# Patient Record
Sex: MALE | Race: WHITE | NOT HISPANIC OR LATINO | Employment: OTHER | URBAN - METROPOLITAN AREA
[De-identification: names, ages, dates, MRNs, and addresses within clinical notes are randomized per-mention and may not be internally consistent; named-entity substitution may affect disease eponyms.]

---

## 2019-09-06 ENCOUNTER — HOSPITAL ENCOUNTER (EMERGENCY)
Facility: HOSPITAL | Age: 54
Discharge: HOME | End: 2019-09-06
Attending: EMERGENCY MEDICINE | Admitting: EMERGENCY MEDICINE

## 2019-09-06 VITALS
OXYGEN SATURATION: 95 % | HEIGHT: 66 IN | DIASTOLIC BLOOD PRESSURE: 64 MMHG | HEART RATE: 78 BPM | SYSTOLIC BLOOD PRESSURE: 136 MMHG | RESPIRATION RATE: 18 BRPM | TEMPERATURE: 97.5 F | WEIGHT: 239.7 LBS | BODY MASS INDEX: 38.52 KG/M2

## 2019-09-06 DIAGNOSIS — E16.0 HYPOGLYCEMIA DUE TO INSULIN: Primary | ICD-10-CM

## 2019-09-06 DIAGNOSIS — T38.3X5A HYPOGLYCEMIA DUE TO INSULIN: Primary | ICD-10-CM

## 2019-09-06 DIAGNOSIS — R56.9 SEIZURE (HCC): ICD-10-CM

## 2019-09-06 LAB
ALBUMIN SERPL-MCNC: 3.8 G/DL (ref 3.5–5.2)
ALBUMIN/GLOB SERPL: 1 G/DL
ALP SERPL-CCNC: 64 U/L (ref 39–117)
ALT SERPL W P-5'-P-CCNC: 12 U/L (ref 1–41)
ANION GAP SERPL CALCULATED.3IONS-SCNC: 9.9 MMOL/L (ref 5–15)
AST SERPL-CCNC: 24 U/L (ref 1–40)
BASOPHILS # BLD AUTO: 0.04 10*3/MM3 (ref 0–0.2)
BASOPHILS NFR BLD AUTO: 0.6 % (ref 0–1.5)
BILIRUB SERPL-MCNC: 0.5 MG/DL (ref 0.2–1.2)
BUN BLD-MCNC: 19 MG/DL (ref 6–20)
BUN/CREAT SERPL: 18.4 (ref 7–25)
CALCIUM SPEC-SCNC: 9.1 MG/DL (ref 8.6–10.5)
CHLORIDE SERPL-SCNC: 100 MMOL/L (ref 98–107)
CO2 SERPL-SCNC: 28.1 MMOL/L (ref 22–29)
CREAT BLD-MCNC: 1.03 MG/DL (ref 0.76–1.27)
DEPRECATED RDW RBC AUTO: 46.8 FL (ref 37–54)
EOSINOPHIL # BLD AUTO: 0.25 10*3/MM3 (ref 0–0.4)
EOSINOPHIL NFR BLD AUTO: 3.5 % (ref 0.3–6.2)
ERYTHROCYTE [DISTWIDTH] IN BLOOD BY AUTOMATED COUNT: 14.5 % (ref 12.3–15.4)
GFR SERPL CREATININE-BSD FRML MDRD: 76 ML/MIN/1.73
GLOBULIN UR ELPH-MCNC: 3.7 GM/DL
GLUCOSE BLD-MCNC: 125 MG/DL (ref 65–99)
GLUCOSE BLDC GLUCOMTR-MCNC: 174 MG/DL (ref 70–130)
GLUCOSE BLDC GLUCOMTR-MCNC: 287 MG/DL (ref 70–130)
GLUCOSE BLDC GLUCOMTR-MCNC: 330 MG/DL (ref 70–130)
GLUCOSE BLDC GLUCOMTR-MCNC: 42 MG/DL (ref 70–130)
HCT VFR BLD AUTO: 41.3 % (ref 37.5–51)
HGB BLD-MCNC: 13.1 G/DL (ref 13–17.7)
IMM GRANULOCYTES # BLD AUTO: 0.02 10*3/MM3 (ref 0–0.05)
IMM GRANULOCYTES NFR BLD AUTO: 0.3 % (ref 0–0.5)
LYMPHOCYTES # BLD AUTO: 1.74 10*3/MM3 (ref 0.7–3.1)
LYMPHOCYTES NFR BLD AUTO: 24.2 % (ref 19.6–45.3)
MCH RBC QN AUTO: 28.2 PG (ref 26.6–33)
MCHC RBC AUTO-ENTMCNC: 31.7 G/DL (ref 31.5–35.7)
MCV RBC AUTO: 89 FL (ref 79–97)
MONOCYTES # BLD AUTO: 0.64 10*3/MM3 (ref 0.1–0.9)
MONOCYTES NFR BLD AUTO: 8.9 % (ref 5–12)
NEUTROPHILS # BLD AUTO: 4.5 10*3/MM3 (ref 1.7–7)
NEUTROPHILS NFR BLD AUTO: 62.5 % (ref 42.7–76)
NRBC BLD AUTO-RTO: 0 /100 WBC (ref 0–0.2)
PLATELET # BLD AUTO: 231 10*3/MM3 (ref 140–450)
PMV BLD AUTO: 10.7 FL (ref 6–12)
POTASSIUM BLD-SCNC: 3.8 MMOL/L (ref 3.5–5.2)
PROT SERPL-MCNC: 7.5 G/DL (ref 6–8.5)
RBC # BLD AUTO: 4.64 10*6/MM3 (ref 4.14–5.8)
SODIUM BLD-SCNC: 138 MMOL/L (ref 136–145)
WBC NRBC COR # BLD: 7.19 10*3/MM3 (ref 3.4–10.8)

## 2019-09-06 PROCEDURE — 99284 EMERGENCY DEPT VISIT MOD MDM: CPT

## 2019-09-06 PROCEDURE — 85025 COMPLETE CBC W/AUTO DIFF WBC: CPT | Performed by: EMERGENCY MEDICINE

## 2019-09-06 PROCEDURE — 82962 GLUCOSE BLOOD TEST: CPT

## 2019-09-06 PROCEDURE — 96374 THER/PROPH/DIAG INJ IV PUSH: CPT

## 2019-09-06 PROCEDURE — 80053 COMPREHEN METABOLIC PANEL: CPT | Performed by: EMERGENCY MEDICINE

## 2019-09-06 RX ORDER — DEXTROSE MONOHYDRATE 25 G/50ML
25 INJECTION, SOLUTION INTRAVENOUS ONCE
Status: COMPLETED | OUTPATIENT
Start: 2019-09-06 | End: 2019-09-06

## 2019-09-06 RX ORDER — SODIUM CHLORIDE 0.9 % (FLUSH) 0.9 %
10 SYRINGE (ML) INJECTION AS NEEDED
Status: DISCONTINUED | OUTPATIENT
Start: 2019-09-06 | End: 2019-09-06 | Stop reason: HOSPADM

## 2019-09-06 RX ADMIN — DEXTROSE MONOHYDRATE 25 G: 70 INJECTION, SOLUTION INTRAVENOUS at 02:41

## 2019-09-06 NOTE — ED NOTES
Pt to ER per EMS with report of seizure and low blood sugar. Pt was given 100ml of D10. Pt has hx of DM and has seizures when his blood sugar gets low. Pt is alert and oriented at this time.     Emily Torres RN  09/06/19 7383

## 2019-09-06 NOTE — ED PROVIDER NOTES
" EMERGENCY DEPARTMENT ENCOUNTER    CHIEF COMPLAINT  Chief Complaint: seizures  History given by: patient and EMS  History limited by: none  Room Number: 12/12  PMD: Provider, No Known      HPI:  Pt is a 53 y.o. male who presents complaining of hypoglycemia and seizures. Per EMS, pt was reported seizing for about 30 minutes and pt has a Hx of DM. Pt denies nausea, HA, blurred vision, vomiting, CP, and SOA. Pt states that he has Hx of similar episodes. Pt states he has been \"pretty busy today\" and \"probably didn't eat as well as usual today\". EMS gave 100mL of D10 en route to hospital and \"immediately was alert and oriented after\".     Duration:  30 minutes  Onset: gradual  Timing: constant  Location: general  Radiation: none  Quality: seizures  Intensity/Severity: mild  Progression: resolved  Associated Symptoms: none  Aggravating Factors: none  Alleviating Factors: none  Previous Episodes: Pt states he has had similar episodes in the past.  Treatment before arrival: EMS gave 100mL of D10 en route to hospital.    PAST MEDICAL HISTORY  Active Ambulatory Problems     Diagnosis Date Noted   • No Active Ambulatory Problems     Resolved Ambulatory Problems     Diagnosis Date Noted   • No Resolved Ambulatory Problems     Past Medical History:   Diagnosis Date   • Diabetes mellitus (CMS/HCC)    • Seizures (CMS/HCC)        PAST SURGICAL HISTORY  No past surgical history on file.    FAMILY HISTORY  No family history on file.    SOCIAL HISTORY  Social History     Socioeconomic History   • Marital status: Single     Spouse name: Not on file   • Number of children: Not on file   • Years of education: Not on file   • Highest education level: Not on file   Tobacco Use   • Smoking status: Never Smoker   Substance and Sexual Activity   • Alcohol use: No     Frequency: Never   • Drug use: No       ALLERGIES  Amoxicillin and Penicillins    REVIEW OF SYSTEMS  Review of Systems   Constitutional: Negative for activity change, appetite " change and fever.   HENT: Negative for congestion and sore throat.    Eyes: Negative.    Respiratory: Negative for cough and shortness of breath.    Cardiovascular: Negative for chest pain and leg swelling.   Gastrointestinal: Negative for abdominal pain, diarrhea and vomiting.   Endocrine: Negative.    Genitourinary: Negative for decreased urine volume and dysuria.   Musculoskeletal: Negative for neck pain.   Skin: Negative for rash and wound.   Allergic/Immunologic: Negative.    Neurological: Positive for seizures. Negative for weakness, numbness and headaches.   Hematological: Negative.    Psychiatric/Behavioral: Negative.    All other systems reviewed and are negative.      PHYSICAL EXAM  ED Triage Vitals [09/06/19 0232]   Temp Heart Rate Resp BP SpO2   -- 65 18 144/66 96 %      Temp src Heart Rate Source Patient Position BP Location FiO2 (%)   -- -- -- -- --       Physical Exam   Constitutional: He is oriented to person, place, and time. No distress.   HENT:   Head: Normocephalic and atraumatic.   Eyes: EOM are normal. Pupils are equal, round, and reactive to light.   Neck: Normal range of motion. Neck supple.   Cardiovascular: Normal rate, regular rhythm and normal heart sounds.   Pulmonary/Chest: Effort normal and breath sounds normal. No respiratory distress.   Abdominal: Soft. There is no tenderness. There is no rebound and no guarding.   Musculoskeletal: Normal range of motion. He exhibits no edema.   Neurological: He is alert and oriented to person, place, and time. He has normal sensation and normal strength.   Skin: Skin is warm and dry.   Psychiatric: Mood and affect normal.   Nursing note and vitals reviewed.      LAB RESULTS  Lab Results (last 24 hours)     Procedure Component Value Units Date/Time    POC Glucose Once [[id number]]  (Abnormal) Collected:  09/06/19 0239    Specimen:  Blood Updated:  09/06/19 0240     Glucose 42 mg/dL     CBC & Differential [[id number]] Collected:  09/06/19 0256     Specimen:  Blood Updated:  09/06/19 0305    Narrative:       The following orders were created for panel order CBC & Differential.  Procedure                               Abnormality         Status                     ---------                               -----------         ------                     CBC Auto Differential[[id number]]        Normal              Final result                 Please view results for these tests on the individual orders.    Comprehensive Metabolic Panel [[id number]]  (Abnormal) Collected:  09/06/19 0256    Specimen:  Blood Updated:  09/06/19 0323     Glucose 125 mg/dL      BUN 19 mg/dL      Creatinine 1.03 mg/dL      Sodium 138 mmol/L      Potassium 3.8 mmol/L      Chloride 100 mmol/L      CO2 28.1 mmol/L      Calcium 9.1 mg/dL      Total Protein 7.5 g/dL      Albumin 3.80 g/dL      ALT (SGPT) 12 U/L      AST (SGOT) 24 U/L      Alkaline Phosphatase 64 U/L      Total Bilirubin 0.5 mg/dL      eGFR Non African Amer 76 mL/min/1.73      Globulin 3.7 gm/dL      A/G Ratio 1.0 g/dL      BUN/Creatinine Ratio 18.4     Anion Gap 9.9 mmol/L     Narrative:       GFR Normal >60  Chronic Kidney Disease <60  Kidney Failure <15    CBC Auto Differential [[id number]]  (Normal) Collected:  09/06/19 0256    Specimen:  Blood Updated:  09/06/19 0305     WBC 7.19 10*3/mm3      RBC 4.64 10*6/mm3      Hemoglobin 13.1 g/dL      Hematocrit 41.3 %      MCV 89.0 fL      MCH 28.2 pg      MCHC 31.7 g/dL      RDW 14.5 %      RDW-SD 46.8 fl      MPV 10.7 fL      Platelets 231 10*3/mm3      Neutrophil % 62.5 %      Lymphocyte % 24.2 %      Monocyte % 8.9 %      Eosinophil % 3.5 %      Basophil % 0.6 %      Immature Grans % 0.3 %      Neutrophils, Absolute 4.50 10*3/mm3      Lymphocytes, Absolute 1.74 10*3/mm3      Monocytes, Absolute 0.64 10*3/mm3      Eosinophils, Absolute 0.25 10*3/mm3      Basophils, Absolute 0.04 10*3/mm3      Immature Grans, Absolute 0.02 10*3/mm3      nRBC 0.0 /100 WBC     POC Glucose Once  [[id number]]  (Abnormal) Collected:  09/06/19 0402    Specimen:  Blood Updated:  09/06/19 0403     Glucose 174 mg/dL     POC Glucose Once [[id number]]  (Abnormal) Collected:  09/06/19 0503    Specimen:  Blood Updated:  09/06/19 0504     Glucose 287 mg/dL     POC Glucose Once [[id number]]  (Abnormal) Collected:  09/06/19 0553    Specimen:  Blood Updated:  09/06/19 0555     Glucose 330 mg/dL           I ordered the above labs and reviewed the results    RADIOLOGY  No orders to display        I ordered the above noted radiological studies. Interpreted by radiologist. Discussed with radiologist. Reviewed by me in PACS.       PROCEDURES  Procedures      PROGRESS AND CONSULTS   0501: re-checked patient, he has eaten a meal tray and has no complaints.  BS elevating without any further drops.  Will continue to monitor blood sugar    0600:  pts BS remains stable without any further hypoglycemia/seizure activity.  Pt stable for discharge and will resume normal insulin regimen this am.  All questions answered.  Stressed the importance of small, frequent meals with insulin.  Pt stable for discharge        MEDICAL DECISION MAKING  Results were reviewed/discussed with the patient and they were also made aware of online access. Pt also made aware that some labs, such as cultures, will not be resulted during ER visit and follow up with PMD is necessary.     MDM  Number of Diagnoses or Management Options     Amount and/or Complexity of Data Reviewed  Obtain history from someone other than the patient: yes (EMS)           DIAGNOSIS  Final diagnoses:   Hypoglycemia due to insulin   Seizure (CMS/[facility])       DISPOSITION  DISCHARGE    Patient discharged in stable condition.    Reviewed implications of results, diagnosis, meds, responsibility to follow up, warning signs and symptoms of possible worsening, potential complications and reasons to return to ER.    Patient/Family voiced understanding of above instructions.    Discussed plan for  discharge, as there is no emergent indication for admission. Patient referred to primary care provider for BP management due to today's BP. Pt/family is agreeable and understands need for follow up and repeat testing.  Pt is aware that discharge does not mean that nothing is wrong but it indicates no emergency is present that requires admission and they must continue care with follow-up as given below or physician of their choice.     FOLLOW-UP  [facility] REFERRAL SERVICE  [facility] [address] [phone]  Schedule an appointment as soon as possible for a visit            Medication List      No changes were made to your prescriptions during this visit.           Latest Documented Vital Signs:  As of 6:07 AM  BP- 136/64 HR- 70 Temp- 97.4 °F (36.3 °C) (Tympanic) O2 sat- 94%    --  Documentation assistance provided by aida Mendes for Dr. Croft.  Information recorded by the scribe was done at my direction and has been verified and validated by me.     Isabel Mendes  09/06/19 0257       Yunier Croft MD  09/06/19 0608

## 2019-09-06 NOTE — ED NOTES
"EMS reports they received a call for seizure. Upon arrival, blood glucose monitor said \"low\" which is < 20 per EMS. Pt was having generalized tonic clonic seizure for at least 20 minutes witnessed by EMS. d10 started in IO access and pt became alert and oriented soon after. Total of 150 mL of d10 given. ]    Upon arrival to ED, pt blood glucose 42. b omar GLASS notified. This RN administered amp of d50 through IO r/t not having peripheral access.    Pt alert and oriented x4. Denies any pain except for IO. Denies n/v.     Pt moved to [place] yesterday from [place].     Saskia Salgado, RN  09/06/19 0251    "

## 2019-10-07 ENCOUNTER — OFFICE VISIT (OUTPATIENT)
Dept: FAMILY MEDICINE CLINIC | Facility: CLINIC | Age: 54
End: 2019-10-07

## 2019-10-07 VITALS
TEMPERATURE: 97.8 F | OXYGEN SATURATION: 94 % | RESPIRATION RATE: 18 BRPM | HEIGHT: 67 IN | BODY MASS INDEX: 38.11 KG/M2 | SYSTOLIC BLOOD PRESSURE: 120 MMHG | DIASTOLIC BLOOD PRESSURE: 70 MMHG | HEART RATE: 66 BPM | WEIGHT: 242.8 LBS

## 2019-10-07 DIAGNOSIS — E03.9 HYPOTHYROIDISM, UNSPECIFIED TYPE: ICD-10-CM

## 2019-10-07 DIAGNOSIS — G62.9 NEUROPATHY: ICD-10-CM

## 2019-10-07 DIAGNOSIS — E11.9 TYPE 2 DIABETES MELLITUS WITHOUT COMPLICATION, UNSPECIFIED WHETHER LONG TERM INSULIN USE (HCC): Primary | ICD-10-CM

## 2019-10-07 DIAGNOSIS — H40.9 GLAUCOMA, UNSPECIFIED GLAUCOMA TYPE, UNSPECIFIED LATERALITY: ICD-10-CM

## 2019-10-07 DIAGNOSIS — Z00.00 WELLNESS EXAMINATION: ICD-10-CM

## 2019-10-07 PROBLEM — Z79.4 TYPE 2 DIABETES MELLITUS WITHOUT COMPLICATION, WITH LONG-TERM CURRENT USE OF INSULIN (HCC): Status: ACTIVE | Noted: 2019-10-07

## 2019-10-07 PROCEDURE — 99214 OFFICE O/P EST MOD 30 MIN: CPT | Performed by: FAMILY MEDICINE

## 2019-10-07 PROCEDURE — 90471 IMMUNIZATION ADMIN: CPT | Performed by: FAMILY MEDICINE

## 2019-10-07 PROCEDURE — 90686 IIV4 VACC NO PRSV 0.5 ML IM: CPT | Performed by: FAMILY MEDICINE

## 2019-10-07 RX ORDER — LEVOTHYROXINE SODIUM 0.15 MG/1
150 TABLET ORAL EVERY OTHER DAY
Qty: 90 TABLET | Refills: 3 | Status: SHIPPED | OUTPATIENT
Start: 2019-10-07 | End: 2019-10-09 | Stop reason: ALTCHOICE

## 2019-10-07 RX ORDER — LATANOPROST 50 UG/ML
1 SOLUTION/ DROPS OPHTHALMIC NIGHTLY
COMMUNITY

## 2019-10-07 RX ORDER — SIMVASTATIN 10 MG
10 TABLET ORAL NIGHTLY
COMMUNITY
End: 2019-10-09 | Stop reason: SDUPTHER

## 2019-10-07 RX ORDER — ENALAPRIL MALEATE 2.5 MG/1
2.5 TABLET ORAL DAILY
Qty: 90 TABLET | Refills: 3 | Status: SHIPPED | OUTPATIENT
Start: 2019-10-07 | End: 2020-07-03 | Stop reason: HOSPADM

## 2019-10-07 RX ORDER — LEVOTHYROXINE SODIUM 175 UG/1
175 TABLET ORAL EVERY OTHER DAY
Qty: 90 TABLET | Refills: 3 | Status: SHIPPED | OUTPATIENT
Start: 2019-10-07 | End: 2019-10-09 | Stop reason: SDUPTHER

## 2019-10-07 RX ORDER — LANCETS 28 GAUGE
1 EACH MISCELLANEOUS AS NEEDED
COMMUNITY
End: 2019-12-27 | Stop reason: SDUPTHER

## 2019-10-07 RX ORDER — ENALAPRIL MALEATE 2.5 MG/1
2.5 TABLET ORAL DAILY
COMMUNITY
End: 2019-10-07 | Stop reason: SDUPTHER

## 2019-10-07 RX ORDER — GABAPENTIN 600 MG/1
600 TABLET ORAL 3 TIMES DAILY
Qty: 90 TABLET | Refills: 0 | Status: SHIPPED | OUTPATIENT
Start: 2019-10-07 | End: 2019-11-25 | Stop reason: SDUPTHER

## 2019-10-07 RX ORDER — LEVOTHYROXINE SODIUM 0.15 MG/1
150 TABLET ORAL EVERY OTHER DAY
COMMUNITY
End: 2019-10-07 | Stop reason: SDUPTHER

## 2019-10-07 RX ORDER — LEVOTHYROXINE SODIUM 175 UG/1
175 TABLET ORAL EVERY OTHER DAY
COMMUNITY
End: 2019-10-07 | Stop reason: SDUPTHER

## 2019-10-07 RX ORDER — GABAPENTIN 600 MG/1
600 TABLET ORAL 3 TIMES DAILY
COMMUNITY
End: 2019-10-07 | Stop reason: SDUPTHER

## 2019-10-07 RX ORDER — BRIMONIDINE TARTRATE 0.15 %
1 DROPS OPHTHALMIC (EYE) 2 TIMES DAILY
COMMUNITY

## 2019-10-07 NOTE — PROGRESS NOTES
Subjective   Anthony Yang is a 54 y.o. male.     Chief Complaint   Patient presents with   • Diabetes     diabetes follow up, pt is not fasting   • Med Refill     needs diabetes medication refills       BS at home, + feet care, + exercise and diet      Diabetes   He presents for his initial diabetic visit. He has type 2 diabetes mellitus. His disease course has been stable. There are no hypoglycemic associated symptoms. There are no hypoglycemic complications. Symptoms are stable. There are no diabetic complications. Risk factors for coronary artery disease include male sex and obesity. Current diabetic treatment includes diet. He is compliant with treatment all of the time.          The following portions of the patient's history were reviewed and updated as appropriate: allergies, current medications, past family history, past medical history, past social history, past surgical history and problem list.    Past Medical History:   Diagnosis Date   • Cataract    • Diabetes mellitus (CMS/HCC)    • Glaucoma    • Neuropathy    • Seizures (CMS/HCC)        Past Surgical History:   Procedure Laterality Date   • EYE SURGERY     • JOINT REPLACEMENT         History reviewed. No pertinent family history.    Social History     Socioeconomic History   • Marital status: Single     Spouse name: Not on file   • Number of children: Not on file   • Years of education: Not on file   • Highest education level: Not on file   Tobacco Use   • Smoking status: Never Smoker   • Smokeless tobacco: Never Used   Substance and Sexual Activity   • Alcohol use: No     Frequency: Never   • Drug use: No       Review of Systems   Constitutional: Negative.    HENT: Negative.    Eyes: Negative.    Respiratory: Negative.    Cardiovascular: Negative.    Gastrointestinal: Negative.    Endocrine: Negative.    Genitourinary: Negative.    Musculoskeletal: Negative.    Skin: Negative.    Allergic/Immunologic: Negative.    Neurological:        Positive  neuropathy   Hematological: Negative.    Psychiatric/Behavioral: Negative.    All other systems reviewed and are negative.      Objective   Vitals:    10/07/19 1302   BP: 120/70   Pulse: 66   Resp: 18   Temp: 97.8 °F (36.6 °C)   SpO2: 94%     Body mass index is 38.56 kg/m².  Physical Exam   Constitutional: He appears well-developed and well-nourished.   HENT:   Head: Normocephalic and atraumatic.   Right Ear: External ear normal.   Left Ear: External ear normal.   Mouth/Throat: Oropharynx is clear and moist.   Eyes: Pupils are equal, round, and reactive to light.   Neck: Normal range of motion. No thyromegaly present.   Cardiovascular: Normal rate, regular rhythm and normal heart sounds. Exam reveals no gallop and no friction rub.   No murmur heard.  Pulmonary/Chest: Effort normal and breath sounds normal. No stridor. No respiratory distress. He has no wheezes. He has no rales.   Abdominal: Soft. Bowel sounds are normal. He exhibits no distension and no mass. There is no tenderness. There is no rebound and no guarding.   Musculoskeletal: Normal range of motion. He exhibits no tenderness.   Neurological: He is alert. He displays normal reflexes. No cranial nerve deficit.   Skin: Skin is warm.   Psychiatric: He has a normal mood and affect. His behavior is normal. Thought content normal.   Nursing note and vitals reviewed.        Assessment/Plan   Anthony was seen today for diabetes and med refill.    Diagnoses and all orders for this visit:    Type 2 diabetes mellitus without complication, unspecified whether long term insulin use (CMS/[facility])  -     CBC & Differential; Future  -     Comprehensive Metabolic Panel; Future  -     Hemoglobin A1c; Future  -     Lipid Panel; Future  -     MicroAlbumin, Urine, Random - Urine, Clean Catch; Future  -     POC Urinalysis Dipstick, Automated; Future  -     TSH; Future  -     Ambulatory Referral to Ophthalmology; Future  -     Fluarix/Fluzone/Afluria Quad/FluLaval Quad    Wellness  examination  -     PSA Screen; Future    Hypothyroidism, unspecified type  -     TSH; Future  -     levothyroxine (SYNTHROID, LEVOTHROID) 150 MCG tablet; Take 1 tablet by mouth Every Other Day.  -     levothyroxine (SYNTHROID, LEVOTHROID) 175 MCG tablet; Take 1 tablet by mouth Every Other Day. Alternate with 150 mcg, every other day  -     T3, Free; Future  -     T4; Future  -     T4, free; Future    Glaucoma, unspecified glaucoma type, unspecified laterality  -     Ambulatory Referral to Ophthalmology; Future    Neuropathy  -     Compliance Drug Analysis, Ur - Urine, Clean Catch; Future  -     Vitamin B12; Future    Low maternal serum vitamin B12  -     Vitamin B12; Future    Other orders  -     enalapril (VASOTEC) 2.5 MG tablet; Take 1 tablet by mouth Daily.  -     metFORMIN (GLUCOPHAGE) 500 MG tablet; Take 1 tablet by mouth 2 (Two) Times a Day With Meals.

## 2019-10-08 ENCOUNTER — RESULTS ENCOUNTER (OUTPATIENT)
Dept: FAMILY MEDICINE CLINIC | Facility: CLINIC | Age: 54
End: 2019-10-08

## 2019-10-08 DIAGNOSIS — O28.0 LOW MATERNAL SERUM VITAMIN B12: ICD-10-CM

## 2019-10-08 DIAGNOSIS — E11.9 TYPE 2 DIABETES MELLITUS WITHOUT COMPLICATION, UNSPECIFIED WHETHER LONG TERM INSULIN USE (HCC): ICD-10-CM

## 2019-10-08 DIAGNOSIS — Z00.00 WELLNESS EXAMINATION: ICD-10-CM

## 2019-10-08 DIAGNOSIS — E03.9 HYPOTHYROIDISM, UNSPECIFIED TYPE: ICD-10-CM

## 2019-10-08 DIAGNOSIS — G62.9 NEUROPATHY: ICD-10-CM

## 2019-10-09 DIAGNOSIS — E03.9 HYPOTHYROIDISM, UNSPECIFIED TYPE: ICD-10-CM

## 2019-10-09 LAB
ALBUMIN SERPL-MCNC: 3.8 G/DL (ref 3.5–5.2)
ALBUMIN/GLOB SERPL: 1.1 G/DL
ALP SERPL-CCNC: 62 U/L (ref 39–117)
ALT SERPL-CCNC: 14 U/L (ref 1–41)
AST SERPL-CCNC: 22 U/L (ref 1–40)
BASOPHILS # BLD AUTO: 0.06 10*3/MM3 (ref 0–0.2)
BASOPHILS NFR BLD AUTO: 0.6 % (ref 0–1.5)
BILIRUB SERPL-MCNC: 0.6 MG/DL (ref 0.2–1.2)
BUN SERPL-MCNC: 12 MG/DL (ref 6–20)
BUN/CREAT SERPL: 10.9 (ref 7–25)
CALCIUM SERPL-MCNC: 8.9 MG/DL (ref 8.6–10.5)
CHLORIDE SERPL-SCNC: 100 MMOL/L (ref 98–107)
CHOLEST SERPL-MCNC: 193 MG/DL (ref 0–200)
CO2 SERPL-SCNC: 28.6 MMOL/L (ref 22–29)
CREAT SERPL-MCNC: 1.1 MG/DL (ref 0.76–1.27)
EOSINOPHIL # BLD AUTO: 0.22 10*3/MM3 (ref 0–0.4)
EOSINOPHIL NFR BLD AUTO: 2.3 % (ref 0.3–6.2)
ERYTHROCYTE [DISTWIDTH] IN BLOOD BY AUTOMATED COUNT: 14.8 % (ref 12.3–15.4)
GLOBULIN SER CALC-MCNC: 3.4 GM/DL
GLUCOSE SERPL-MCNC: 111 MG/DL (ref 65–99)
HBA1C MFR BLD: 8.5 % (ref 4.8–5.6)
HCT VFR BLD AUTO: 44.1 % (ref 37.5–51)
HDLC SERPL-MCNC: 58 MG/DL (ref 40–60)
HGB BLD-MCNC: 13.8 G/DL (ref 13–17.7)
IMM GRANULOCYTES # BLD AUTO: 0.02 10*3/MM3 (ref 0–0.05)
IMM GRANULOCYTES NFR BLD AUTO: 0.2 % (ref 0–0.5)
LDLC SERPL CALC-MCNC: 122 MG/DL (ref 0–100)
LYMPHOCYTES # BLD AUTO: 1.33 10*3/MM3 (ref 0.7–3.1)
LYMPHOCYTES NFR BLD AUTO: 14.1 % (ref 19.6–45.3)
MCH RBC QN AUTO: 28.5 PG (ref 26.6–33)
MCHC RBC AUTO-ENTMCNC: 31.3 G/DL (ref 31.5–35.7)
MCV RBC AUTO: 91.1 FL (ref 79–97)
MICROALBUMIN UR-MCNC: 3.5 UG/ML
MONOCYTES # BLD AUTO: 0.57 10*3/MM3 (ref 0.1–0.9)
MONOCYTES NFR BLD AUTO: 6.1 % (ref 5–12)
NEUTROPHILS # BLD AUTO: 7.21 10*3/MM3 (ref 1.7–7)
NEUTROPHILS NFR BLD AUTO: 76.7 % (ref 42.7–76)
NRBC BLD AUTO-RTO: 0.1 /100 WBC (ref 0–0.2)
PLATELET # BLD AUTO: 251 10*3/MM3 (ref 140–450)
POTASSIUM SERPL-SCNC: 4 MMOL/L (ref 3.5–5.2)
PROT SERPL-MCNC: 7.2 G/DL (ref 6–8.5)
PSA SERPL-MCNC: 0.24 NG/ML (ref 0–4)
RBC # BLD AUTO: 4.84 10*6/MM3 (ref 4.14–5.8)
SODIUM SERPL-SCNC: 140 MMOL/L (ref 136–145)
T3FREE SERPL-MCNC: 0.8 PG/ML (ref 2–4.4)
T4 FREE SERPL-MCNC: 0.22 NG/DL (ref 0.93–1.7)
T4 SERPL-MCNC: 1.59 MCG/DL (ref 4.5–11.7)
TRIGL SERPL-MCNC: 67 MG/DL (ref 0–150)
TSH SERPL DL<=0.005 MIU/L-ACNC: ABNORMAL UIU/ML (ref 0.27–4.2)
VIT B12 SERPL-MCNC: 315 PG/ML (ref 211–946)
VLDLC SERPL CALC-MCNC: 13.4 MG/DL
WBC # BLD AUTO: 9.41 10*3/MM3 (ref 3.4–10.8)

## 2019-10-09 RX ORDER — LEVOTHYROXINE SODIUM 175 UG/1
TABLET ORAL
Qty: 90 TABLET | Refills: 3 | Status: SHIPPED | OUTPATIENT
Start: 2019-10-09 | End: 2020-01-09 | Stop reason: SDUPTHER

## 2019-10-09 RX ORDER — SIMVASTATIN 20 MG
20 TABLET ORAL NIGHTLY
Qty: 90 TABLET | Refills: 3 | Status: SHIPPED | OUTPATIENT
Start: 2019-10-09 | End: 2020-09-14 | Stop reason: SDUPTHER

## 2019-10-09 NOTE — PROGRESS NOTES
His sugar is elevated please let him know, I will increase his 70/30 insulin to 35 units twice daily, TSH is also elevated with low t 3 low T4 continue taking levothyroxine 175 mcg daily not alternating and stop the 150 mcg, cholesterol also elevated I increase the simvastatin to 20 mg recheck thyroid in 6 weeks

## 2019-11-12 ENCOUNTER — LAB (OUTPATIENT)
Dept: FAMILY MEDICINE CLINIC | Facility: CLINIC | Age: 54
End: 2019-11-12

## 2019-11-12 DIAGNOSIS — E11.9 TYPE 2 DIABETES MELLITUS WITHOUT COMPLICATION, UNSPECIFIED WHETHER LONG TERM INSULIN USE (HCC): ICD-10-CM

## 2019-11-13 LAB
BILIRUB BLD-MCNC: NEGATIVE MG/DL
CLARITY, POC: CLEAR
COLOR UR: YELLOW
GLUCOSE UR STRIP-MCNC: NEGATIVE MG/DL
KETONES UR QL: ABNORMAL
LEUKOCYTE EST, POC: NEGATIVE
NITRITE UR-MCNC: NEGATIVE MG/ML
PH UR: 6 [PH] (ref 5–8)
PROT UR STRIP-MCNC: NEGATIVE MG/DL
RBC # UR STRIP: NEGATIVE /UL
SP GR UR: 1.02 (ref 1–1.03)
UROBILINOGEN UR QL: NORMAL

## 2019-11-16 LAB
DRUGS UR: NORMAL
DRUGS UR: NORMAL

## 2019-11-25 DIAGNOSIS — G62.9 NEUROPATHY: ICD-10-CM

## 2019-11-25 RX ORDER — GABAPENTIN 600 MG/1
600 TABLET ORAL 3 TIMES DAILY
Qty: 90 TABLET | Refills: 0 | Status: SHIPPED | OUTPATIENT
Start: 2019-11-25 | End: 2019-12-27 | Stop reason: SDUPTHER

## 2019-12-26 ENCOUNTER — TELEPHONE (OUTPATIENT)
Dept: FAMILY MEDICINE CLINIC | Facility: CLINIC | Age: 54
End: 2019-12-26

## 2019-12-26 DIAGNOSIS — Z79.4 TYPE 2 DIABETES MELLITUS WITHOUT COMPLICATION, WITH LONG-TERM CURRENT USE OF INSULIN (HCC): Primary | ICD-10-CM

## 2019-12-26 DIAGNOSIS — E11.9 TYPE 2 DIABETES MELLITUS WITHOUT COMPLICATION, WITH LONG-TERM CURRENT USE OF INSULIN (HCC): Primary | ICD-10-CM

## 2019-12-26 DIAGNOSIS — G62.9 NEUROPATHY: ICD-10-CM

## 2019-12-26 NOTE — TELEPHONE ENCOUNTER
PT NEEDS REFILL ON HIS Lancets (FREESTYLE) lancets, glucose blood (FREESTYLE LITE) test strip, AND insulin lispro (HUMALOG) 100 UNIT/ML injection SENT TO PHARMACY

## 2019-12-27 DIAGNOSIS — G62.9 NEUROPATHY: ICD-10-CM

## 2019-12-27 RX ORDER — LANCETS 28 GAUGE
EACH MISCELLANEOUS
Qty: 100 EACH | Refills: 2 | Status: SHIPPED | OUTPATIENT
Start: 2019-12-27 | End: 2020-01-08 | Stop reason: SDUPTHER

## 2019-12-27 RX ORDER — GABAPENTIN 600 MG/1
TABLET ORAL
Qty: 90 TABLET | Refills: 0 | Status: SHIPPED | OUTPATIENT
Start: 2019-12-27 | End: 2020-02-07

## 2019-12-27 RX ORDER — GABAPENTIN 600 MG/1
600 TABLET ORAL 3 TIMES DAILY
Qty: 90 TABLET | Refills: 0 | Status: SHIPPED | OUTPATIENT
Start: 2019-12-27 | End: 2020-01-08

## 2020-01-08 ENCOUNTER — OFFICE VISIT (OUTPATIENT)
Dept: FAMILY MEDICINE CLINIC | Facility: CLINIC | Age: 55
End: 2020-01-08

## 2020-01-08 VITALS
SYSTOLIC BLOOD PRESSURE: 116 MMHG | TEMPERATURE: 98.2 F | BODY MASS INDEX: 37.35 KG/M2 | OXYGEN SATURATION: 98 % | DIASTOLIC BLOOD PRESSURE: 72 MMHG | HEIGHT: 67 IN | HEART RATE: 83 BPM | WEIGHT: 238 LBS

## 2020-01-08 DIAGNOSIS — Z79.4 TYPE 2 DIABETES MELLITUS WITHOUT COMPLICATION, WITH LONG-TERM CURRENT USE OF INSULIN (HCC): ICD-10-CM

## 2020-01-08 DIAGNOSIS — I10 HYPERTENSION, UNSPECIFIED TYPE: ICD-10-CM

## 2020-01-08 DIAGNOSIS — E11.9 TYPE 2 DIABETES MELLITUS WITHOUT COMPLICATION, WITH LONG-TERM CURRENT USE OF INSULIN (HCC): ICD-10-CM

## 2020-01-08 DIAGNOSIS — E11.9 TYPE 2 DIABETES MELLITUS WITHOUT COMPLICATION, WITH LONG-TERM CURRENT USE OF INSULIN (HCC): Primary | ICD-10-CM

## 2020-01-08 DIAGNOSIS — E03.9 HYPOTHYROIDISM, UNSPECIFIED TYPE: ICD-10-CM

## 2020-01-08 DIAGNOSIS — Z79.4 TYPE 2 DIABETES MELLITUS WITHOUT COMPLICATION, WITH LONG-TERM CURRENT USE OF INSULIN (HCC): Primary | ICD-10-CM

## 2020-01-08 DIAGNOSIS — E78.5 HYPERLIPIDEMIA, UNSPECIFIED HYPERLIPIDEMIA TYPE: ICD-10-CM

## 2020-01-08 PROCEDURE — 99214 OFFICE O/P EST MOD 30 MIN: CPT | Performed by: FAMILY MEDICINE

## 2020-01-08 RX ORDER — LANCETS 28 GAUGE
EACH MISCELLANEOUS
Qty: 100 EACH | Refills: 2 | Status: SHIPPED | OUTPATIENT
Start: 2020-01-08

## 2020-01-08 RX ORDER — CALCIUM CARB/VITAMIN D3/VIT K1 500-100-40
TABLET,CHEWABLE ORAL
Qty: 100 EACH | Refills: 2 | Status: SHIPPED | OUTPATIENT
Start: 2020-01-08 | End: 2020-01-10 | Stop reason: SDUPTHER

## 2020-01-08 NOTE — PROGRESS NOTES
Subjective   Anthony Yang is a 54 y.o. male.     Chief Complaint   Patient presents with   • Diabetes     3 month f/up        average got flu shot, no chest pain or shortness of breath, he does need some refill his test strips and needles, he is not fasting today, no complaints, positive eye care and foot care         The following portions of the patient's history were reviewed and updated as appropriate: allergies, current medications, past family history, past medical history, past social history, past surgical history and problem list.    Past Medical History:   Diagnosis Date   • Cataract    • Diabetes mellitus (CMS/HCC)    • Glaucoma    • Neuropathy    • Seizures (CMS/HCC)        Past Surgical History:   Procedure Laterality Date   • EYE SURGERY     • JOINT REPLACEMENT         History reviewed. No pertinent family history.    Social History     Socioeconomic History   • Marital status: Single     Spouse name: Not on file   • Number of children: Not on file   • Years of education: Not on file   • Highest education level: Not on file   Tobacco Use   • Smoking status: Never Smoker   • Smokeless tobacco: Never Used   Substance and Sexual Activity   • Alcohol use: No     Frequency: Never   • Drug use: No   • Sexual activity: Defer       Review of Systems   Constitutional: Negative.  Negative for unexpected weight gain.   HENT: Negative.    Eyes: Negative.  Negative for blurred vision.   Respiratory: Negative.    Cardiovascular: Negative.    Gastrointestinal: Negative.  Negative for nausea and vomiting.   Endocrine: Negative.  Negative for polydipsia and polyuria.   Genitourinary: Negative.  Negative for frequency.   Musculoskeletal: Negative.    Skin: Negative.    Neurological: Negative.  Negative for numbness.   Hematological: Negative.    Psychiatric/Behavioral: Negative.    All other systems reviewed and are negative.      Objective   Vitals:    01/08/20 1243   BP: 116/72   Pulse: 83   Temp: 98.2 °F  (36.8 °C)   SpO2: 98%     Body mass index is 37.8 kg/m².  Physical Exam   Constitutional: He is oriented to person, place, and time. He appears well-developed and well-nourished.   HENT:   Head: Normocephalic and atraumatic.   Right Ear: External ear normal.   Left Ear: External ear normal.   Nose: Nose normal.   Mouth/Throat: Oropharynx is clear and moist.   Eyes: Pupils are equal, round, and reactive to light. Conjunctivae and EOM are normal.   Neck: Normal range of motion. Neck supple. No tracheal deviation present. No thyromegaly present.   Cardiovascular: Normal rate, regular rhythm and normal heart sounds. Exam reveals no gallop and no friction rub.   No murmur heard.  Pulmonary/Chest: Effort normal and breath sounds normal. No respiratory distress. He exhibits no tenderness.   Abdominal: Soft. Bowel sounds are normal. He exhibits no distension. There is no tenderness.   Musculoskeletal: Normal range of motion. He exhibits no edema or tenderness.   Lymphadenopathy:     He has no cervical adenopathy.   Neurological: He is alert and oriented to person, place, and time. He displays normal reflexes. No cranial nerve deficit or sensory deficit. Coordination normal.   Skin: Skin is warm and dry.   Psychiatric: He has a normal mood and affect. His behavior is normal. Judgment and thought content normal.   Nursing note and vitals reviewed.        Assessment/Plan   Anthony was seen today for diabetes.    Diagnoses and all orders for this visit:    Type 2 diabetes mellitus without complication, with long-term current use of insulin (CMS/[facility])  -     Comprehensive Metabolic Panel  -     Hemoglobin A1c  -     Cancel: Lipid Panel  -     T3, Free  -     T4  -     T4, free  -     TSH  -     Lipid Panel; Future    Hypothyroidism, unspecified type  -     T3, Free  -     T4  -     T4, free  -     TSH    Hyperlipidemia, unspecified hyperlipidemia type  -     Comprehensive Metabolic Panel  -     Hemoglobin A1c  -     T3, Free  -      T4  -     T4, free  -     TSH  -     Lipid Panel; Future    Hypertension, unspecified type  -     Comprehensive Metabolic Panel  -     Hemoglobin A1c  -     T3, Free  -     T4  -     T4, free  -     TSH  -     Lipid Panel; Future

## 2020-01-09 DIAGNOSIS — E03.9 HYPOTHYROIDISM, UNSPECIFIED TYPE: ICD-10-CM

## 2020-01-09 LAB
ALBUMIN SERPL-MCNC: 3.9 G/DL (ref 3.5–5.2)
ALBUMIN/GLOB SERPL: 1.1 G/DL
ALP SERPL-CCNC: 68 U/L (ref 39–117)
ALT SERPL-CCNC: 9 U/L (ref 1–41)
AST SERPL-CCNC: 16 U/L (ref 1–40)
BILIRUB SERPL-MCNC: 0.9 MG/DL (ref 0.2–1.2)
BUN SERPL-MCNC: 8 MG/DL (ref 6–20)
BUN/CREAT SERPL: 9 (ref 7–25)
CALCIUM SERPL-MCNC: 9.3 MG/DL (ref 8.6–10.5)
CHLORIDE SERPL-SCNC: 97 MMOL/L (ref 98–107)
CO2 SERPL-SCNC: 24.6 MMOL/L (ref 22–29)
CREAT SERPL-MCNC: 0.89 MG/DL (ref 0.76–1.27)
GLOBULIN SER CALC-MCNC: 3.7 GM/DL
GLUCOSE SERPL-MCNC: 216 MG/DL (ref 65–99)
HBA1C MFR BLD: 7.9 % (ref 4.8–5.6)
POTASSIUM SERPL-SCNC: 5.1 MMOL/L (ref 3.5–5.2)
PROT SERPL-MCNC: 7.6 G/DL (ref 6–8.5)
SODIUM SERPL-SCNC: 137 MMOL/L (ref 136–145)
T3FREE SERPL-MCNC: 2.9 PG/ML (ref 2–4.4)
T4 FREE SERPL-MCNC: 2.18 NG/DL (ref 0.93–1.7)
T4 SERPL-MCNC: 11.2 MCG/DL (ref 4.5–11.7)
TSH SERPL DL<=0.005 MIU/L-ACNC: 0.14 UIU/ML (ref 0.27–4.2)

## 2020-01-09 RX ORDER — LEVOTHYROXINE SODIUM 0.15 MG/1
TABLET ORAL
Qty: 90 TABLET | Refills: 3 | Status: SHIPPED | OUTPATIENT
Start: 2020-01-09 | End: 2020-07-03 | Stop reason: HOSPADM

## 2020-01-09 NOTE — PROGRESS NOTES
I called the patient, we will going to switch the levothyroxine to 150 mcg, recheck in 6 weeks continue therapy for sugar is improving

## 2020-01-10 DIAGNOSIS — Z79.4 TYPE 2 DIABETES MELLITUS WITHOUT COMPLICATION, WITH LONG-TERM CURRENT USE OF INSULIN (HCC): ICD-10-CM

## 2020-01-10 DIAGNOSIS — E11.9 TYPE 2 DIABETES MELLITUS WITHOUT COMPLICATION, WITH LONG-TERM CURRENT USE OF INSULIN (HCC): ICD-10-CM

## 2020-01-10 RX ORDER — CALCIUM CARB/VITAMIN D3/VIT K1 500-100-40
TABLET,CHEWABLE ORAL
Qty: 100 EACH | Refills: 2 | Status: SHIPPED | OUTPATIENT
Start: 2020-01-10 | End: 2020-09-14 | Stop reason: SDUPTHER

## 2020-02-06 DIAGNOSIS — G62.9 NEUROPATHY: ICD-10-CM

## 2020-02-07 RX ORDER — GABAPENTIN 600 MG/1
TABLET ORAL
Qty: 90 TABLET | Refills: 0 | Status: SHIPPED | OUTPATIENT
Start: 2020-02-07 | End: 2020-04-16

## 2020-02-08 ENCOUNTER — RESULTS ENCOUNTER (OUTPATIENT)
Dept: FAMILY MEDICINE CLINIC | Facility: CLINIC | Age: 55
End: 2020-02-08

## 2020-02-08 DIAGNOSIS — E11.9 TYPE 2 DIABETES MELLITUS WITHOUT COMPLICATION, WITH LONG-TERM CURRENT USE OF INSULIN (HCC): ICD-10-CM

## 2020-02-08 DIAGNOSIS — Z79.4 TYPE 2 DIABETES MELLITUS WITHOUT COMPLICATION, WITH LONG-TERM CURRENT USE OF INSULIN (HCC): ICD-10-CM

## 2020-02-08 DIAGNOSIS — I10 HYPERTENSION, UNSPECIFIED TYPE: ICD-10-CM

## 2020-02-08 DIAGNOSIS — E78.5 HYPERLIPIDEMIA, UNSPECIFIED HYPERLIPIDEMIA TYPE: ICD-10-CM

## 2020-04-13 DIAGNOSIS — G62.9 NEUROPATHY: ICD-10-CM

## 2020-04-16 RX ORDER — GABAPENTIN 600 MG/1
TABLET ORAL
Qty: 90 TABLET | Refills: 0 | Status: SHIPPED | OUTPATIENT
Start: 2020-04-16 | End: 2020-05-12

## 2020-05-11 DIAGNOSIS — G62.9 NEUROPATHY: ICD-10-CM

## 2020-05-12 RX ORDER — GABAPENTIN 600 MG/1
TABLET ORAL
Qty: 90 TABLET | Refills: 0 | Status: SHIPPED | OUTPATIENT
Start: 2020-05-12 | End: 2020-07-03 | Stop reason: HOSPADM

## 2020-06-12 ENCOUNTER — APPOINTMENT (OUTPATIENT)
Dept: NEUROLOGY | Facility: HOSPITAL | Age: 55
End: 2020-06-12

## 2020-06-12 ENCOUNTER — HOSPITAL ENCOUNTER (INPATIENT)
Facility: HOSPITAL | Age: 55
LOS: 21 days | Discharge: NURSING, REHAB OR LONG-TERM CARE | End: 2020-07-03
Attending: EMERGENCY MEDICINE | Admitting: INTERNAL MEDICINE

## 2020-06-12 ENCOUNTER — APPOINTMENT (OUTPATIENT)
Dept: GENERAL RADIOLOGY | Facility: HOSPITAL | Age: 55
End: 2020-06-12

## 2020-06-12 ENCOUNTER — APPOINTMENT (OUTPATIENT)
Dept: CT IMAGING | Facility: HOSPITAL | Age: 55
End: 2020-06-12

## 2020-06-12 DIAGNOSIS — R79.89 ELEVATED PROCALCITONIN: ICD-10-CM

## 2020-06-12 DIAGNOSIS — G93.40 ENCEPHALOPATHY ACUTE: ICD-10-CM

## 2020-06-12 DIAGNOSIS — R56.9 SEIZURE (HCC): Primary | ICD-10-CM

## 2020-06-12 DIAGNOSIS — D72.829 LEUKOCYTOSIS, UNSPECIFIED TYPE: ICD-10-CM

## 2020-06-12 LAB
ALBUMIN SERPL-MCNC: 3.7 G/DL (ref 3.5–5.2)
ALBUMIN/GLOB SERPL: 1 G/DL
ALP SERPL-CCNC: 57 U/L (ref 39–117)
ALT SERPL W P-5'-P-CCNC: 17 U/L (ref 1–41)
AMPHET+METHAMPHET UR QL: NEGATIVE
ANION GAP SERPL CALCULATED.3IONS-SCNC: 14.4 MMOL/L (ref 5–15)
APPEARANCE CSF: CLEAR
APPEARANCE CSF: CLEAR
ARTERIAL PATENCY WRIST A: POSITIVE
ARTERIAL PATENCY WRIST A: POSITIVE
AST SERPL-CCNC: 26 U/L (ref 1–40)
ATMOSPHERIC PRESS: 754.3 MMHG
ATMOSPHERIC PRESS: 756.9 MMHG
BACTERIA UR QL AUTO: ABNORMAL /HPF
BARBITURATES UR QL SCN: NEGATIVE
BASE EXCESS BLDA CALC-SCNC: -4.7 MMOL/L (ref 0–2)
BASE EXCESS BLDA CALC-SCNC: -6.2 MMOL/L (ref 0–2)
BASOPHILS # BLD AUTO: 0.05 10*3/MM3 (ref 0–0.2)
BASOPHILS NFR BLD AUTO: 0.3 % (ref 0–1.5)
BDY SITE: ABNORMAL
BDY SITE: ABNORMAL
BENZODIAZ UR QL SCN: NEGATIVE
BILIRUB SERPL-MCNC: 1.4 MG/DL (ref 0.2–1.2)
BILIRUB UR QL STRIP: NEGATIVE
BUN BLD-MCNC: 18 MG/DL (ref 6–20)
BUN/CREAT SERPL: 21.4 (ref 7–25)
C GATTII+NEOFOR DNA CSF QL NAA+NON-PROBE: NOT DETECTED
CALCIUM SPEC-SCNC: 8.9 MG/DL (ref 8.6–10.5)
CANNABINOIDS SERPL QL: NEGATIVE
CHLORIDE SERPL-SCNC: 99 MMOL/L (ref 98–107)
CK SERPL-CCNC: 149 U/L (ref 20–200)
CLARITY UR: CLEAR
CMV DNA CSF QL NAA+PROBE: NOT DETECTED
CO2 SERPL-SCNC: 21.6 MMOL/L (ref 22–29)
COCAINE UR QL: NEGATIVE
COLOR CSF: COLORLESS
COLOR CSF: COLORLESS
COLOR UR: YELLOW
CREAT BLD-MCNC: 0.84 MG/DL (ref 0.76–1.27)
D-LACTATE SERPL-SCNC: 1.6 MMOL/L (ref 0.5–2)
DEPRECATED RDW RBC AUTO: 46.5 FL (ref 37–54)
E COLI K1 DNA CSF QL NAA+NON-PROBE: NOT DETECTED
EOSINOPHIL # BLD AUTO: 0.01 10*3/MM3 (ref 0–0.4)
EOSINOPHIL NFR BLD AUTO: 0.1 % (ref 0.3–6.2)
ERYTHROCYTE [DISTWIDTH] IN BLOOD BY AUTOMATED COUNT: 14.4 % (ref 12.3–15.4)
ETHANOL BLD-MCNC: <10 MG/DL (ref 0–10)
ETHANOL UR QL: <0.01 %
EV RNA CSF QL NAA+PROBE: NOT DETECTED
GFR SERPL CREATININE-BSD FRML MDRD: 96 ML/MIN/1.73
GIE STN SPEC: NORMAL
GLOBULIN UR ELPH-MCNC: 3.7 GM/DL
GLUCOSE BLD-MCNC: 335 MG/DL (ref 65–99)
GLUCOSE BLDC GLUCOMTR-MCNC: 356 MG/DL (ref 70–130)
GLUCOSE BLDC GLUCOMTR-MCNC: 372 MG/DL (ref 70–130)
GLUCOSE CSF-MCNC: 199 MG/DL (ref 40–70)
GLUCOSE UR STRIP-MCNC: ABNORMAL MG/DL
GP B STREP DNA SPEC QL NAA+PROBE: NOT DETECTED
HAEM INFLU SEROTYP DNA SPEC NAA+PROBE: NOT DETECTED
HCO3 BLDA-SCNC: 19 MMOL/L (ref 22–28)
HCO3 BLDA-SCNC: 20 MMOL/L (ref 22–28)
HCT VFR BLD AUTO: 40.1 % (ref 37.5–51)
HGB BLD-MCNC: 13.1 G/DL (ref 13–17.7)
HGB UR QL STRIP.AUTO: ABNORMAL
HHV6 DNA CSF QL NAA+PROBE: NOT DETECTED
HOROWITZ INDEX BLD+IHG-RTO: 100 %
HOROWITZ INDEX BLD+IHG-RTO: 21 %
HSV1 DNA CSF QL NAA+PROBE: NOT DETECTED
HSV2 DNA CSF QL NAA+PROBE: NOT DETECTED
HYALINE CASTS UR QL AUTO: ABNORMAL /LPF
IMM GRANULOCYTES # BLD AUTO: 0.1 10*3/MM3 (ref 0–0.05)
IMM GRANULOCYTES NFR BLD AUTO: 0.6 % (ref 0–0.5)
INR PPP: 1.2 (ref 0.9–1.1)
KETONES UR QL STRIP: ABNORMAL
L MONOCYTOG RRNA SPEC QL PROBE: NOT DETECTED
LEUKOCYTE ESTERASE UR QL STRIP.AUTO: NEGATIVE
LYMPHOCYTES # BLD AUTO: 1.52 10*3/MM3 (ref 0.7–3.1)
LYMPHOCYTES NFR BLD AUTO: 8.4 % (ref 19.6–45.3)
LYMPHOCYTES NFR CSF MANUAL: 13 %
LYMPHOCYTES NFR CSF MANUAL: 7 %
MAGNESIUM SERPL-MCNC: 1.9 MG/DL (ref 1.6–2.6)
MCH RBC QN AUTO: 29.2 PG (ref 26.6–33)
MCHC RBC AUTO-ENTMCNC: 32.7 G/DL (ref 31.5–35.7)
MCV RBC AUTO: 89.5 FL (ref 79–97)
METHADONE UR QL SCN: NEGATIVE
METHOD: ABNORMAL
METHOD: ABNORMAL
MODALITY: ABNORMAL
MODALITY: ABNORMAL
MONOCYTES # BLD AUTO: 1.38 10*3/MM3 (ref 0.1–0.9)
MONOCYTES NFR BLD AUTO: 7.7 % (ref 5–12)
MONOCYTES NFR CSF MANUAL: 4 %
MONOCYTES NFR CSF MANUAL: 5 %
N MEN DNA SPEC QL NAA+PROBE: NOT DETECTED
NEUTROPHILS # BLD AUTO: 14.95 10*3/MM3 (ref 1.7–7)
NEUTROPHILS NFR BLD AUTO: 82.9 % (ref 42.7–76)
NEUTROPHILS NFR CSF MICRO: 82 %
NEUTROPHILS NFR CSF MICRO: 89 %
NITRITE UR QL STRIP: NEGATIVE
NRBC BLD AUTO-RTO: 0 /100 WBC (ref 0–0.2)
NUC CELL # CSF MANUAL: 113 /MM3 (ref 0–5)
NUC CELL # CSF MANUAL: 79 /MM3 (ref 0–5)
O2 A-A PPRESDIFF RESPIRATORY: 0.4 MMHG
O2 A-A PPRESDIFF RESPIRATORY: 0.8 MMHG
OPIATES UR QL: NEGATIVE
OXYCODONE UR QL SCN: NEGATIVE
PARECHOVIRUS A RNA CSF QL NAA+NON-PROBE: NOT DETECTED
PCO2 BLDA: 34.8 MM HG (ref 35–45)
PCO2 BLDA: 35.8 MM HG (ref 35–45)
PEEP RESPIRATORY: 5 CM[H2O]
PH BLDA: 7.33 PH UNITS (ref 7.35–7.45)
PH BLDA: 7.37 PH UNITS (ref 7.35–7.45)
PH UR STRIP.AUTO: 6 [PH] (ref 5–8)
PLATELET # BLD AUTO: 269 10*3/MM3 (ref 140–450)
PMV BLD AUTO: 11.9 FL (ref 6–12)
PO2 BLDA: 297.4 MM HG (ref 80–100)
PO2 BLDA: 89.2 MM HG (ref 80–100)
POTASSIUM BLD-SCNC: 4.6 MMOL/L (ref 3.5–5.2)
PROCALCITONIN SERPL-MCNC: 0.51 NG/ML (ref 0.1–0.25)
PROT CSF-MCNC: 25 MG/DL (ref 15–45)
PROT SERPL-MCNC: 7.4 G/DL (ref 6–8.5)
PROT UR QL STRIP: NEGATIVE
PROTHROMBIN TIME: 14.9 SECONDS (ref 11.7–14.2)
RBC # BLD AUTO: 4.48 10*6/MM3 (ref 4.14–5.8)
RBC # CSF MANUAL: 2 /MM3 (ref 0–0)
RBC # CSF MANUAL: 6 /MM3 (ref 0–0)
RBC # UR: ABNORMAL /HPF
REF LAB TEST METHOD: ABNORMAL
S PNEUM DNA CSF QL NAA+NON-PROBE: NOT DETECTED
SAO2 % BLDCOA: 96.7 % (ref 92–99)
SAO2 % BLDCOA: 99.9 % (ref 92–99)
SET MECH RESP RATE: 18
SODIUM BLD-SCNC: 135 MMOL/L (ref 136–145)
SP GR UR STRIP: 1.02 (ref 1–1.03)
SQUAMOUS #/AREA URNS HPF: ABNORMAL /HPF
T4 FREE SERPL-MCNC: 1.59 NG/DL (ref 0.93–1.7)
TOTAL RATE: 18 BREATHS/MINUTE
TOTAL RATE: 24 BREATHS/MINUTE
TUBE # CSF: 1
TUBE # CSF: 4
UROBILINOGEN UR QL STRIP: ABNORMAL
VENTILATOR MODE: ABNORMAL
VZV DNA CSF QL NAA+PROBE: NOT DETECTED
WBC NRBC COR # BLD: 18.01 10*3/MM3 (ref 3.4–10.8)
WBC UR QL AUTO: ABNORMAL /HPF

## 2020-06-12 PROCEDURE — 95816 EEG AWAKE AND DROWSY: CPT

## 2020-06-12 PROCEDURE — 86777 TOXOPLASMA ANTIBODY: CPT | Performed by: INTERNAL MEDICINE

## 2020-06-12 PROCEDURE — 25010000002 LEVETIRACETAM IN NACL 0.82% 500 MG/100ML SOLUTION: Performed by: PSYCHIATRY & NEUROLOGY

## 2020-06-12 PROCEDURE — 87076 CULTURE ANAEROBE IDENT EACH: CPT | Performed by: EMERGENCY MEDICINE

## 2020-06-12 PROCEDURE — 86592 SYPHILIS TEST NON-TREP QUAL: CPT | Performed by: INTERNAL MEDICINE

## 2020-06-12 PROCEDURE — 80307 DRUG TEST PRSMV CHEM ANLYZR: CPT | Performed by: EMERGENCY MEDICINE

## 2020-06-12 PROCEDURE — 82803 BLOOD GASES ANY COMBINATION: CPT

## 2020-06-12 PROCEDURE — 36600 WITHDRAWAL OF ARTERIAL BLOOD: CPT

## 2020-06-12 PROCEDURE — 83735 ASSAY OF MAGNESIUM: CPT | Performed by: EMERGENCY MEDICINE

## 2020-06-12 PROCEDURE — 82945 GLUCOSE OTHER FLUID: CPT | Performed by: INTERNAL MEDICINE

## 2020-06-12 PROCEDURE — 86778 TOXOPLASMA ANTIBODY IGM: CPT | Performed by: INTERNAL MEDICINE

## 2020-06-12 PROCEDURE — 84145 PROCALCITONIN (PCT): CPT | Performed by: EMERGENCY MEDICINE

## 2020-06-12 PROCEDURE — 87206 SMEAR FLUORESCENT/ACID STAI: CPT | Performed by: INTERNAL MEDICINE

## 2020-06-12 PROCEDURE — 80053 COMPREHEN METABOLIC PANEL: CPT | Performed by: EMERGENCY MEDICINE

## 2020-06-12 PROCEDURE — 88184 FLOWCYTOMETRY/ TC 1 MARKER: CPT | Performed by: INTERNAL MEDICINE

## 2020-06-12 PROCEDURE — 87205 SMEAR GRAM STAIN: CPT | Performed by: INTERNAL MEDICINE

## 2020-06-12 PROCEDURE — 84439 ASSAY OF FREE THYROXINE: CPT | Performed by: INTERNAL MEDICINE

## 2020-06-12 PROCEDURE — 94799 UNLISTED PULMONARY SVC/PX: CPT

## 2020-06-12 PROCEDURE — 87116 MYCOBACTERIA CULTURE: CPT | Performed by: INTERNAL MEDICINE

## 2020-06-12 PROCEDURE — 25010000003 LEVETIRACETAM IN NACL 0.75% 1000 MG/100ML SOLUTION: Performed by: EMERGENCY MEDICINE

## 2020-06-12 PROCEDURE — 85610 PROTHROMBIN TIME: CPT | Performed by: EMERGENCY MEDICINE

## 2020-06-12 PROCEDURE — 86789 WEST NILE VIRUS ANTIBODY: CPT | Performed by: INTERNAL MEDICINE

## 2020-06-12 PROCEDURE — 0BH17EZ INSERTION OF ENDOTRACHEAL AIRWAY INTO TRACHEA, VIA NATURAL OR ARTIFICIAL OPENING: ICD-10-PCS | Performed by: INTERNAL MEDICINE

## 2020-06-12 PROCEDURE — 25010000002 ACYCLOVIR PER 5 MG: Performed by: EMERGENCY MEDICINE

## 2020-06-12 PROCEDURE — 83605 ASSAY OF LACTIC ACID: CPT | Performed by: EMERGENCY MEDICINE

## 2020-06-12 PROCEDURE — 25010000003 LIDOCAINE 1 % SOLUTION: Performed by: INTERNAL MEDICINE

## 2020-06-12 PROCEDURE — 94003 VENT MGMT INPAT SUBQ DAY: CPT

## 2020-06-12 PROCEDURE — 71045 X-RAY EXAM CHEST 1 VIEW: CPT

## 2020-06-12 PROCEDURE — 85025 COMPLETE CBC W/AUTO DIFF WBC: CPT | Performed by: EMERGENCY MEDICINE

## 2020-06-12 PROCEDURE — 87483 CNS DNA AMP PROBE TYPE 12-25: CPT | Performed by: INTERNAL MEDICINE

## 2020-06-12 PROCEDURE — 87075 CULTR BACTERIA EXCEPT BLOOD: CPT | Performed by: INTERNAL MEDICINE

## 2020-06-12 PROCEDURE — 25010000002 VANCOMYCIN 10 G RECONSTITUTED SOLUTION: Performed by: EMERGENCY MEDICINE

## 2020-06-12 PROCEDURE — 87040 BLOOD CULTURE FOR BACTERIA: CPT | Performed by: EMERGENCY MEDICINE

## 2020-06-12 PROCEDURE — 84157 ASSAY OF PROTEIN OTHER: CPT | Performed by: INTERNAL MEDICINE

## 2020-06-12 PROCEDURE — 95816 EEG AWAKE AND DROWSY: CPT | Performed by: PSYCHIATRY & NEUROLOGY

## 2020-06-12 PROCEDURE — 99253 IP/OBS CNSLTJ NEW/EST LOW 45: CPT | Performed by: PSYCHIATRY & NEUROLOGY

## 2020-06-12 PROCEDURE — 87102 FUNGUS ISOLATION CULTURE: CPT | Performed by: INTERNAL MEDICINE

## 2020-06-12 PROCEDURE — 88185 FLOWCYTOMETRY/TC ADD-ON: CPT

## 2020-06-12 PROCEDURE — 86617 LYME DISEASE ANTIBODY: CPT | Performed by: INTERNAL MEDICINE

## 2020-06-12 PROCEDURE — 93005 ELECTROCARDIOGRAM TRACING: CPT | Performed by: EMERGENCY MEDICINE

## 2020-06-12 PROCEDURE — 87070 CULTURE OTHR SPECIMN AEROBIC: CPT | Performed by: INTERNAL MEDICINE

## 2020-06-12 PROCEDURE — 93010 ELECTROCARDIOGRAM REPORT: CPT | Performed by: INTERNAL MEDICINE

## 2020-06-12 PROCEDURE — 70450 CT HEAD/BRAIN W/O DYE: CPT

## 2020-06-12 PROCEDURE — 63710000001 INSULIN LISPRO (HUMAN) PER 5 UNITS: Performed by: INTERNAL MEDICINE

## 2020-06-12 PROCEDURE — 99285 EMERGENCY DEPT VISIT HI MDM: CPT

## 2020-06-12 PROCEDURE — 25010000002 ACYCLOVIR PER 5 MG: Performed by: INTERNAL MEDICINE

## 2020-06-12 PROCEDURE — P9612 CATHETERIZE FOR URINE SPEC: HCPCS

## 2020-06-12 PROCEDURE — 25010000003 CEFTRIAXONE PER 250 MG: Performed by: EMERGENCY MEDICINE

## 2020-06-12 PROCEDURE — 5A1955Z RESPIRATORY VENTILATION, GREATER THAN 96 CONSECUTIVE HOURS: ICD-10-PCS | Performed by: INTERNAL MEDICINE

## 2020-06-12 PROCEDURE — 82550 ASSAY OF CK (CPK): CPT | Performed by: INTERNAL MEDICINE

## 2020-06-12 PROCEDURE — 25010000002 PROPOFOL 10 MG/ML EMULSION: Performed by: INTERNAL MEDICINE

## 2020-06-12 PROCEDURE — 94002 VENT MGMT INPAT INIT DAY: CPT

## 2020-06-12 PROCEDURE — 82962 GLUCOSE BLOOD TEST: CPT

## 2020-06-12 PROCEDURE — 87015 SPECIMEN INFECT AGNT CONCNTJ: CPT | Performed by: INTERNAL MEDICINE

## 2020-06-12 PROCEDURE — 81001 URINALYSIS AUTO W/SCOPE: CPT | Performed by: EMERGENCY MEDICINE

## 2020-06-12 PROCEDURE — 25010000002 LORAZEPAM PER 2 MG: Performed by: EMERGENCY MEDICINE

## 2020-06-12 PROCEDURE — 89051 BODY FLUID CELL COUNT: CPT | Performed by: INTERNAL MEDICINE

## 2020-06-12 PROCEDURE — 86788 WEST NILE VIRUS AB IGM: CPT | Performed by: INTERNAL MEDICINE

## 2020-06-12 RX ORDER — HALOPERIDOL 5 MG/ML
2 INJECTION INTRAMUSCULAR EVERY 4 HOURS PRN
Status: DISCONTINUED | OUTPATIENT
Start: 2020-06-12 | End: 2020-07-01

## 2020-06-12 RX ORDER — DEXTROSE MONOHYDRATE 25 G/50ML
25 INJECTION, SOLUTION INTRAVENOUS
Status: DISCONTINUED | OUTPATIENT
Start: 2020-06-12 | End: 2020-07-03 | Stop reason: HOSPADM

## 2020-06-12 RX ORDER — ONDANSETRON 2 MG/ML
4 INJECTION INTRAMUSCULAR; INTRAVENOUS EVERY 6 HOURS PRN
Status: DISCONTINUED | OUTPATIENT
Start: 2020-06-12 | End: 2020-07-03 | Stop reason: HOSPADM

## 2020-06-12 RX ORDER — LEVETIRACETAM 5 MG/ML
500 INJECTION INTRAVASCULAR EVERY 12 HOURS SCHEDULED
Status: DISCONTINUED | OUTPATIENT
Start: 2020-06-12 | End: 2020-06-13

## 2020-06-12 RX ORDER — CHLORHEXIDINE GLUCONATE 0.12 MG/ML
15 RINSE ORAL EVERY 12 HOURS SCHEDULED
Status: DISCONTINUED | OUTPATIENT
Start: 2020-06-12 | End: 2020-06-24

## 2020-06-12 RX ORDER — LEVETIRACETAM 10 MG/ML
1000 INJECTION INTRAVASCULAR ONCE
Status: COMPLETED | OUTPATIENT
Start: 2020-06-12 | End: 2020-06-12

## 2020-06-12 RX ORDER — NOREPINEPHRINE BIT/0.9 % NACL 8 MG/250ML
.02-.3 INFUSION BOTTLE (ML) INTRAVENOUS
Status: DISCONTINUED | OUTPATIENT
Start: 2020-06-13 | End: 2020-06-15

## 2020-06-12 RX ORDER — LORAZEPAM 2 MG/ML
2 INJECTION INTRAMUSCULAR ONCE
Status: COMPLETED | OUTPATIENT
Start: 2020-06-12 | End: 2020-06-12

## 2020-06-12 RX ORDER — LIDOCAINE HYDROCHLORIDE 10 MG/ML
10 INJECTION, SOLUTION INFILTRATION; PERINEURAL ONCE
Status: COMPLETED | OUTPATIENT
Start: 2020-06-12 | End: 2020-06-12

## 2020-06-12 RX ORDER — FENTANYL CITRATE 50 UG/ML
50 INJECTION, SOLUTION INTRAMUSCULAR; INTRAVENOUS
Status: DISCONTINUED | OUTPATIENT
Start: 2020-06-12 | End: 2020-06-17

## 2020-06-12 RX ORDER — LORAZEPAM 2 MG/ML
1 INJECTION INTRAMUSCULAR ONCE
Status: COMPLETED | OUTPATIENT
Start: 2020-06-12 | End: 2020-06-12

## 2020-06-12 RX ORDER — SODIUM CHLORIDE 9 MG/ML
125 INJECTION, SOLUTION INTRAVENOUS CONTINUOUS
Status: DISCONTINUED | OUTPATIENT
Start: 2020-06-12 | End: 2020-06-12

## 2020-06-12 RX ORDER — CEFTRIAXONE SODIUM 2 G/50ML
2 INJECTION, SOLUTION INTRAVENOUS EVERY 12 HOURS
Status: DISCONTINUED | OUTPATIENT
Start: 2020-06-13 | End: 2020-06-16

## 2020-06-12 RX ORDER — ROCURONIUM BROMIDE 10 MG/ML
50 INJECTION, SOLUTION INTRAVENOUS ONCE
Status: COMPLETED | OUTPATIENT
Start: 2020-06-12 | End: 2020-06-12

## 2020-06-12 RX ORDER — FAMOTIDINE 10 MG/ML
20 INJECTION, SOLUTION INTRAVENOUS EVERY 12 HOURS SCHEDULED
Status: DISCONTINUED | OUTPATIENT
Start: 2020-06-12 | End: 2020-06-15

## 2020-06-12 RX ORDER — CEFTRIAXONE SODIUM 2 G/50ML
2 INJECTION, SOLUTION INTRAVENOUS ONCE
Status: COMPLETED | OUTPATIENT
Start: 2020-06-12 | End: 2020-06-12

## 2020-06-12 RX ORDER — SODIUM CHLORIDE, SODIUM LACTATE, POTASSIUM CHLORIDE, CALCIUM CHLORIDE 600; 310; 30; 20 MG/100ML; MG/100ML; MG/100ML; MG/100ML
125 INJECTION, SOLUTION INTRAVENOUS CONTINUOUS
Status: DISCONTINUED | OUTPATIENT
Start: 2020-06-12 | End: 2020-06-15

## 2020-06-12 RX ORDER — SODIUM CHLORIDE 0.9 % (FLUSH) 0.9 %
10 SYRINGE (ML) INJECTION AS NEEDED
Status: DISCONTINUED | OUTPATIENT
Start: 2020-06-12 | End: 2020-07-03 | Stop reason: HOSPADM

## 2020-06-12 RX ORDER — ACETAMINOPHEN 650 MG/1
650 SUPPOSITORY RECTAL EVERY 4 HOURS PRN
Status: DISCONTINUED | OUTPATIENT
Start: 2020-06-12 | End: 2020-06-15

## 2020-06-12 RX ORDER — FENTANYL CITRATE 50 UG/ML
100 INJECTION, SOLUTION INTRAMUSCULAR; INTRAVENOUS
Status: DISCONTINUED | OUTPATIENT
Start: 2020-06-12 | End: 2020-06-19

## 2020-06-12 RX ORDER — SODIUM CHLORIDE 0.9 % (FLUSH) 0.9 %
10 SYRINGE (ML) INJECTION EVERY 12 HOURS SCHEDULED
Status: DISCONTINUED | OUTPATIENT
Start: 2020-06-12 | End: 2020-07-03 | Stop reason: HOSPADM

## 2020-06-12 RX ORDER — NICOTINE POLACRILEX 4 MG
15 LOZENGE BUCCAL
Status: DISCONTINUED | OUTPATIENT
Start: 2020-06-12 | End: 2020-07-03 | Stop reason: HOSPADM

## 2020-06-12 RX ORDER — DIPHENHYDRAMINE HYDROCHLORIDE 50 MG/ML
50 INJECTION INTRAMUSCULAR; INTRAVENOUS ONCE
Status: DISCONTINUED | OUTPATIENT
Start: 2020-06-12 | End: 2020-06-15

## 2020-06-12 RX ORDER — ACETAMINOPHEN 325 MG/1
650 TABLET ORAL EVERY 4 HOURS PRN
Status: DISCONTINUED | OUTPATIENT
Start: 2020-06-12 | End: 2020-06-15

## 2020-06-12 RX ORDER — HALOPERIDOL 5 MG/ML
5 INJECTION INTRAMUSCULAR ONCE
Status: DISCONTINUED | OUTPATIENT
Start: 2020-06-12 | End: 2020-06-15

## 2020-06-12 RX ADMIN — LEVETIRACETAM 1000 MG: 10 INJECTION INTRAVENOUS at 12:36

## 2020-06-12 RX ADMIN — VANCOMYCIN HYDROCHLORIDE 2000 MG: 10 INJECTION, POWDER, LYOPHILIZED, FOR SOLUTION INTRAVENOUS at 23:12

## 2020-06-12 RX ADMIN — SODIUM CHLORIDE 125 ML/HR: 9 INJECTION, SOLUTION INTRAVENOUS at 11:04

## 2020-06-12 RX ADMIN — SODIUM CHLORIDE, PRESERVATIVE FREE 10 ML: 5 INJECTION INTRAVENOUS at 21:04

## 2020-06-12 RX ADMIN — FAMOTIDINE 20 MG: 10 INJECTION, SOLUTION INTRAVENOUS at 20:53

## 2020-06-12 RX ADMIN — Medication 0.02 MCG/KG/MIN: at 23:12

## 2020-06-12 RX ADMIN — SODIUM CHLORIDE, POTASSIUM CHLORIDE, SODIUM LACTATE AND CALCIUM CHLORIDE 125 ML/HR: 600; 310; 30; 20 INJECTION, SOLUTION INTRAVENOUS at 23:12

## 2020-06-12 RX ADMIN — ACYCLOVIR SODIUM 810 MG: 1000 INJECTION, SOLUTION INTRAVENOUS at 21:13

## 2020-06-12 RX ADMIN — VANCOMYCIN HYDROCHLORIDE 2000 MG: 10 INJECTION, POWDER, LYOPHILIZED, FOR SOLUTION INTRAVENOUS at 14:49

## 2020-06-12 RX ADMIN — INSULIN LISPRO 12 UNITS: 100 INJECTION, SOLUTION INTRAVENOUS; SUBCUTANEOUS at 21:13

## 2020-06-12 RX ADMIN — SODIUM CHLORIDE, POTASSIUM CHLORIDE, SODIUM LACTATE AND CALCIUM CHLORIDE 100 ML/HR: 600; 310; 30; 20 INJECTION, SOLUTION INTRAVENOUS at 18:48

## 2020-06-12 RX ADMIN — LEVETIRACETAM 500 MG: 5 INJECTION INTRAVENOUS at 20:53

## 2020-06-12 RX ADMIN — LIDOCAINE HYDROCHLORIDE 5 ML: 10 INJECTION, SOLUTION INFILTRATION; PERINEURAL at 17:59

## 2020-06-12 RX ADMIN — CEFTRIAXONE SODIUM 2 G: 2 INJECTION, SOLUTION INTRAVENOUS at 12:37

## 2020-06-12 RX ADMIN — CHLORHEXIDINE GLUCONATE 15 ML: 1.2 RINSE ORAL at 21:04

## 2020-06-12 RX ADMIN — ACYCLOVIR SODIUM 810 MG: 1000 INJECTION, SOLUTION INTRAVENOUS at 13:19

## 2020-06-12 RX ADMIN — DEXMEDETOMIDINE HYDROCHLORIDE 0.2 MCG/KG/HR: 100 INJECTION, SOLUTION, CONCENTRATE INTRAVENOUS at 18:48

## 2020-06-12 RX ADMIN — LORAZEPAM 1 MG: 2 INJECTION INTRAMUSCULAR; INTRAVENOUS at 12:14

## 2020-06-12 RX ADMIN — ROCURONIUM BROMIDE 50 MG: 10 INJECTION, SOLUTION INTRAVENOUS at 17:15

## 2020-06-12 RX ADMIN — LORAZEPAM 2 MG: 2 INJECTION INTRAMUSCULAR; INTRAVENOUS at 10:59

## 2020-06-12 RX ADMIN — SODIUM CHLORIDE 500 ML: 9 INJECTION, SOLUTION INTRAVENOUS at 10:53

## 2020-06-12 RX ADMIN — PROPOFOL 30 MCG/KG/MIN: 10 INJECTION, EMULSION INTRAVENOUS at 15:37

## 2020-06-12 RX ADMIN — SODIUM CHLORIDE 1000 ML: 9 INJECTION, SOLUTION INTRAVENOUS at 22:02

## 2020-06-13 ENCOUNTER — APPOINTMENT (OUTPATIENT)
Dept: MRI IMAGING | Facility: HOSPITAL | Age: 55
End: 2020-06-13

## 2020-06-13 ENCOUNTER — APPOINTMENT (OUTPATIENT)
Dept: GENERAL RADIOLOGY | Facility: HOSPITAL | Age: 55
End: 2020-06-13

## 2020-06-13 LAB
AMMONIA BLD-SCNC: 41 UMOL/L (ref 16–60)
ANION GAP SERPL CALCULATED.3IONS-SCNC: 12.2 MMOL/L (ref 5–15)
BUN BLD-MCNC: 18 MG/DL (ref 6–20)
BUN/CREAT SERPL: 20.5 (ref 7–25)
CALCIUM SPEC-SCNC: 8.1 MG/DL (ref 8.6–10.5)
CHLORIDE SERPL-SCNC: 105 MMOL/L (ref 98–107)
CO2 SERPL-SCNC: 19.8 MMOL/L (ref 22–29)
CREAT BLD-MCNC: 0.88 MG/DL (ref 0.76–1.27)
DEPRECATED RDW RBC AUTO: 46.1 FL (ref 37–54)
ERYTHROCYTE [DISTWIDTH] IN BLOOD BY AUTOMATED COUNT: 14.3 % (ref 12.3–15.4)
GFR SERPL CREATININE-BSD FRML MDRD: 91 ML/MIN/1.73
GLUCOSE BLD-MCNC: 157 MG/DL (ref 65–99)
GLUCOSE BLDC GLUCOMTR-MCNC: 133 MG/DL (ref 70–130)
GLUCOSE BLDC GLUCOMTR-MCNC: 174 MG/DL (ref 70–130)
GLUCOSE BLDC GLUCOMTR-MCNC: 185 MG/DL (ref 70–130)
GLUCOSE BLDC GLUCOMTR-MCNC: 190 MG/DL (ref 70–130)
GLUCOSE BLDC GLUCOMTR-MCNC: 204 MG/DL (ref 70–130)
GLUCOSE BLDC GLUCOMTR-MCNC: 225 MG/DL (ref 70–130)
GLUCOSE BLDC GLUCOMTR-MCNC: 226 MG/DL (ref 70–130)
GLUCOSE BLDC GLUCOMTR-MCNC: 257 MG/DL (ref 70–130)
HCT VFR BLD AUTO: 34.3 % (ref 37.5–51)
HGB BLD-MCNC: 11.5 G/DL (ref 13–17.7)
HIV1+2 AB SER QL: NORMAL
MCH RBC QN AUTO: 29.3 PG (ref 26.6–33)
MCHC RBC AUTO-ENTMCNC: 33.5 G/DL (ref 31.5–35.7)
MCV RBC AUTO: 87.3 FL (ref 79–97)
PLATELET # BLD AUTO: 263 10*3/MM3 (ref 140–450)
PMV BLD AUTO: 12.1 FL (ref 6–12)
POTASSIUM BLD-SCNC: 4.2 MMOL/L (ref 3.5–5.2)
PROCALCITONIN SERPL-MCNC: 0.47 NG/ML (ref 0.1–0.25)
RBC # BLD AUTO: 3.93 10*6/MM3 (ref 4.14–5.8)
SARS-COV-2 N GENE NPH QL NAA+PROBE: NOT DETECTED
SODIUM BLD-SCNC: 137 MMOL/L (ref 136–145)
TSH SERPL DL<=0.05 MIU/L-ACNC: 4.28 UIU/ML (ref 0.27–4.2)
WBC NRBC COR # BLD: 16.7 10*3/MM3 (ref 3.4–10.8)

## 2020-06-13 PROCEDURE — 25010000002 LEVETRIRACETAM PER 10 MG: Performed by: PSYCHIATRY & NEUROLOGY

## 2020-06-13 PROCEDURE — 84145 PROCALCITONIN (PCT): CPT | Performed by: INTERNAL MEDICINE

## 2020-06-13 PROCEDURE — 70553 MRI BRAIN STEM W/O & W/DYE: CPT

## 2020-06-13 PROCEDURE — 82140 ASSAY OF AMMONIA: CPT | Performed by: INTERNAL MEDICINE

## 2020-06-13 PROCEDURE — 84443 ASSAY THYROID STIM HORMONE: CPT | Performed by: INTERNAL MEDICINE

## 2020-06-13 PROCEDURE — 25010000003 CEFTRIAXONE PER 250 MG: Performed by: INTERNAL MEDICINE

## 2020-06-13 PROCEDURE — 94799 UNLISTED PULMONARY SVC/PX: CPT

## 2020-06-13 PROCEDURE — 94003 VENT MGMT INPAT SUBQ DAY: CPT

## 2020-06-13 PROCEDURE — 63710000001 INSULIN LISPRO (HUMAN) PER 5 UNITS: Performed by: INTERNAL MEDICINE

## 2020-06-13 PROCEDURE — 25010000002 HALOPERIDOL LACTATE PER 5 MG: Performed by: INTERNAL MEDICINE

## 2020-06-13 PROCEDURE — G0432 EIA HIV-1/HIV-2 SCREEN: HCPCS | Performed by: INTERNAL MEDICINE

## 2020-06-13 PROCEDURE — 80048 BASIC METABOLIC PNL TOTAL CA: CPT | Performed by: INTERNAL MEDICINE

## 2020-06-13 PROCEDURE — 25010000003 LEVETIRACETAM IN NACL 0.75% 1000 MG/100ML SOLUTION: Performed by: PSYCHIATRY & NEUROLOGY

## 2020-06-13 PROCEDURE — 82962 GLUCOSE BLOOD TEST: CPT

## 2020-06-13 PROCEDURE — 25010000002 FENTANYL CITRATE (PF) 100 MCG/2ML SOLUTION: Performed by: INTERNAL MEDICINE

## 2020-06-13 PROCEDURE — 25010000002 ACYCLOVIR PER 5 MG: Performed by: INTERNAL MEDICINE

## 2020-06-13 PROCEDURE — 25010000002 LEVETIRACETAM IN NACL 0.82% 500 MG/100ML SOLUTION: Performed by: PSYCHIATRY & NEUROLOGY

## 2020-06-13 PROCEDURE — 74018 RADEX ABDOMEN 1 VIEW: CPT

## 2020-06-13 PROCEDURE — 25010000002 VANCOMYCIN 10 G RECONSTITUTED SOLUTION: Performed by: INTERNAL MEDICINE

## 2020-06-13 PROCEDURE — 85027 COMPLETE CBC AUTOMATED: CPT | Performed by: INTERNAL MEDICINE

## 2020-06-13 PROCEDURE — 0 GADOBENATE DIMEGLUMINE 529 MG/ML SOLUTION: Performed by: INTERNAL MEDICINE

## 2020-06-13 PROCEDURE — 99223 1ST HOSP IP/OBS HIGH 75: CPT | Performed by: INTERNAL MEDICINE

## 2020-06-13 PROCEDURE — A9577 INJ MULTIHANCE: HCPCS | Performed by: INTERNAL MEDICINE

## 2020-06-13 PROCEDURE — 87635 SARS-COV-2 COVID-19 AMP PRB: CPT | Performed by: INTERNAL MEDICINE

## 2020-06-13 PROCEDURE — 99232 SBSQ HOSP IP/OBS MODERATE 35: CPT | Performed by: PSYCHIATRY & NEUROLOGY

## 2020-06-13 RX ORDER — NYSTATIN 100000 [USP'U]/G
POWDER TOPICAL EVERY 12 HOURS SCHEDULED
Status: DISCONTINUED | OUTPATIENT
Start: 2020-06-13 | End: 2020-07-03 | Stop reason: HOSPADM

## 2020-06-13 RX ORDER — LEVETIRACETAM 10 MG/ML
1000 INJECTION INTRAVASCULAR EVERY 12 HOURS SCHEDULED
Status: DISCONTINUED | OUTPATIENT
Start: 2020-06-13 | End: 2020-06-15

## 2020-06-13 RX ADMIN — FENTANYL CITRATE 100 MCG: 50 INJECTION INTRAMUSCULAR; INTRAVENOUS at 11:59

## 2020-06-13 RX ADMIN — ACYCLOVIR SODIUM 810 MG: 1000 INJECTION, SOLUTION INTRAVENOUS at 19:00

## 2020-06-13 RX ADMIN — FENTANYL CITRATE 100 MCG: 50 INJECTION INTRAMUSCULAR; INTRAVENOUS at 17:04

## 2020-06-13 RX ADMIN — ACYCLOVIR SODIUM 810 MG: 1000 INJECTION, SOLUTION INTRAVENOUS at 04:01

## 2020-06-13 RX ADMIN — CEFTRIAXONE SODIUM 2 G: 2 INJECTION, SOLUTION INTRAVENOUS at 19:00

## 2020-06-13 RX ADMIN — DEXMEDETOMIDINE HYDROCHLORIDE 0.2 MCG/KG/HR: 100 INJECTION, SOLUTION, CONCENTRATE INTRAVENOUS at 05:12

## 2020-06-13 RX ADMIN — DEXMEDETOMIDINE HYDROCHLORIDE 0.2 MCG/KG/HR: 100 INJECTION, SOLUTION, CONCENTRATE INTRAVENOUS at 23:27

## 2020-06-13 RX ADMIN — GADOBENATE DIMEGLUMINE 20 ML: 529 INJECTION, SOLUTION INTRAVENOUS at 19:15

## 2020-06-13 RX ADMIN — FENTANYL CITRATE 100 MCG: 50 INJECTION INTRAMUSCULAR; INTRAVENOUS at 23:07

## 2020-06-13 RX ADMIN — FENTANYL CITRATE 100 MCG: 50 INJECTION INTRAMUSCULAR; INTRAVENOUS at 20:42

## 2020-06-13 RX ADMIN — FENTANYL CITRATE 50 MCG: 50 INJECTION, SOLUTION INTRAMUSCULAR; INTRAVENOUS at 18:35

## 2020-06-13 RX ADMIN — INSULIN LISPRO 5 UNITS: 100 INJECTION, SOLUTION INTRAVENOUS; SUBCUTANEOUS at 20:29

## 2020-06-13 RX ADMIN — CHLORHEXIDINE GLUCONATE 15 ML: 1.2 RINSE ORAL at 08:02

## 2020-06-13 RX ADMIN — LEVETIRACETAM 3000 MG: 500 INJECTION, SOLUTION INTRAVENOUS at 09:58

## 2020-06-13 RX ADMIN — FAMOTIDINE 20 MG: 10 INJECTION, SOLUTION INTRAVENOUS at 20:29

## 2020-06-13 RX ADMIN — FENTANYL CITRATE 50 MCG: 50 INJECTION, SOLUTION INTRAMUSCULAR; INTRAVENOUS at 11:30

## 2020-06-13 RX ADMIN — SODIUM CHLORIDE, PRESERVATIVE FREE 10 ML: 5 INJECTION INTRAVENOUS at 21:02

## 2020-06-13 RX ADMIN — DEXMEDETOMIDINE HYDROCHLORIDE 0.3 MCG/KG/HR: 100 INJECTION, SOLUTION, CONCENTRATE INTRAVENOUS at 05:25

## 2020-06-13 RX ADMIN — NYSTATIN: 100000 POWDER TOPICAL at 20:29

## 2020-06-13 RX ADMIN — INSULIN LISPRO 5 UNITS: 100 INJECTION, SOLUTION INTRAVENOUS; SUBCUTANEOUS at 23:56

## 2020-06-13 RX ADMIN — INSULIN LISPRO 3 UNITS: 100 INJECTION, SOLUTION INTRAVENOUS; SUBCUTANEOUS at 11:36

## 2020-06-13 RX ADMIN — INSULIN LISPRO 3 UNITS: 100 INJECTION, SOLUTION INTRAVENOUS; SUBCUTANEOUS at 00:08

## 2020-06-13 RX ADMIN — FAMOTIDINE 20 MG: 10 INJECTION, SOLUTION INTRAVENOUS at 08:01

## 2020-06-13 RX ADMIN — FENTANYL CITRATE 50 MCG: 50 INJECTION, SOLUTION INTRAMUSCULAR; INTRAVENOUS at 14:50

## 2020-06-13 RX ADMIN — VANCOMYCIN HYDROCHLORIDE 2000 MG: 10 INJECTION, POWDER, LYOPHILIZED, FOR SOLUTION INTRAVENOUS at 11:36

## 2020-06-13 RX ADMIN — CHLORHEXIDINE GLUCONATE 15 ML: 1.2 RINSE ORAL at 21:02

## 2020-06-13 RX ADMIN — LEVETIRACETAM 1000 MG: 10 INJECTION INTRAVENOUS at 20:29

## 2020-06-13 RX ADMIN — INSULIN LISPRO 5 UNITS: 100 INJECTION, SOLUTION INTRAVENOUS; SUBCUTANEOUS at 08:00

## 2020-06-13 RX ADMIN — VANCOMYCIN HYDROCHLORIDE 2000 MG: 10 INJECTION, POWDER, LYOPHILIZED, FOR SOLUTION INTRAVENOUS at 23:56

## 2020-06-13 RX ADMIN — LEVETIRACETAM 500 MG: 5 INJECTION INTRAVENOUS at 08:01

## 2020-06-13 RX ADMIN — SODIUM CHLORIDE, PRESERVATIVE FREE 10 ML: 5 INJECTION INTRAVENOUS at 08:11

## 2020-06-13 RX ADMIN — HALOPERIDOL LACTATE 2 MG: 5 INJECTION, SOLUTION INTRAMUSCULAR at 21:00

## 2020-06-13 RX ADMIN — SODIUM CHLORIDE, POTASSIUM CHLORIDE, SODIUM LACTATE AND CALCIUM CHLORIDE 125 ML/HR: 600; 310; 30; 20 INJECTION, SOLUTION INTRAVENOUS at 20:29

## 2020-06-13 RX ADMIN — SODIUM CHLORIDE, POTASSIUM CHLORIDE, SODIUM LACTATE AND CALCIUM CHLORIDE 125 ML/HR: 600; 310; 30; 20 INJECTION, SOLUTION INTRAVENOUS at 12:49

## 2020-06-14 ENCOUNTER — APPOINTMENT (OUTPATIENT)
Dept: NEUROLOGY | Facility: HOSPITAL | Age: 55
End: 2020-06-14

## 2020-06-14 ENCOUNTER — APPOINTMENT (OUTPATIENT)
Dept: GENERAL RADIOLOGY | Facility: HOSPITAL | Age: 55
End: 2020-06-14

## 2020-06-14 LAB
CREAT BLD-MCNC: 0.79 MG/DL (ref 0.76–1.27)
GFR SERPL CREATININE-BSD FRML MDRD: 103 ML/MIN/1.73
GLUCOSE BLDC GLUCOMTR-MCNC: 122 MG/DL (ref 70–130)
GLUCOSE BLDC GLUCOMTR-MCNC: 147 MG/DL (ref 70–130)
GLUCOSE BLDC GLUCOMTR-MCNC: 167 MG/DL (ref 70–130)
GLUCOSE BLDC GLUCOMTR-MCNC: 169 MG/DL (ref 70–130)
GLUCOSE BLDC GLUCOMTR-MCNC: 174 MG/DL (ref 70–130)
GLUCOSE BLDC GLUCOMTR-MCNC: 186 MG/DL (ref 70–130)

## 2020-06-14 PROCEDURE — 25010000002 FENTANYL CITRATE (PF) 100 MCG/2ML SOLUTION: Performed by: INTERNAL MEDICINE

## 2020-06-14 PROCEDURE — 87205 SMEAR GRAM STAIN: CPT | Performed by: INTERNAL MEDICINE

## 2020-06-14 PROCEDURE — 25010000003 CEFTRIAXONE PER 250 MG: Performed by: INTERNAL MEDICINE

## 2020-06-14 PROCEDURE — 99232 SBSQ HOSP IP/OBS MODERATE 35: CPT | Performed by: PSYCHIATRY & NEUROLOGY

## 2020-06-14 PROCEDURE — 25010000002 ACYCLOVIR PER 5 MG: Performed by: INTERNAL MEDICINE

## 2020-06-14 PROCEDURE — 87070 CULTURE OTHR SPECIMN AEROBIC: CPT | Performed by: INTERNAL MEDICINE

## 2020-06-14 PROCEDURE — 3E0G76Z INTRODUCTION OF NUTRITIONAL SUBSTANCE INTO UPPER GI, VIA NATURAL OR ARTIFICIAL OPENING: ICD-10-PCS | Performed by: INTERNAL MEDICINE

## 2020-06-14 PROCEDURE — 95719 EEG PHYS/QHP EA INCR W/O VID: CPT | Performed by: PSYCHIATRY & NEUROLOGY

## 2020-06-14 PROCEDURE — 25010000002 VANCOMYCIN 10 G RECONSTITUTED SOLUTION: Performed by: INTERNAL MEDICINE

## 2020-06-14 PROCEDURE — 82962 GLUCOSE BLOOD TEST: CPT

## 2020-06-14 PROCEDURE — 63710000001 INSULIN LISPRO (HUMAN) PER 5 UNITS: Performed by: INTERNAL MEDICINE

## 2020-06-14 PROCEDURE — 0DH67UZ INSERTION OF FEEDING DEVICE INTO STOMACH, VIA NATURAL OR ARTIFICIAL OPENING: ICD-10-PCS | Performed by: INTERNAL MEDICINE

## 2020-06-14 PROCEDURE — 82565 ASSAY OF CREATININE: CPT | Performed by: INTERNAL MEDICINE

## 2020-06-14 PROCEDURE — 71045 X-RAY EXAM CHEST 1 VIEW: CPT

## 2020-06-14 PROCEDURE — 94799 UNLISTED PULMONARY SVC/PX: CPT

## 2020-06-14 PROCEDURE — 99233 SBSQ HOSP IP/OBS HIGH 50: CPT | Performed by: INTERNAL MEDICINE

## 2020-06-14 PROCEDURE — 94003 VENT MGMT INPAT SUBQ DAY: CPT

## 2020-06-14 PROCEDURE — 25010000003 LEVETIRACETAM IN NACL 0.75% 1000 MG/100ML SOLUTION: Performed by: PSYCHIATRY & NEUROLOGY

## 2020-06-14 RX ADMIN — SODIUM CHLORIDE, POTASSIUM CHLORIDE, SODIUM LACTATE AND CALCIUM CHLORIDE 125 ML/HR: 600; 310; 30; 20 INJECTION, SOLUTION INTRAVENOUS at 19:43

## 2020-06-14 RX ADMIN — NYSTATIN: 100000 POWDER TOPICAL at 20:03

## 2020-06-14 RX ADMIN — ACYCLOVIR SODIUM 810 MG: 1000 INJECTION, SOLUTION INTRAVENOUS at 11:37

## 2020-06-14 RX ADMIN — FENTANYL CITRATE 100 MCG: 50 INJECTION INTRAMUSCULAR; INTRAVENOUS at 19:27

## 2020-06-14 RX ADMIN — INSULIN LISPRO 3 UNITS: 100 INJECTION, SOLUTION INTRAVENOUS; SUBCUTANEOUS at 03:28

## 2020-06-14 RX ADMIN — NYSTATIN: 100000 POWDER TOPICAL at 08:56

## 2020-06-14 RX ADMIN — SODIUM CHLORIDE 200 MG: 9 INJECTION, SOLUTION INTRAVENOUS at 11:25

## 2020-06-14 RX ADMIN — CEFTRIAXONE SODIUM 2 G: 2 INJECTION, SOLUTION INTRAVENOUS at 06:32

## 2020-06-14 RX ADMIN — FENTANYL CITRATE 100 MCG: 50 INJECTION INTRAMUSCULAR; INTRAVENOUS at 08:55

## 2020-06-14 RX ADMIN — LEVETIRACETAM 1000 MG: 10 INJECTION INTRAVENOUS at 20:03

## 2020-06-14 RX ADMIN — CHLORHEXIDINE GLUCONATE 15 ML: 1.2 RINSE ORAL at 20:03

## 2020-06-14 RX ADMIN — VANCOMYCIN HYDROCHLORIDE 2000 MG: 10 INJECTION, POWDER, LYOPHILIZED, FOR SOLUTION INTRAVENOUS at 13:30

## 2020-06-14 RX ADMIN — SODIUM CHLORIDE, PRESERVATIVE FREE 10 ML: 5 INJECTION INTRAVENOUS at 08:56

## 2020-06-14 RX ADMIN — CHLORHEXIDINE GLUCONATE 15 ML: 1.2 RINSE ORAL at 08:56

## 2020-06-14 RX ADMIN — FENTANYL CITRATE 50 MCG: 50 INJECTION, SOLUTION INTRAMUSCULAR; INTRAVENOUS at 07:53

## 2020-06-14 RX ADMIN — SODIUM CHLORIDE, PRESERVATIVE FREE 10 ML: 5 INJECTION INTRAVENOUS at 20:06

## 2020-06-14 RX ADMIN — FENTANYL CITRATE 50 MCG: 50 INJECTION, SOLUTION INTRAMUSCULAR; INTRAVENOUS at 17:13

## 2020-06-14 RX ADMIN — SODIUM CHLORIDE 100 MG: 9 INJECTION, SOLUTION INTRAVENOUS at 20:59

## 2020-06-14 RX ADMIN — LEVETIRACETAM 1000 MG: 10 INJECTION INTRAVENOUS at 08:59

## 2020-06-14 RX ADMIN — INSULIN LISPRO 3 UNITS: 100 INJECTION, SOLUTION INTRAVENOUS; SUBCUTANEOUS at 11:42

## 2020-06-14 RX ADMIN — SODIUM CHLORIDE, POTASSIUM CHLORIDE, SODIUM LACTATE AND CALCIUM CHLORIDE 125 ML/HR: 600; 310; 30; 20 INJECTION, SOLUTION INTRAVENOUS at 11:25

## 2020-06-14 RX ADMIN — INSULIN LISPRO 3 UNITS: 100 INJECTION, SOLUTION INTRAVENOUS; SUBCUTANEOUS at 20:03

## 2020-06-14 RX ADMIN — INSULIN LISPRO 3 UNITS: 100 INJECTION, SOLUTION INTRAVENOUS; SUBCUTANEOUS at 17:02

## 2020-06-14 RX ADMIN — FAMOTIDINE 20 MG: 10 INJECTION, SOLUTION INTRAVENOUS at 20:04

## 2020-06-14 RX ADMIN — ACYCLOVIR SODIUM 810 MG: 1000 INJECTION, SOLUTION INTRAVENOUS at 03:09

## 2020-06-14 RX ADMIN — CEFTRIAXONE SODIUM 2 G: 2 INJECTION, SOLUTION INTRAVENOUS at 19:58

## 2020-06-14 RX ADMIN — DEXMEDETOMIDINE HYDROCHLORIDE 0.3 MCG/KG/HR: 100 INJECTION, SOLUTION, CONCENTRATE INTRAVENOUS at 11:26

## 2020-06-14 RX ADMIN — FAMOTIDINE 20 MG: 10 INJECTION, SOLUTION INTRAVENOUS at 08:59

## 2020-06-15 LAB
BACTERIA SPEC AEROBE CULT: NORMAL
CREAT BLD-MCNC: 0.74 MG/DL (ref 0.76–1.27)
GFR SERPL CREATININE-BSD FRML MDRD: 111 ML/MIN/1.73
GLUCOSE BLDC GLUCOMTR-MCNC: 186 MG/DL (ref 70–130)
GLUCOSE BLDC GLUCOMTR-MCNC: 209 MG/DL (ref 70–130)
GLUCOSE BLDC GLUCOMTR-MCNC: 212 MG/DL (ref 70–130)
GLUCOSE BLDC GLUCOMTR-MCNC: 213 MG/DL (ref 70–130)
GLUCOSE BLDC GLUCOMTR-MCNC: 237 MG/DL (ref 70–130)
GRAM STN SPEC: NORMAL
GRAM STN SPEC: NORMAL
PROCALCITONIN SERPL-MCNC: 0.18 NG/ML (ref 0.1–0.25)
REF LAB TEST METHOD: NORMAL
VANCOMYCIN TROUGH SERPL-MCNC: 17.3 MCG/ML (ref 5–20)
WNV IGG SPEC QL IA: NEGATIVE
WNV IGM CSF QL IA: NEGATIVE

## 2020-06-15 PROCEDURE — 25010000003 CEFTRIAXONE PER 250 MG: Performed by: INTERNAL MEDICINE

## 2020-06-15 PROCEDURE — 99232 SBSQ HOSP IP/OBS MODERATE 35: CPT | Performed by: INTERNAL MEDICINE

## 2020-06-15 PROCEDURE — 25010000002 ENOXAPARIN PER 10 MG: Performed by: INTERNAL MEDICINE

## 2020-06-15 PROCEDURE — 94799 UNLISTED PULMONARY SVC/PX: CPT

## 2020-06-15 PROCEDURE — 25010000002 FOSPHENYTOIN 500 MG PE/10ML SOLUTION 10 ML VIAL: Performed by: PSYCHIATRY & NEUROLOGY

## 2020-06-15 PROCEDURE — 25010000002 VANCOMYCIN 10 G RECONSTITUTED SOLUTION: Performed by: INTERNAL MEDICINE

## 2020-06-15 PROCEDURE — 63710000001 INSULIN GLARGINE PER 5 UNITS: Performed by: INTERNAL MEDICINE

## 2020-06-15 PROCEDURE — 94003 VENT MGMT INPAT SUBQ DAY: CPT

## 2020-06-15 PROCEDURE — 82565 ASSAY OF CREATININE: CPT | Performed by: INTERNAL MEDICINE

## 2020-06-15 PROCEDURE — 63710000001 INSULIN LISPRO (HUMAN) PER 5 UNITS: Performed by: INTERNAL MEDICINE

## 2020-06-15 PROCEDURE — 25010000002 FENTANYL CITRATE (PF) 100 MCG/2ML SOLUTION: Performed by: INTERNAL MEDICINE

## 2020-06-15 PROCEDURE — 25010000002 FOSPHENYTOIN 100 MG PE/2ML SOLUTION: Performed by: PSYCHIATRY & NEUROLOGY

## 2020-06-15 PROCEDURE — 25010000003 LEVETIRACETAM IN NACL 0.75% 1000 MG/100ML SOLUTION: Performed by: PSYCHIATRY & NEUROLOGY

## 2020-06-15 PROCEDURE — 80202 ASSAY OF VANCOMYCIN: CPT | Performed by: INTERNAL MEDICINE

## 2020-06-15 PROCEDURE — 82962 GLUCOSE BLOOD TEST: CPT

## 2020-06-15 PROCEDURE — 99232 SBSQ HOSP IP/OBS MODERATE 35: CPT | Performed by: PSYCHIATRY & NEUROLOGY

## 2020-06-15 PROCEDURE — 84145 PROCALCITONIN (PCT): CPT | Performed by: INTERNAL MEDICINE

## 2020-06-15 RX ORDER — ACETAMINOPHEN 650 MG/1
650 SUPPOSITORY RECTAL EVERY 4 HOURS PRN
Status: DISCONTINUED | OUTPATIENT
Start: 2020-06-15 | End: 2020-07-03 | Stop reason: HOSPADM

## 2020-06-15 RX ORDER — LEVETIRACETAM 10 MG/ML
1000 INJECTION INTRAVASCULAR EVERY 6 HOURS
Status: DISCONTINUED | OUTPATIENT
Start: 2020-06-15 | End: 2020-06-17

## 2020-06-15 RX ORDER — ACETAMINOPHEN 325 MG/1
650 TABLET ORAL EVERY 4 HOURS PRN
Status: DISCONTINUED | OUTPATIENT
Start: 2020-06-15 | End: 2020-07-03 | Stop reason: HOSPADM

## 2020-06-15 RX ORDER — INSULIN GLARGINE 100 [IU]/ML
10 INJECTION, SOLUTION SUBCUTANEOUS EVERY 12 HOURS SCHEDULED
Status: DISCONTINUED | OUTPATIENT
Start: 2020-06-15 | End: 2020-06-16

## 2020-06-15 RX ORDER — FAMOTIDINE 20 MG/1
20 TABLET, FILM COATED ORAL 2 TIMES DAILY
Status: DISCONTINUED | OUTPATIENT
Start: 2020-06-15 | End: 2020-06-19

## 2020-06-15 RX ORDER — LEVOTHYROXINE SODIUM 0.15 MG/1
150 TABLET ORAL
Status: DISCONTINUED | OUTPATIENT
Start: 2020-06-16 | End: 2020-06-27

## 2020-06-15 RX ORDER — FOSPHENYTOIN SODIUM 50 MG/ML
150 INJECTION, SOLUTION INTRAMUSCULAR; INTRAVENOUS EVERY 8 HOURS
Status: DISCONTINUED | OUTPATIENT
Start: 2020-06-15 | End: 2020-06-19

## 2020-06-15 RX ORDER — HYDROCODONE BITARTRATE AND ACETAMINOPHEN 5; 325 MG/1; MG/1
1 TABLET ORAL EVERY 6 HOURS PRN
Status: DISCONTINUED | OUTPATIENT
Start: 2020-06-15 | End: 2020-06-25

## 2020-06-15 RX ORDER — GLIMEPIRIDE 2 MG/1
1 TABLET ORAL 2 TIMES DAILY
COMMUNITY

## 2020-06-15 RX ADMIN — LEVETIRACETAM 1000 MG: 10 INJECTION INTRAVENOUS at 16:23

## 2020-06-15 RX ADMIN — FENTANYL CITRATE 100 MCG: 50 INJECTION INTRAMUSCULAR; INTRAVENOUS at 10:54

## 2020-06-15 RX ADMIN — INSULIN LISPRO 5 UNITS: 100 INJECTION, SOLUTION INTRAVENOUS; SUBCUTANEOUS at 04:21

## 2020-06-15 RX ADMIN — SODIUM CHLORIDE 100 MG: 9 INJECTION, SOLUTION INTRAVENOUS at 09:04

## 2020-06-15 RX ADMIN — SODIUM CHLORIDE, PRESERVATIVE FREE 10 ML: 5 INJECTION INTRAVENOUS at 09:05

## 2020-06-15 RX ADMIN — CEFTRIAXONE SODIUM 2 G: 2 INJECTION, SOLUTION INTRAVENOUS at 18:33

## 2020-06-15 RX ADMIN — INSULIN LISPRO 5 UNITS: 100 INJECTION, SOLUTION INTRAVENOUS; SUBCUTANEOUS at 18:45

## 2020-06-15 RX ADMIN — FAMOTIDINE 20 MG: 20 TABLET, FILM COATED ORAL at 20:35

## 2020-06-15 RX ADMIN — DEXMEDETOMIDINE HYDROCHLORIDE 0.2 MCG/KG/HR: 100 INJECTION, SOLUTION, CONCENTRATE INTRAVENOUS at 04:20

## 2020-06-15 RX ADMIN — FAMOTIDINE 20 MG: 10 INJECTION, SOLUTION INTRAVENOUS at 09:05

## 2020-06-15 RX ADMIN — CHLORHEXIDINE GLUCONATE 15 ML: 1.2 RINSE ORAL at 09:04

## 2020-06-15 RX ADMIN — VANCOMYCIN HYDROCHLORIDE 2000 MG: 10 INJECTION, POWDER, LYOPHILIZED, FOR SOLUTION INTRAVENOUS at 01:03

## 2020-06-15 RX ADMIN — FOSPHENYTOIN SODIUM 150 MG PE: 50 INJECTION, SOLUTION INTRAMUSCULAR; INTRAVENOUS at 21:31

## 2020-06-15 RX ADMIN — CEFTRIAXONE SODIUM 2 G: 2 INJECTION, SOLUTION INTRAVENOUS at 06:07

## 2020-06-15 RX ADMIN — SODIUM CHLORIDE 100 MG: 9 INJECTION, SOLUTION INTRAVENOUS at 20:35

## 2020-06-15 RX ADMIN — SODIUM CHLORIDE 1050 MG PE: 9 INJECTION, SOLUTION INTRAVENOUS at 16:23

## 2020-06-15 RX ADMIN — HYDROCODONE BITARTRATE AND ACETAMINOPHEN 1 TABLET: 5; 325 TABLET ORAL at 13:40

## 2020-06-15 RX ADMIN — VANCOMYCIN HYDROCHLORIDE 2000 MG: 10 INJECTION, POWDER, LYOPHILIZED, FOR SOLUTION INTRAVENOUS at 13:41

## 2020-06-15 RX ADMIN — FENTANYL CITRATE 100 MCG: 50 INJECTION INTRAMUSCULAR; INTRAVENOUS at 17:39

## 2020-06-15 RX ADMIN — FENTANYL CITRATE 100 MCG: 50 INJECTION INTRAMUSCULAR; INTRAVENOUS at 21:35

## 2020-06-15 RX ADMIN — FENTANYL CITRATE 100 MCG: 50 INJECTION INTRAMUSCULAR; INTRAVENOUS at 08:55

## 2020-06-15 RX ADMIN — INSULIN LISPRO 5 UNITS: 100 INJECTION, SOLUTION INTRAVENOUS; SUBCUTANEOUS at 13:40

## 2020-06-15 RX ADMIN — FENTANYL CITRATE 100 MCG: 50 INJECTION INTRAMUSCULAR; INTRAVENOUS at 19:13

## 2020-06-15 RX ADMIN — DEXMEDETOMIDINE HYDROCHLORIDE 0.6 MCG/KG/HR: 100 INJECTION, SOLUTION, CONCENTRATE INTRAVENOUS at 21:08

## 2020-06-15 RX ADMIN — INSULIN LISPRO 5 UNITS: 100 INJECTION, SOLUTION INTRAVENOUS; SUBCUTANEOUS at 09:04

## 2020-06-15 RX ADMIN — NYSTATIN: 100000 POWDER TOPICAL at 09:05

## 2020-06-15 RX ADMIN — CHLORHEXIDINE GLUCONATE 15 ML: 1.2 RINSE ORAL at 20:00

## 2020-06-15 RX ADMIN — LEVETIRACETAM 1000 MG: 10 INJECTION INTRAVENOUS at 09:04

## 2020-06-15 RX ADMIN — INSULIN GLARGINE 10 UNITS: 100 INJECTION, SOLUTION SUBCUTANEOUS at 20:00

## 2020-06-15 RX ADMIN — FENTANYL CITRATE 100 MCG: 50 INJECTION INTRAMUSCULAR; INTRAVENOUS at 02:29

## 2020-06-15 RX ADMIN — NYSTATIN 1 APPLICATION: 100000 POWDER TOPICAL at 20:03

## 2020-06-15 RX ADMIN — ENOXAPARIN SODIUM 40 MG: 40 INJECTION SUBCUTANEOUS at 16:23

## 2020-06-16 ENCOUNTER — APPOINTMENT (OUTPATIENT)
Dept: GENERAL RADIOLOGY | Facility: HOSPITAL | Age: 55
End: 2020-06-16

## 2020-06-16 LAB
ANION GAP SERPL CALCULATED.3IONS-SCNC: 8 MMOL/L (ref 5–15)
BACTERIA SPEC AEROBE CULT: ABNORMAL
BACTERIA SPEC RESP CULT: NO GROWTH
BASOPHILS # BLD AUTO: 0.03 10*3/MM3 (ref 0–0.2)
BASOPHILS NFR BLD AUTO: 0.4 % (ref 0–1.5)
BUN BLD-MCNC: 8 MG/DL (ref 6–20)
BUN/CREAT SERPL: 12.1 (ref 7–25)
CALCIUM SPEC-SCNC: 8 MG/DL (ref 8.6–10.5)
CHLORIDE SERPL-SCNC: 105 MMOL/L (ref 98–107)
CO2 SERPL-SCNC: 26 MMOL/L (ref 22–29)
CREAT BLD-MCNC: 0.66 MG/DL (ref 0.76–1.27)
DEPRECATED RDW RBC AUTO: 43.9 FL (ref 37–54)
EOSINOPHIL # BLD AUTO: 0.23 10*3/MM3 (ref 0–0.4)
EOSINOPHIL NFR BLD AUTO: 2.8 % (ref 0.3–6.2)
ERYTHROCYTE [DISTWIDTH] IN BLOOD BY AUTOMATED COUNT: 13.6 % (ref 12.3–15.4)
GFR SERPL CREATININE-BSD FRML MDRD: 126 ML/MIN/1.73
GLUCOSE BLD-MCNC: 159 MG/DL (ref 65–99)
GLUCOSE BLDC GLUCOMTR-MCNC: 148 MG/DL (ref 70–130)
GLUCOSE BLDC GLUCOMTR-MCNC: 166 MG/DL (ref 70–130)
GLUCOSE BLDC GLUCOMTR-MCNC: 198 MG/DL (ref 70–130)
GLUCOSE BLDC GLUCOMTR-MCNC: 198 MG/DL (ref 70–130)
GLUCOSE BLDC GLUCOMTR-MCNC: 250 MG/DL (ref 70–130)
GRAM STN SPEC: NORMAL
HCT VFR BLD AUTO: 31.6 % (ref 37.5–51)
HGB BLD-MCNC: 10.4 G/DL (ref 13–17.7)
IMM GRANULOCYTES # BLD AUTO: 0.03 10*3/MM3 (ref 0–0.05)
IMM GRANULOCYTES NFR BLD AUTO: 0.4 % (ref 0–0.5)
ISOLATED FROM: ABNORMAL
LYMPHOCYTES # BLD AUTO: 1.3 10*3/MM3 (ref 0.7–3.1)
LYMPHOCYTES NFR BLD AUTO: 15.6 % (ref 19.6–45.3)
MCH RBC QN AUTO: 29.1 PG (ref 26.6–33)
MCHC RBC AUTO-ENTMCNC: 32.9 G/DL (ref 31.5–35.7)
MCV RBC AUTO: 88.3 FL (ref 79–97)
MONOCYTES # BLD AUTO: 1 10*3/MM3 (ref 0.1–0.9)
MONOCYTES NFR BLD AUTO: 12 % (ref 5–12)
NEUTROPHILS # BLD AUTO: 5.73 10*3/MM3 (ref 1.7–7)
NEUTROPHILS NFR BLD AUTO: 68.8 % (ref 42.7–76)
NRBC BLD AUTO-RTO: 0 /100 WBC (ref 0–0.2)
PLATELET # BLD AUTO: 164 10*3/MM3 (ref 140–450)
PMV BLD AUTO: 12.8 FL (ref 6–12)
POTASSIUM BLD-SCNC: 3.5 MMOL/L (ref 3.5–5.2)
RBC # BLD AUTO: 3.58 10*6/MM3 (ref 4.14–5.8)
REAGIN AB CSF QL: NON REACTIVE
SODIUM BLD-SCNC: 139 MMOL/L (ref 136–145)
WBC NRBC COR # BLD: 8.32 10*3/MM3 (ref 3.4–10.8)

## 2020-06-16 PROCEDURE — 25010000003 CEFTRIAXONE PER 250 MG: Performed by: INTERNAL MEDICINE

## 2020-06-16 PROCEDURE — 94799 UNLISTED PULMONARY SVC/PX: CPT

## 2020-06-16 PROCEDURE — 85025 COMPLETE CBC W/AUTO DIFF WBC: CPT | Performed by: INTERNAL MEDICINE

## 2020-06-16 PROCEDURE — 25010000003 LEVETIRACETAM IN NACL 0.75% 1000 MG/100ML SOLUTION: Performed by: PSYCHIATRY & NEUROLOGY

## 2020-06-16 PROCEDURE — 63710000001 INSULIN GLARGINE PER 5 UNITS: Performed by: INTERNAL MEDICINE

## 2020-06-16 PROCEDURE — 80048 BASIC METABOLIC PNL TOTAL CA: CPT | Performed by: INTERNAL MEDICINE

## 2020-06-16 PROCEDURE — 25010000002 FOSPHENYTOIN 100 MG PE/2ML SOLUTION: Performed by: PSYCHIATRY & NEUROLOGY

## 2020-06-16 PROCEDURE — 25010000002 FENTANYL CITRATE (PF) 100 MCG/2ML SOLUTION: Performed by: INTERNAL MEDICINE

## 2020-06-16 PROCEDURE — 99232 SBSQ HOSP IP/OBS MODERATE 35: CPT | Performed by: PSYCHIATRY & NEUROLOGY

## 2020-06-16 PROCEDURE — 25010000002 ENOXAPARIN PER 10 MG: Performed by: INTERNAL MEDICINE

## 2020-06-16 PROCEDURE — 99232 SBSQ HOSP IP/OBS MODERATE 35: CPT | Performed by: INTERNAL MEDICINE

## 2020-06-16 PROCEDURE — 82962 GLUCOSE BLOOD TEST: CPT

## 2020-06-16 PROCEDURE — 94003 VENT MGMT INPAT SUBQ DAY: CPT

## 2020-06-16 PROCEDURE — 63710000001 INSULIN LISPRO (HUMAN) PER 5 UNITS: Performed by: INTERNAL MEDICINE

## 2020-06-16 PROCEDURE — 74018 RADEX ABDOMEN 1 VIEW: CPT

## 2020-06-16 PROCEDURE — 25010000002 VANCOMYCIN 10 G RECONSTITUTED SOLUTION: Performed by: INTERNAL MEDICINE

## 2020-06-16 RX ORDER — HYDROCODONE BITARTRATE AND ACETAMINOPHEN 10; 325 MG/1; MG/1
1 TABLET ORAL EVERY 6 HOURS PRN
Status: DISCONTINUED | OUTPATIENT
Start: 2020-06-16 | End: 2020-06-25

## 2020-06-16 RX ORDER — AMOXICILLIN 250 MG
1 CAPSULE ORAL 2 TIMES DAILY PRN
Status: DISCONTINUED | OUTPATIENT
Start: 2020-06-16 | End: 2020-07-03 | Stop reason: HOSPADM

## 2020-06-16 RX ORDER — POTASSIUM CHLORIDE 7.45 MG/ML
10 INJECTION INTRAVENOUS
Status: DISCONTINUED | OUTPATIENT
Start: 2020-06-16 | End: 2020-07-03 | Stop reason: HOSPADM

## 2020-06-16 RX ORDER — POTASSIUM CHLORIDE 750 MG/1
40 CAPSULE, EXTENDED RELEASE ORAL AS NEEDED
Status: DISCONTINUED | OUTPATIENT
Start: 2020-06-16 | End: 2020-07-03 | Stop reason: HOSPADM

## 2020-06-16 RX ORDER — DEXMEDETOMIDINE HYDROCHLORIDE 4 UG/ML
.2-1.5 INJECTION INTRAVENOUS
Status: DISCONTINUED | OUTPATIENT
Start: 2020-06-16 | End: 2020-06-19

## 2020-06-16 RX ORDER — POTASSIUM CHLORIDE 1.5 G/1.77G
40 POWDER, FOR SOLUTION ORAL AS NEEDED
Status: DISCONTINUED | OUTPATIENT
Start: 2020-06-16 | End: 2020-07-03 | Stop reason: HOSPADM

## 2020-06-16 RX ORDER — INSULIN GLARGINE 100 [IU]/ML
15 INJECTION, SOLUTION SUBCUTANEOUS EVERY 12 HOURS SCHEDULED
Status: DISCONTINUED | OUTPATIENT
Start: 2020-06-16 | End: 2020-06-17

## 2020-06-16 RX ADMIN — FENTANYL CITRATE 100 MCG: 50 INJECTION INTRAMUSCULAR; INTRAVENOUS at 03:13

## 2020-06-16 RX ADMIN — FAMOTIDINE 20 MG: 20 TABLET, FILM COATED ORAL at 21:36

## 2020-06-16 RX ADMIN — LEVETIRACETAM 1000 MG: 10 INJECTION INTRAVENOUS at 00:08

## 2020-06-16 RX ADMIN — INSULIN LISPRO 3 UNITS: 100 INJECTION, SOLUTION INTRAVENOUS; SUBCUTANEOUS at 18:37

## 2020-06-16 RX ADMIN — CEFTRIAXONE SODIUM 2 G: 2 INJECTION, SOLUTION INTRAVENOUS at 06:27

## 2020-06-16 RX ADMIN — HYDROCODONE BITARTRATE AND ACETAMINOPHEN 1 TABLET: 10; 325 TABLET ORAL at 19:38

## 2020-06-16 RX ADMIN — FOSPHENYTOIN SODIUM 150 MG PE: 50 INJECTION, SOLUTION INTRAMUSCULAR; INTRAVENOUS at 06:27

## 2020-06-16 RX ADMIN — SODIUM CHLORIDE, PRESERVATIVE FREE 10 ML: 5 INJECTION INTRAVENOUS at 20:20

## 2020-06-16 RX ADMIN — FENTANYL CITRATE 100 MCG: 50 INJECTION INTRAMUSCULAR; INTRAVENOUS at 06:27

## 2020-06-16 RX ADMIN — LEVOTHYROXINE SODIUM 150 MCG: 150 TABLET ORAL at 06:27

## 2020-06-16 RX ADMIN — FENTANYL CITRATE 100 MCG: 50 INJECTION INTRAMUSCULAR; INTRAVENOUS at 16:14

## 2020-06-16 RX ADMIN — ENOXAPARIN SODIUM 40 MG: 40 INJECTION SUBCUTANEOUS at 16:13

## 2020-06-16 RX ADMIN — POTASSIUM CHLORIDE 40 MEQ: 1.5 POWDER, FOR SOLUTION ORAL at 18:38

## 2020-06-16 RX ADMIN — INSULIN GLARGINE 10 UNITS: 100 INJECTION, SOLUTION SUBCUTANEOUS at 09:07

## 2020-06-16 RX ADMIN — VANCOMYCIN HYDROCHLORIDE 2000 MG: 10 INJECTION, POWDER, LYOPHILIZED, FOR SOLUTION INTRAVENOUS at 03:06

## 2020-06-16 RX ADMIN — CHLORHEXIDINE GLUCONATE 15 ML: 1.2 RINSE ORAL at 09:07

## 2020-06-16 RX ADMIN — FAMOTIDINE 20 MG: 20 TABLET, FILM COATED ORAL at 09:07

## 2020-06-16 RX ADMIN — LEVETIRACETAM 1000 MG: 10 INJECTION INTRAVENOUS at 06:27

## 2020-06-16 RX ADMIN — LEVETIRACETAM 1000 MG: 10 INJECTION INTRAVENOUS at 12:29

## 2020-06-16 RX ADMIN — INSULIN GLARGINE 15 UNITS: 100 INJECTION, SOLUTION SUBCUTANEOUS at 21:37

## 2020-06-16 RX ADMIN — INSULIN LISPRO 5 UNITS: 100 INJECTION, SOLUTION INTRAVENOUS; SUBCUTANEOUS at 00:08

## 2020-06-16 RX ADMIN — CHLORHEXIDINE GLUCONATE 15 ML: 1.2 RINSE ORAL at 21:37

## 2020-06-16 RX ADMIN — DEXMEDETOMIDINE HYDROCHLORIDE 0.1 MCG/KG/HR: 100 INJECTION, SOLUTION, CONCENTRATE INTRAVENOUS at 12:29

## 2020-06-16 RX ADMIN — FOSPHENYTOIN SODIUM 150 MG PE: 50 INJECTION, SOLUTION INTRAMUSCULAR; INTRAVENOUS at 21:36

## 2020-06-16 RX ADMIN — SODIUM CHLORIDE 100 MG: 9 INJECTION, SOLUTION INTRAVENOUS at 10:34

## 2020-06-16 RX ADMIN — INSULIN LISPRO 8 UNITS: 100 INJECTION, SOLUTION INTRAVENOUS; SUBCUTANEOUS at 12:28

## 2020-06-16 RX ADMIN — FENTANYL CITRATE 100 MCG: 50 INJECTION INTRAMUSCULAR; INTRAVENOUS at 20:17

## 2020-06-16 RX ADMIN — LEVETIRACETAM 1000 MG: 10 INJECTION INTRAVENOUS at 18:38

## 2020-06-16 RX ADMIN — INSULIN LISPRO 3 UNITS: 100 INJECTION, SOLUTION INTRAVENOUS; SUBCUTANEOUS at 06:27

## 2020-06-16 RX ADMIN — NYSTATIN: 100000 POWDER TOPICAL at 09:07

## 2020-06-16 RX ADMIN — SODIUM CHLORIDE, PRESERVATIVE FREE 10 ML: 5 INJECTION INTRAVENOUS at 09:07

## 2020-06-16 RX ADMIN — FENTANYL CITRATE 100 MCG: 50 INJECTION INTRAMUSCULAR; INTRAVENOUS at 00:08

## 2020-06-17 LAB
ALBUMIN SERPL-MCNC: 2.8 G/DL (ref 3.5–5.2)
ALBUMIN/GLOB SERPL: 0.8 G/DL
ALP SERPL-CCNC: 63 U/L (ref 39–117)
ALT SERPL W P-5'-P-CCNC: 39 U/L (ref 1–41)
ANION GAP SERPL CALCULATED.3IONS-SCNC: 4 MMOL/L (ref 5–15)
AST SERPL-CCNC: 63 U/L (ref 1–40)
BACTERIA SPEC AEROBE CULT: NORMAL
BACTERIA SPEC ANAEROBE CULT: NORMAL
BILIRUB SERPL-MCNC: 0.4 MG/DL (ref 0.2–1.2)
BUN BLD-MCNC: 8 MG/DL (ref 6–20)
BUN/CREAT SERPL: 15.1 (ref 7–25)
CALCIUM SPEC-SCNC: 8.4 MG/DL (ref 8.6–10.5)
CHLORIDE SERPL-SCNC: 105 MMOL/L (ref 98–107)
CO2 SERPL-SCNC: 30 MMOL/L (ref 22–29)
CREAT BLD-MCNC: 0.53 MG/DL (ref 0.76–1.27)
GFR SERPL CREATININE-BSD FRML MDRD: >150 ML/MIN/1.73
GLOBULIN UR ELPH-MCNC: 3.3 GM/DL
GLUCOSE BLD-MCNC: 231 MG/DL (ref 65–99)
GLUCOSE BLDC GLUCOMTR-MCNC: 181 MG/DL (ref 70–130)
GLUCOSE BLDC GLUCOMTR-MCNC: 240 MG/DL (ref 70–130)
GLUCOSE BLDC GLUCOMTR-MCNC: 247 MG/DL (ref 70–130)
GLUCOSE BLDC GLUCOMTR-MCNC: 266 MG/DL (ref 70–130)
GLUCOSE BLDC GLUCOMTR-MCNC: 289 MG/DL (ref 70–130)
POTASSIUM BLD-SCNC: 3.9 MMOL/L (ref 3.5–5.2)
PROT SERPL-MCNC: 6.1 G/DL (ref 6–8.5)
SODIUM BLD-SCNC: 139 MMOL/L (ref 136–145)

## 2020-06-17 PROCEDURE — 25010000002 FOSPHENYTOIN 100 MG PE/2ML SOLUTION: Performed by: PSYCHIATRY & NEUROLOGY

## 2020-06-17 PROCEDURE — 94799 UNLISTED PULMONARY SVC/PX: CPT

## 2020-06-17 PROCEDURE — 25010000002 FENTANYL CITRATE (PF) 100 MCG/2ML SOLUTION: Performed by: INTERNAL MEDICINE

## 2020-06-17 PROCEDURE — 63710000001 INSULIN REGULAR HUMAN PER 5 UNITS: Performed by: INTERNAL MEDICINE

## 2020-06-17 PROCEDURE — 63710000001 INSULIN GLARGINE PER 5 UNITS: Performed by: INTERNAL MEDICINE

## 2020-06-17 PROCEDURE — 25010000002 ENOXAPARIN PER 10 MG: Performed by: INTERNAL MEDICINE

## 2020-06-17 PROCEDURE — 63710000001 INSULIN LISPRO (HUMAN) PER 5 UNITS: Performed by: INTERNAL MEDICINE

## 2020-06-17 PROCEDURE — 80053 COMPREHEN METABOLIC PANEL: CPT | Performed by: INTERNAL MEDICINE

## 2020-06-17 PROCEDURE — 99231 SBSQ HOSP IP/OBS SF/LOW 25: CPT | Performed by: PSYCHIATRY & NEUROLOGY

## 2020-06-17 PROCEDURE — 25010000002 METHYLPREDNISOLONE PER 125 MG: Performed by: PSYCHIATRY & NEUROLOGY

## 2020-06-17 PROCEDURE — 94003 VENT MGMT INPAT SUBQ DAY: CPT

## 2020-06-17 PROCEDURE — 82962 GLUCOSE BLOOD TEST: CPT

## 2020-06-17 PROCEDURE — 25010000003 LEVETIRACETAM IN NACL 0.75% 1000 MG/100ML SOLUTION: Performed by: PSYCHIATRY & NEUROLOGY

## 2020-06-17 RX ORDER — LEVETIRACETAM 10 MG/ML
1000 INJECTION INTRAVASCULAR EVERY 12 HOURS SCHEDULED
Status: COMPLETED | OUTPATIENT
Start: 2020-06-18 | End: 2020-06-22

## 2020-06-17 RX ORDER — FENTANYL CITRATE 50 UG/ML
50 INJECTION, SOLUTION INTRAMUSCULAR; INTRAVENOUS
Status: DISCONTINUED | OUTPATIENT
Start: 2020-06-17 | End: 2020-06-22

## 2020-06-17 RX ORDER — INSULIN GLARGINE 100 [IU]/ML
20 INJECTION, SOLUTION SUBCUTANEOUS EVERY 12 HOURS SCHEDULED
Status: DISCONTINUED | OUTPATIENT
Start: 2020-06-17 | End: 2020-06-25

## 2020-06-17 RX ORDER — LEVETIRACETAM 10 MG/ML
1000 INJECTION INTRAVASCULAR EVERY 12 HOURS SCHEDULED
Status: DISCONTINUED | OUTPATIENT
Start: 2020-06-17 | End: 2020-06-17

## 2020-06-17 RX ADMIN — LEVETIRACETAM 1000 MG: 10 INJECTION INTRAVENOUS at 00:12

## 2020-06-17 RX ADMIN — LEVETIRACETAM 1000 MG: 10 INJECTION INTRAVENOUS at 11:49

## 2020-06-17 RX ADMIN — INSULIN GLARGINE 20 UNITS: 100 INJECTION, SOLUTION SUBCUTANEOUS at 20:50

## 2020-06-17 RX ADMIN — FOSPHENYTOIN SODIUM 150 MG PE: 50 INJECTION, SOLUTION INTRAMUSCULAR; INTRAVENOUS at 06:04

## 2020-06-17 RX ADMIN — ENOXAPARIN SODIUM 40 MG: 40 INJECTION SUBCUTANEOUS at 15:35

## 2020-06-17 RX ADMIN — SODIUM CHLORIDE, PRESERVATIVE FREE 10 ML: 5 INJECTION INTRAVENOUS at 20:55

## 2020-06-17 RX ADMIN — FENTANYL CITRATE 100 MCG: 50 INJECTION INTRAMUSCULAR; INTRAVENOUS at 20:36

## 2020-06-17 RX ADMIN — DEXMEDETOMIDINE HYDROCHLORIDE 0.5 MCG/KG/HR: 4 INJECTION INTRAVENOUS at 06:00

## 2020-06-17 RX ADMIN — FENTANYL CITRATE 50 MCG: 50 INJECTION, SOLUTION INTRAMUSCULAR; INTRAVENOUS at 04:44

## 2020-06-17 RX ADMIN — CHLORHEXIDINE GLUCONATE 15 ML: 1.2 RINSE ORAL at 08:14

## 2020-06-17 RX ADMIN — FOSPHENYTOIN SODIUM 150 MG PE: 50 INJECTION, SOLUTION INTRAMUSCULAR; INTRAVENOUS at 22:25

## 2020-06-17 RX ADMIN — FAMOTIDINE 20 MG: 20 TABLET, FILM COATED ORAL at 08:14

## 2020-06-17 RX ADMIN — NYSTATIN: 100000 POWDER TOPICAL at 11:48

## 2020-06-17 RX ADMIN — SODIUM CHLORIDE 500 MG: 900 INJECTION INTRAVENOUS at 08:14

## 2020-06-17 RX ADMIN — DEXMEDETOMIDINE HYDROCHLORIDE 0.2 MCG/KG/HR: 4 INJECTION INTRAVENOUS at 13:20

## 2020-06-17 RX ADMIN — LEVETIRACETAM 1000 MG: 10 INJECTION INTRAVENOUS at 23:43

## 2020-06-17 RX ADMIN — DEXMEDETOMIDINE HYDROCHLORIDE 0.5 MCG/KG/HR: 4 INJECTION INTRAVENOUS at 00:11

## 2020-06-17 RX ADMIN — DEXMEDETOMIDINE HYDROCHLORIDE 0.3 MCG/KG/HR: 4 INJECTION INTRAVENOUS at 03:14

## 2020-06-17 RX ADMIN — INSULIN LISPRO 5 UNITS: 100 INJECTION, SOLUTION INTRAVENOUS; SUBCUTANEOUS at 06:57

## 2020-06-17 RX ADMIN — CHLORHEXIDINE GLUCONATE 15 ML: 1.2 RINSE ORAL at 20:50

## 2020-06-17 RX ADMIN — LEVOTHYROXINE SODIUM 150 MCG: 150 TABLET ORAL at 06:02

## 2020-06-17 RX ADMIN — FAMOTIDINE 20 MG: 20 TABLET, FILM COATED ORAL at 20:51

## 2020-06-17 RX ADMIN — POTASSIUM CHLORIDE 40 MEQ: 1.5 POWDER, FOR SOLUTION ORAL at 00:21

## 2020-06-17 RX ADMIN — DEXMEDETOMIDINE HYDROCHLORIDE 0.2 MCG/KG/HR: 4 INJECTION INTRAVENOUS at 23:43

## 2020-06-17 RX ADMIN — INSULIN HUMAN 12 UNITS: 100 INJECTION, SOLUTION PARENTERAL at 17:26

## 2020-06-17 RX ADMIN — NYSTATIN: 100000 POWDER TOPICAL at 20:51

## 2020-06-17 RX ADMIN — INSULIN GLARGINE 15 UNITS: 100 INJECTION, SOLUTION SUBCUTANEOUS at 08:15

## 2020-06-17 RX ADMIN — SODIUM CHLORIDE, PRESERVATIVE FREE 10 ML: 5 INJECTION INTRAVENOUS at 08:15

## 2020-06-17 RX ADMIN — LEVETIRACETAM 1000 MG: 10 INJECTION INTRAVENOUS at 06:03

## 2020-06-17 RX ADMIN — FOSPHENYTOIN SODIUM 150 MG PE: 50 INJECTION, SOLUTION INTRAMUSCULAR; INTRAVENOUS at 13:30

## 2020-06-17 RX ADMIN — INSULIN LISPRO 8 UNITS: 100 INJECTION, SOLUTION INTRAVENOUS; SUBCUTANEOUS at 11:48

## 2020-06-18 LAB
ALBUMIN SERPL-MCNC: 2.5 G/DL (ref 3.5–5.2)
ALBUMIN/GLOB SERPL: 0.7 G/DL
ALP SERPL-CCNC: 67 U/L (ref 39–117)
ALT SERPL W P-5'-P-CCNC: 55 U/L (ref 1–41)
ANION GAP SERPL CALCULATED.3IONS-SCNC: 6.2 MMOL/L (ref 5–15)
AST SERPL-CCNC: 51 U/L (ref 1–40)
BILIRUB SERPL-MCNC: 0.2 MG/DL (ref 0.2–1.2)
BUN BLD-MCNC: 10 MG/DL (ref 6–20)
BUN/CREAT SERPL: 17.9 (ref 7–25)
CALCIUM SPEC-SCNC: 8.1 MG/DL (ref 8.6–10.5)
CHLORIDE SERPL-SCNC: 102 MMOL/L (ref 98–107)
CO2 SERPL-SCNC: 29.8 MMOL/L (ref 22–29)
CREAT BLD-MCNC: 0.56 MG/DL (ref 0.76–1.27)
DEPRECATED RDW RBC AUTO: 47.1 FL (ref 37–54)
ERYTHROCYTE [DISTWIDTH] IN BLOOD BY AUTOMATED COUNT: 13.7 % (ref 12.3–15.4)
GFR SERPL CREATININE-BSD FRML MDRD: >150 ML/MIN/1.73
GLOBULIN UR ELPH-MCNC: 3.4 GM/DL
GLUCOSE BLD-MCNC: 244 MG/DL (ref 65–99)
GLUCOSE BLDC GLUCOMTR-MCNC: 146 MG/DL (ref 70–130)
GLUCOSE BLDC GLUCOMTR-MCNC: 177 MG/DL (ref 70–130)
GLUCOSE BLDC GLUCOMTR-MCNC: 222 MG/DL (ref 70–130)
GLUCOSE BLDC GLUCOMTR-MCNC: 234 MG/DL (ref 70–130)
HCT VFR BLD AUTO: 35 % (ref 37.5–51)
HGB BLD-MCNC: 11.1 G/DL (ref 13–17.7)
MAGNESIUM SERPL-MCNC: 2.1 MG/DL (ref 1.6–2.6)
MCH RBC QN AUTO: 29.3 PG (ref 26.6–33)
MCHC RBC AUTO-ENTMCNC: 31.7 G/DL (ref 31.5–35.7)
MCV RBC AUTO: 92.3 FL (ref 79–97)
PHENYTOIN SERPL-MCNC: 6.4 MCG/ML (ref 10–20)
PHOSPHATE SERPL-MCNC: 1.6 MG/DL (ref 2.5–4.5)
PLATELET # BLD AUTO: 149 10*3/MM3 (ref 140–450)
PMV BLD AUTO: 12.1 FL (ref 6–12)
POTASSIUM BLD-SCNC: 3.8 MMOL/L (ref 3.5–5.2)
PROT SERPL-MCNC: 5.9 G/DL (ref 6–8.5)
RBC # BLD AUTO: 3.79 10*6/MM3 (ref 4.14–5.8)
SODIUM BLD-SCNC: 138 MMOL/L (ref 136–145)
T GONDII IGG SERPL IA-ACNC: <3 IU/ML
T GONDII IGM SER IA-ACNC: <3 AU/ML
WBC NRBC COR # BLD: 7.32 10*3/MM3 (ref 3.4–10.8)

## 2020-06-18 PROCEDURE — 25010000002 FENTANYL CITRATE (PF) 100 MCG/2ML SOLUTION: Performed by: INTERNAL MEDICINE

## 2020-06-18 PROCEDURE — 63710000001 INSULIN GLARGINE PER 5 UNITS: Performed by: INTERNAL MEDICINE

## 2020-06-18 PROCEDURE — 63710000001 INSULIN REGULAR HUMAN PER 5 UNITS: Performed by: INTERNAL MEDICINE

## 2020-06-18 PROCEDURE — 84100 ASSAY OF PHOSPHORUS: CPT | Performed by: INTERNAL MEDICINE

## 2020-06-18 PROCEDURE — 25010000002 ENOXAPARIN PER 10 MG: Performed by: INTERNAL MEDICINE

## 2020-06-18 PROCEDURE — 85027 COMPLETE CBC AUTOMATED: CPT | Performed by: INTERNAL MEDICINE

## 2020-06-18 PROCEDURE — 83735 ASSAY OF MAGNESIUM: CPT | Performed by: INTERNAL MEDICINE

## 2020-06-18 PROCEDURE — 99232 SBSQ HOSP IP/OBS MODERATE 35: CPT | Performed by: PSYCHIATRY & NEUROLOGY

## 2020-06-18 PROCEDURE — 94799 UNLISTED PULMONARY SVC/PX: CPT

## 2020-06-18 PROCEDURE — 80185 ASSAY OF PHENYTOIN TOTAL: CPT | Performed by: PSYCHIATRY & NEUROLOGY

## 2020-06-18 PROCEDURE — 94003 VENT MGMT INPAT SUBQ DAY: CPT

## 2020-06-18 PROCEDURE — 25010000002 FOSPHENYTOIN 100 MG PE/2ML SOLUTION: Performed by: PSYCHIATRY & NEUROLOGY

## 2020-06-18 PROCEDURE — 80053 COMPREHEN METABOLIC PANEL: CPT | Performed by: INTERNAL MEDICINE

## 2020-06-18 PROCEDURE — 25010000003 LEVETIRACETAM IN NACL 0.75% 1000 MG/100ML SOLUTION: Performed by: PSYCHIATRY & NEUROLOGY

## 2020-06-18 PROCEDURE — 82962 GLUCOSE BLOOD TEST: CPT

## 2020-06-18 RX ORDER — SODIUM CHLORIDE, SODIUM LACTATE, POTASSIUM CHLORIDE, CALCIUM CHLORIDE 600; 310; 30; 20 MG/100ML; MG/100ML; MG/100ML; MG/100ML
100 INJECTION, SOLUTION INTRAVENOUS CONTINUOUS
Status: DISCONTINUED | OUTPATIENT
Start: 2020-06-18 | End: 2020-06-20

## 2020-06-18 RX ADMIN — SODIUM CHLORIDE, PRESERVATIVE FREE 10 ML: 5 INJECTION INTRAVENOUS at 08:10

## 2020-06-18 RX ADMIN — FOSPHENYTOIN SODIUM 150 MG PE: 50 INJECTION, SOLUTION INTRAMUSCULAR; INTRAVENOUS at 14:04

## 2020-06-18 RX ADMIN — INSULIN HUMAN 8 UNITS: 100 INJECTION, SOLUTION PARENTERAL at 12:04

## 2020-06-18 RX ADMIN — FAMOTIDINE 20 MG: 20 TABLET, FILM COATED ORAL at 20:42

## 2020-06-18 RX ADMIN — LEVETIRACETAM 1000 MG: 10 INJECTION INTRAVENOUS at 23:48

## 2020-06-18 RX ADMIN — DEXMEDETOMIDINE HYDROCHLORIDE 0.2 MCG/KG/HR: 4 INJECTION INTRAVENOUS at 18:17

## 2020-06-18 RX ADMIN — POTASSIUM PHOSPHATE, MONOBASIC AND POTASSIUM PHOSPHATE, DIBASIC 30 MMOL: 224; 236 INJECTION, SOLUTION, CONCENTRATE INTRAVENOUS at 06:35

## 2020-06-18 RX ADMIN — FENTANYL CITRATE 100 MCG: 50 INJECTION INTRAMUSCULAR; INTRAVENOUS at 12:08

## 2020-06-18 RX ADMIN — FOSPHENYTOIN SODIUM 150 MG PE: 50 INJECTION, SOLUTION INTRAMUSCULAR; INTRAVENOUS at 05:43

## 2020-06-18 RX ADMIN — INSULIN HUMAN 4 UNITS: 100 INJECTION, SOLUTION PARENTERAL at 17:47

## 2020-06-18 RX ADMIN — INSULIN GLARGINE 20 UNITS: 100 INJECTION, SOLUTION SUBCUTANEOUS at 08:07

## 2020-06-18 RX ADMIN — INSULIN HUMAN 8 UNITS: 100 INJECTION, SOLUTION PARENTERAL at 06:13

## 2020-06-18 RX ADMIN — FENTANYL CITRATE 100 MCG: 50 INJECTION INTRAMUSCULAR; INTRAVENOUS at 10:02

## 2020-06-18 RX ADMIN — LEVOTHYROXINE SODIUM 150 MCG: 150 TABLET ORAL at 05:43

## 2020-06-18 RX ADMIN — INSULIN GLARGINE 20 UNITS: 100 INJECTION, SOLUTION SUBCUTANEOUS at 20:42

## 2020-06-18 RX ADMIN — FOSPHENYTOIN SODIUM 150 MG PE: 50 INJECTION, SOLUTION INTRAMUSCULAR; INTRAVENOUS at 22:40

## 2020-06-18 RX ADMIN — ENOXAPARIN SODIUM 40 MG: 40 INJECTION SUBCUTANEOUS at 15:14

## 2020-06-18 RX ADMIN — NYSTATIN: 100000 POWDER TOPICAL at 08:08

## 2020-06-18 RX ADMIN — FAMOTIDINE 20 MG: 20 TABLET, FILM COATED ORAL at 08:08

## 2020-06-18 RX ADMIN — CHLORHEXIDINE GLUCONATE 15 ML: 1.2 RINSE ORAL at 08:08

## 2020-06-18 RX ADMIN — SODIUM CHLORIDE, POTASSIUM CHLORIDE, SODIUM LACTATE AND CALCIUM CHLORIDE 100 ML/HR: 600; 310; 30; 20 INJECTION, SOLUTION INTRAVENOUS at 18:20

## 2020-06-18 RX ADMIN — NYSTATIN: 100000 POWDER TOPICAL at 20:42

## 2020-06-18 RX ADMIN — LEVETIRACETAM 1000 MG: 10 INJECTION INTRAVENOUS at 13:08

## 2020-06-18 RX ADMIN — INSULIN HUMAN 4 UNITS: 100 INJECTION, SOLUTION PARENTERAL at 00:04

## 2020-06-18 RX ADMIN — CHLORHEXIDINE GLUCONATE 15 ML: 1.2 RINSE ORAL at 20:42

## 2020-06-18 RX ADMIN — SODIUM CHLORIDE, PRESERVATIVE FREE 10 ML: 5 INJECTION INTRAVENOUS at 20:43

## 2020-06-18 RX ADMIN — FENTANYL CITRATE 50 MCG: 50 INJECTION, SOLUTION INTRAMUSCULAR; INTRAVENOUS at 02:14

## 2020-06-19 LAB
ALBUMIN SERPL-MCNC: 2.5 G/DL (ref 3.5–5.2)
ALBUMIN/GLOB SERPL: 0.8 G/DL
ALP SERPL-CCNC: 81 U/L (ref 39–117)
ALT SERPL W P-5'-P-CCNC: 98 U/L (ref 1–41)
ANION GAP SERPL CALCULATED.3IONS-SCNC: 8.3 MMOL/L (ref 5–15)
AST SERPL-CCNC: 137 U/L (ref 1–40)
BILIRUB SERPL-MCNC: 0.2 MG/DL (ref 0.2–1.2)
BUN BLD-MCNC: 12 MG/DL (ref 6–20)
BUN/CREAT SERPL: 21.8 (ref 7–25)
CALCIUM SPEC-SCNC: 7.8 MG/DL (ref 8.6–10.5)
CHLORIDE SERPL-SCNC: 101 MMOL/L (ref 98–107)
CO2 SERPL-SCNC: 29.7 MMOL/L (ref 22–29)
CREAT BLD-MCNC: 0.55 MG/DL (ref 0.76–1.27)
DEPRECATED RDW RBC AUTO: 48.2 FL (ref 37–54)
ERYTHROCYTE [DISTWIDTH] IN BLOOD BY AUTOMATED COUNT: 14.5 % (ref 12.3–15.4)
GFR SERPL CREATININE-BSD FRML MDRD: >150 ML/MIN/1.73
GLOBULIN UR ELPH-MCNC: 3.2 GM/DL
GLUCOSE BLD-MCNC: 204 MG/DL (ref 65–99)
GLUCOSE BLDC GLUCOMTR-MCNC: 149 MG/DL (ref 70–130)
GLUCOSE BLDC GLUCOMTR-MCNC: 158 MG/DL (ref 70–130)
GLUCOSE BLDC GLUCOMTR-MCNC: 190 MG/DL (ref 70–130)
GLUCOSE BLDC GLUCOMTR-MCNC: 200 MG/DL (ref 70–130)
GLUCOSE BLDC GLUCOMTR-MCNC: 204 MG/DL (ref 70–130)
HCT VFR BLD AUTO: 34.1 % (ref 37.5–51)
HGB BLD-MCNC: 11.1 G/DL (ref 13–17.7)
MCH RBC QN AUTO: 29.4 PG (ref 26.6–33)
MCHC RBC AUTO-ENTMCNC: 32.6 G/DL (ref 31.5–35.7)
MCV RBC AUTO: 90.2 FL (ref 79–97)
PHOSPHATE SERPL-MCNC: 2.3 MG/DL (ref 2.5–4.5)
PLATELET # BLD AUTO: 213 10*3/MM3 (ref 140–450)
PMV BLD AUTO: 14.4 FL (ref 6–12)
POTASSIUM BLD-SCNC: 3.8 MMOL/L (ref 3.5–5.2)
PROT SERPL-MCNC: 5.7 G/DL (ref 6–8.5)
RBC # BLD AUTO: 3.78 10*6/MM3 (ref 4.14–5.8)
SODIUM BLD-SCNC: 139 MMOL/L (ref 136–145)
WBC NRBC COR # BLD: 8.26 10*3/MM3 (ref 3.4–10.8)

## 2020-06-19 PROCEDURE — 25010000002 FOSPHENYTOIN 100 MG PE/2ML SOLUTION: Performed by: PSYCHIATRY & NEUROLOGY

## 2020-06-19 PROCEDURE — 94799 UNLISTED PULMONARY SVC/PX: CPT

## 2020-06-19 PROCEDURE — 85027 COMPLETE CBC AUTOMATED: CPT | Performed by: INTERNAL MEDICINE

## 2020-06-19 PROCEDURE — 99232 SBSQ HOSP IP/OBS MODERATE 35: CPT | Performed by: PSYCHIATRY & NEUROLOGY

## 2020-06-19 PROCEDURE — 63710000001 INSULIN REGULAR HUMAN PER 5 UNITS: Performed by: INTERNAL MEDICINE

## 2020-06-19 PROCEDURE — 25010000003 LEVETIRACETAM IN NACL 0.75% 1000 MG/100ML SOLUTION: Performed by: PSYCHIATRY & NEUROLOGY

## 2020-06-19 PROCEDURE — 84100 ASSAY OF PHOSPHORUS: CPT | Performed by: INTERNAL MEDICINE

## 2020-06-19 PROCEDURE — 25010000002 ENOXAPARIN PER 10 MG: Performed by: INTERNAL MEDICINE

## 2020-06-19 PROCEDURE — 80053 COMPREHEN METABOLIC PANEL: CPT | Performed by: INTERNAL MEDICINE

## 2020-06-19 PROCEDURE — 82962 GLUCOSE BLOOD TEST: CPT

## 2020-06-19 PROCEDURE — 25010000002 FENTANYL CITRATE (PF) 100 MCG/2ML SOLUTION: Performed by: INTERNAL MEDICINE

## 2020-06-19 PROCEDURE — 63710000001 INSULIN GLARGINE PER 5 UNITS: Performed by: INTERNAL MEDICINE

## 2020-06-19 RX ORDER — FENTANYL CITRATE 50 UG/ML
100 INJECTION, SOLUTION INTRAMUSCULAR; INTRAVENOUS
Status: DISCONTINUED | OUTPATIENT
Start: 2020-06-19 | End: 2020-07-01

## 2020-06-19 RX ADMIN — FENTANYL CITRATE 100 MCG: 50 INJECTION, SOLUTION INTRAMUSCULAR; INTRAVENOUS at 12:26

## 2020-06-19 RX ADMIN — POTASSIUM & SODIUM PHOSPHATES POWDER PACK 280-160-250 MG 2 PACKET: 280-160-250 PACK at 15:43

## 2020-06-19 RX ADMIN — FAMOTIDINE 20 MG: 20 TABLET, FILM COATED ORAL at 09:58

## 2020-06-19 RX ADMIN — SODIUM CHLORIDE, PRESERVATIVE FREE 10 ML: 5 INJECTION INTRAVENOUS at 08:08

## 2020-06-19 RX ADMIN — CHLORHEXIDINE GLUCONATE 15 ML: 1.2 RINSE ORAL at 08:08

## 2020-06-19 RX ADMIN — LEVOTHYROXINE SODIUM 150 MCG: 150 TABLET ORAL at 05:10

## 2020-06-19 RX ADMIN — POTASSIUM & SODIUM PHOSPHATES POWDER PACK 280-160-250 MG 2 PACKET: 280-160-250 PACK at 19:18

## 2020-06-19 RX ADMIN — FENTANYL CITRATE 100 MCG: 50 INJECTION, SOLUTION INTRAMUSCULAR; INTRAVENOUS at 21:12

## 2020-06-19 RX ADMIN — FENTANYL CITRATE 100 MCG: 50 INJECTION, SOLUTION INTRAMUSCULAR; INTRAVENOUS at 19:18

## 2020-06-19 RX ADMIN — FOSPHENYTOIN SODIUM 150 MG PE: 50 INJECTION, SOLUTION INTRAMUSCULAR; INTRAVENOUS at 05:10

## 2020-06-19 RX ADMIN — LEVETIRACETAM 1000 MG: 10 INJECTION INTRAVENOUS at 15:42

## 2020-06-19 RX ADMIN — INSULIN GLARGINE 20 UNITS: 100 INJECTION, SOLUTION SUBCUTANEOUS at 08:13

## 2020-06-19 RX ADMIN — FENTANYL CITRATE 100 MCG: 50 INJECTION, SOLUTION INTRAMUSCULAR; INTRAVENOUS at 15:20

## 2020-06-19 RX ADMIN — FENTANYL CITRATE 100 MCG: 50 INJECTION, SOLUTION INTRAMUSCULAR; INTRAVENOUS at 13:04

## 2020-06-19 RX ADMIN — NYSTATIN: 100000 POWDER TOPICAL at 21:13

## 2020-06-19 RX ADMIN — INSULIN HUMAN 8 UNITS: 100 INJECTION, SOLUTION PARENTERAL at 19:18

## 2020-06-19 RX ADMIN — FENTANYL CITRATE 100 MCG: 50 INJECTION INTRAMUSCULAR; INTRAVENOUS at 11:02

## 2020-06-19 RX ADMIN — ENOXAPARIN SODIUM 40 MG: 40 INJECTION SUBCUTANEOUS at 15:42

## 2020-06-19 RX ADMIN — FENTANYL CITRATE 100 MCG: 50 INJECTION, SOLUTION INTRAMUSCULAR; INTRAVENOUS at 18:25

## 2020-06-19 RX ADMIN — NYSTATIN 1 APPLICATION: 100000 POWDER TOPICAL at 08:09

## 2020-06-19 RX ADMIN — INSULIN HUMAN 8 UNITS: 100 INJECTION, SOLUTION PARENTERAL at 06:27

## 2020-06-19 RX ADMIN — FENTANYL CITRATE 100 MCG: 50 INJECTION, SOLUTION INTRAMUSCULAR; INTRAVENOUS at 17:38

## 2020-06-19 RX ADMIN — INSULIN HUMAN 4 UNITS: 100 INJECTION, SOLUTION PARENTERAL at 15:42

## 2020-06-19 RX ADMIN — INSULIN GLARGINE 20 UNITS: 100 INJECTION, SOLUTION SUBCUTANEOUS at 21:12

## 2020-06-19 RX ADMIN — FENTANYL CITRATE 50 MCG: 50 INJECTION, SOLUTION INTRAMUSCULAR; INTRAVENOUS at 05:25

## 2020-06-19 RX ADMIN — CHLORHEXIDINE GLUCONATE 15 ML: 1.2 RINSE ORAL at 21:13

## 2020-06-19 RX ADMIN — SODIUM CHLORIDE, PRESERVATIVE FREE 10 ML: 5 INJECTION INTRAVENOUS at 21:00

## 2020-06-20 LAB
ALBUMIN SERPL-MCNC: 2.5 G/DL (ref 3.5–5.2)
ALBUMIN/GLOB SERPL: 0.7 G/DL
ALP SERPL-CCNC: 81 U/L (ref 39–117)
ALT SERPL W P-5'-P-CCNC: 64 U/L (ref 1–41)
ANION GAP SERPL CALCULATED.3IONS-SCNC: 9.9 MMOL/L (ref 5–15)
ARTERIAL PATENCY WRIST A: POSITIVE
AST SERPL-CCNC: 50 U/L (ref 1–40)
ATMOSPHERIC PRESS: 750.1 MMHG
BASE EXCESS BLDA CALC-SCNC: 5.8 MMOL/L (ref 0–2)
BDY SITE: ABNORMAL
BILIRUB SERPL-MCNC: 0.3 MG/DL (ref 0.2–1.2)
BUN BLD-MCNC: 8 MG/DL (ref 6–20)
BUN/CREAT SERPL: 16.3 (ref 7–25)
CALCIUM SPEC-SCNC: 8.2 MG/DL (ref 8.6–10.5)
CHLORIDE SERPL-SCNC: 100 MMOL/L (ref 98–107)
CO2 SERPL-SCNC: 24.1 MMOL/L (ref 22–29)
CREAT BLD-MCNC: 0.49 MG/DL (ref 0.76–1.27)
GFR SERPL CREATININE-BSD FRML MDRD: >150 ML/MIN/1.73
GLOBULIN UR ELPH-MCNC: 3.6 GM/DL
GLUCOSE BLD-MCNC: 224 MG/DL (ref 65–99)
GLUCOSE BLDC GLUCOMTR-MCNC: 117 MG/DL (ref 70–130)
GLUCOSE BLDC GLUCOMTR-MCNC: 141 MG/DL (ref 70–130)
GLUCOSE BLDC GLUCOMTR-MCNC: 222 MG/DL (ref 70–130)
HCO3 BLDA-SCNC: 30.6 MMOL/L (ref 22–28)
HOROWITZ INDEX BLD+IHG-RTO: 25 %
MODALITY: ABNORMAL
O2 A-A PPRESDIFF RESPIRATORY: 0.5 MMHG
PCO2 BLDA: 44.1 MM HG (ref 35–45)
PEEP RESPIRATORY: 5 CM[H2O]
PH BLDA: 7.45 PH UNITS (ref 7.35–7.45)
PHOSPHATE SERPL-MCNC: 3.3 MG/DL (ref 2.5–4.5)
PO2 BLDA: 66 MM HG (ref 80–100)
POTASSIUM BLD-SCNC: 4.2 MMOL/L (ref 3.5–5.2)
PROT SERPL-MCNC: 6.1 G/DL (ref 6–8.5)
PSV: 8 CMH2O
SAO2 % BLDCOA: 93.4 % (ref 92–99)
SODIUM BLD-SCNC: 134 MMOL/L (ref 136–145)
TOTAL RATE: 18 BREATHS/MINUTE
VENTILATOR MODE: ABNORMAL
VT ON VENT VENT: 563 ML

## 2020-06-20 PROCEDURE — 80053 COMPREHEN METABOLIC PANEL: CPT | Performed by: INTERNAL MEDICINE

## 2020-06-20 PROCEDURE — 25010000003 LEVETIRACETAM IN NACL 0.75% 1000 MG/100ML SOLUTION: Performed by: PSYCHIATRY & NEUROLOGY

## 2020-06-20 PROCEDURE — 25010000002 ENOXAPARIN PER 10 MG: Performed by: INTERNAL MEDICINE

## 2020-06-20 PROCEDURE — 94799 UNLISTED PULMONARY SVC/PX: CPT

## 2020-06-20 PROCEDURE — 36600 WITHDRAWAL OF ARTERIAL BLOOD: CPT

## 2020-06-20 PROCEDURE — 25010000002 FUROSEMIDE PER 20 MG: Performed by: INTERNAL MEDICINE

## 2020-06-20 PROCEDURE — 63710000001 INSULIN REGULAR HUMAN PER 5 UNITS: Performed by: INTERNAL MEDICINE

## 2020-06-20 PROCEDURE — 94003 VENT MGMT INPAT SUBQ DAY: CPT

## 2020-06-20 PROCEDURE — 25010000002 FENTANYL CITRATE (PF) 100 MCG/2ML SOLUTION: Performed by: INTERNAL MEDICINE

## 2020-06-20 PROCEDURE — 82962 GLUCOSE BLOOD TEST: CPT

## 2020-06-20 PROCEDURE — 99231 SBSQ HOSP IP/OBS SF/LOW 25: CPT | Performed by: PSYCHIATRY & NEUROLOGY

## 2020-06-20 PROCEDURE — 82803 BLOOD GASES ANY COMBINATION: CPT

## 2020-06-20 PROCEDURE — 84100 ASSAY OF PHOSPHORUS: CPT | Performed by: INTERNAL MEDICINE

## 2020-06-20 PROCEDURE — 63710000001 INSULIN GLARGINE PER 5 UNITS: Performed by: INTERNAL MEDICINE

## 2020-06-20 RX ORDER — FUROSEMIDE 10 MG/ML
40 INJECTION INTRAMUSCULAR; INTRAVENOUS ONCE
Status: COMPLETED | OUTPATIENT
Start: 2020-06-20 | End: 2020-06-20

## 2020-06-20 RX ADMIN — NYSTATIN: 100000 POWDER TOPICAL at 08:20

## 2020-06-20 RX ADMIN — FENTANYL CITRATE 100 MCG: 50 INJECTION, SOLUTION INTRAMUSCULAR; INTRAVENOUS at 01:04

## 2020-06-20 RX ADMIN — LEVETIRACETAM 1000 MG: 10 INJECTION INTRAVENOUS at 00:58

## 2020-06-20 RX ADMIN — LEVETIRACETAM 1000 MG: 10 INJECTION INTRAVENOUS at 11:53

## 2020-06-20 RX ADMIN — FUROSEMIDE 40 MG: 10 INJECTION, SOLUTION INTRAMUSCULAR; INTRAVENOUS at 09:46

## 2020-06-20 RX ADMIN — LEVOTHYROXINE SODIUM 150 MCG: 150 TABLET ORAL at 06:08

## 2020-06-20 RX ADMIN — CHLORHEXIDINE GLUCONATE 15 ML: 1.2 RINSE ORAL at 08:21

## 2020-06-20 RX ADMIN — INSULIN GLARGINE 20 UNITS: 100 INJECTION, SOLUTION SUBCUTANEOUS at 21:42

## 2020-06-20 RX ADMIN — SODIUM CHLORIDE, PRESERVATIVE FREE 10 ML: 5 INJECTION INTRAVENOUS at 08:20

## 2020-06-20 RX ADMIN — SODIUM CHLORIDE, PRESERVATIVE FREE 10 ML: 5 INJECTION INTRAVENOUS at 21:41

## 2020-06-20 RX ADMIN — NYSTATIN: 100000 POWDER TOPICAL at 21:40

## 2020-06-20 RX ADMIN — CHLORHEXIDINE GLUCONATE 15 ML: 1.2 RINSE ORAL at 21:42

## 2020-06-20 RX ADMIN — ENOXAPARIN SODIUM 40 MG: 40 INJECTION SUBCUTANEOUS at 16:21

## 2020-06-20 RX ADMIN — SODIUM CHLORIDE 5 MG/HR: 9 INJECTION, SOLUTION INTRAVENOUS at 04:32

## 2020-06-20 RX ADMIN — INSULIN HUMAN 8 UNITS: 100 INJECTION, SOLUTION PARENTERAL at 11:53

## 2020-06-20 RX ADMIN — INSULIN GLARGINE 20 UNITS: 100 INJECTION, SOLUTION SUBCUTANEOUS at 08:20

## 2020-06-20 RX ADMIN — SODIUM CHLORIDE 5 MG/HR: 9 INJECTION, SOLUTION INTRAVENOUS at 03:05

## 2020-06-21 LAB
ALBUMIN SERPL-MCNC: 3 G/DL (ref 3.5–5.2)
ALBUMIN/GLOB SERPL: 0.9 G/DL
ALP SERPL-CCNC: 77 U/L (ref 39–117)
ALT SERPL W P-5'-P-CCNC: 45 U/L (ref 1–41)
ANION GAP SERPL CALCULATED.3IONS-SCNC: 7.8 MMOL/L (ref 5–15)
AST SERPL-CCNC: 25 U/L (ref 1–40)
BILIRUB SERPL-MCNC: 0.4 MG/DL (ref 0.2–1.2)
BUN BLD-MCNC: 10 MG/DL (ref 6–20)
BUN/CREAT SERPL: 17.2 (ref 7–25)
CALCIUM SPEC-SCNC: 8.3 MG/DL (ref 8.6–10.5)
CHLORIDE SERPL-SCNC: 100 MMOL/L (ref 98–107)
CO2 SERPL-SCNC: 27.2 MMOL/L (ref 22–29)
CREAT BLD-MCNC: 0.58 MG/DL (ref 0.76–1.27)
GFR SERPL CREATININE-BSD FRML MDRD: 147 ML/MIN/1.73
GLOBULIN UR ELPH-MCNC: 3.3 GM/DL
GLUCOSE BLD-MCNC: 175 MG/DL (ref 65–99)
GLUCOSE BLDC GLUCOMTR-MCNC: 168 MG/DL (ref 70–130)
GLUCOSE BLDC GLUCOMTR-MCNC: 226 MG/DL (ref 70–130)
GLUCOSE BLDC GLUCOMTR-MCNC: 98 MG/DL (ref 70–130)
POTASSIUM BLD-SCNC: 3.7 MMOL/L (ref 3.5–5.2)
PROT SERPL-MCNC: 6.3 G/DL (ref 6–8.5)
SODIUM BLD-SCNC: 135 MMOL/L (ref 136–145)

## 2020-06-21 PROCEDURE — 82962 GLUCOSE BLOOD TEST: CPT

## 2020-06-21 PROCEDURE — 25010000002 ENOXAPARIN PER 10 MG: Performed by: INTERNAL MEDICINE

## 2020-06-21 PROCEDURE — 99231 SBSQ HOSP IP/OBS SF/LOW 25: CPT | Performed by: PSYCHIATRY & NEUROLOGY

## 2020-06-21 PROCEDURE — 63710000001 INSULIN GLARGINE PER 5 UNITS: Performed by: INTERNAL MEDICINE

## 2020-06-21 PROCEDURE — 25010000003 LEVETIRACETAM IN NACL 0.75% 1000 MG/100ML SOLUTION: Performed by: PSYCHIATRY & NEUROLOGY

## 2020-06-21 PROCEDURE — 63710000001 INSULIN REGULAR HUMAN PER 5 UNITS: Performed by: INTERNAL MEDICINE

## 2020-06-21 PROCEDURE — 80053 COMPREHEN METABOLIC PANEL: CPT | Performed by: INTERNAL MEDICINE

## 2020-06-21 RX ADMIN — ENOXAPARIN SODIUM 40 MG: 40 INJECTION SUBCUTANEOUS at 14:08

## 2020-06-21 RX ADMIN — CHLORHEXIDINE GLUCONATE 15 ML: 1.2 RINSE ORAL at 08:41

## 2020-06-21 RX ADMIN — INSULIN GLARGINE 20 UNITS: 100 INJECTION, SOLUTION SUBCUTANEOUS at 08:44

## 2020-06-21 RX ADMIN — SODIUM CHLORIDE, PRESERVATIVE FREE 10 ML: 5 INJECTION INTRAVENOUS at 08:42

## 2020-06-21 RX ADMIN — SODIUM CHLORIDE, PRESERVATIVE FREE 10 ML: 5 INJECTION INTRAVENOUS at 21:21

## 2020-06-21 RX ADMIN — LEVOTHYROXINE SODIUM 150 MCG: 150 TABLET ORAL at 05:08

## 2020-06-21 RX ADMIN — NYSTATIN: 100000 POWDER TOPICAL at 21:20

## 2020-06-21 RX ADMIN — LEVETIRACETAM 1000 MG: 10 INJECTION INTRAVENOUS at 00:52

## 2020-06-21 RX ADMIN — LEVETIRACETAM 1000 MG: 10 INJECTION INTRAVENOUS at 13:58

## 2020-06-21 RX ADMIN — NYSTATIN: 100000 POWDER TOPICAL at 08:41

## 2020-06-21 RX ADMIN — CHLORHEXIDINE GLUCONATE 15 ML: 1.2 RINSE ORAL at 21:21

## 2020-06-21 RX ADMIN — INSULIN HUMAN 8 UNITS: 100 INJECTION, SOLUTION PARENTERAL at 13:58

## 2020-06-22 ENCOUNTER — APPOINTMENT (OUTPATIENT)
Dept: MRI IMAGING | Facility: HOSPITAL | Age: 55
End: 2020-06-22

## 2020-06-22 ENCOUNTER — APPOINTMENT (OUTPATIENT)
Dept: NEUROLOGY | Facility: HOSPITAL | Age: 55
End: 2020-06-22

## 2020-06-22 LAB
ALBUMIN SERPL-MCNC: 3 G/DL (ref 3.5–5.2)
ALBUMIN/GLOB SERPL: 0.9 G/DL
ALP SERPL-CCNC: 71 U/L (ref 39–117)
ALT SERPL W P-5'-P-CCNC: 34 U/L (ref 1–41)
ANION GAP SERPL CALCULATED.3IONS-SCNC: 10.3 MMOL/L (ref 5–15)
AST SERPL-CCNC: 24 U/L (ref 1–40)
BILIRUB SERPL-MCNC: 0.3 MG/DL (ref 0.2–1.2)
BUN BLD-MCNC: 10 MG/DL (ref 6–20)
BUN/CREAT SERPL: 17.2 (ref 7–25)
CALCIUM SPEC-SCNC: 8.3 MG/DL (ref 8.6–10.5)
CHLORIDE SERPL-SCNC: 97 MMOL/L (ref 98–107)
CO2 SERPL-SCNC: 24.7 MMOL/L (ref 22–29)
CREAT BLD-MCNC: 0.58 MG/DL (ref 0.76–1.27)
GFR SERPL CREATININE-BSD FRML MDRD: 147 ML/MIN/1.73
GLOBULIN UR ELPH-MCNC: 3.5 GM/DL
GLUCOSE BLD-MCNC: 266 MG/DL (ref 65–99)
GLUCOSE BLDC GLUCOMTR-MCNC: 129 MG/DL (ref 70–130)
GLUCOSE BLDC GLUCOMTR-MCNC: 211 MG/DL (ref 70–130)
GLUCOSE BLDC GLUCOMTR-MCNC: 236 MG/DL (ref 70–130)
GLUCOSE BLDC GLUCOMTR-MCNC: 246 MG/DL (ref 70–130)
GLUCOSE BLDC GLUCOMTR-MCNC: 87 MG/DL (ref 70–130)
POTASSIUM BLD-SCNC: 3.8 MMOL/L (ref 3.5–5.2)
PROT SERPL-MCNC: 6.5 G/DL (ref 6–8.5)
SODIUM BLD-SCNC: 132 MMOL/L (ref 136–145)

## 2020-06-22 PROCEDURE — 25010000003 LEVETIRACETAM IN NACL 0.75% 1000 MG/100ML SOLUTION: Performed by: PSYCHIATRY & NEUROLOGY

## 2020-06-22 PROCEDURE — 009U3ZX DRAINAGE OF SPINAL CANAL, PERCUTANEOUS APPROACH, DIAGNOSTIC: ICD-10-PCS | Performed by: PSYCHIATRY & NEUROLOGY

## 2020-06-22 PROCEDURE — 95816 EEG AWAKE AND DROWSY: CPT

## 2020-06-22 PROCEDURE — 63710000001 INSULIN REGULAR HUMAN PER 5 UNITS: Performed by: INTERNAL MEDICINE

## 2020-06-22 PROCEDURE — 99232 SBSQ HOSP IP/OBS MODERATE 35: CPT | Performed by: PSYCHIATRY & NEUROLOGY

## 2020-06-22 PROCEDURE — 63710000001 INSULIN GLARGINE PER 5 UNITS: Performed by: INTERNAL MEDICINE

## 2020-06-22 PROCEDURE — 95816 EEG AWAKE AND DROWSY: CPT | Performed by: PSYCHIATRY & NEUROLOGY

## 2020-06-22 PROCEDURE — 82962 GLUCOSE BLOOD TEST: CPT

## 2020-06-22 PROCEDURE — 70551 MRI BRAIN STEM W/O DYE: CPT

## 2020-06-22 PROCEDURE — 80053 COMPREHEN METABOLIC PANEL: CPT | Performed by: INTERNAL MEDICINE

## 2020-06-22 RX ORDER — RISPERIDONE 0.5 MG/1
0.5 TABLET ORAL NIGHTLY
Status: DISCONTINUED | OUTPATIENT
Start: 2020-06-22 | End: 2020-06-27

## 2020-06-22 RX ORDER — DEXMEDETOMIDINE HYDROCHLORIDE 4 UG/ML
.2-1.5 INJECTION INTRAVENOUS
Status: DISCONTINUED | OUTPATIENT
Start: 2020-06-22 | End: 2020-06-25

## 2020-06-22 RX ADMIN — SODIUM CHLORIDE 200 MG: 9 INJECTION, SOLUTION INTRAVENOUS at 12:48

## 2020-06-22 RX ADMIN — SODIUM CHLORIDE, PRESERVATIVE FREE 10 ML: 5 INJECTION INTRAVENOUS at 20:42

## 2020-06-22 RX ADMIN — LEVOTHYROXINE SODIUM 150 MCG: 150 TABLET ORAL at 05:32

## 2020-06-22 RX ADMIN — INSULIN GLARGINE 20 UNITS: 100 INJECTION, SOLUTION SUBCUTANEOUS at 22:03

## 2020-06-22 RX ADMIN — NYSTATIN: 100000 POWDER TOPICAL at 20:42

## 2020-06-22 RX ADMIN — INSULIN GLARGINE 20 UNITS: 100 INJECTION, SOLUTION SUBCUTANEOUS at 09:18

## 2020-06-22 RX ADMIN — CHLORHEXIDINE GLUCONATE 15 ML: 1.2 RINSE ORAL at 20:42

## 2020-06-22 RX ADMIN — INSULIN HUMAN 8 UNITS: 100 INJECTION, SOLUTION PARENTERAL at 11:50

## 2020-06-22 RX ADMIN — LEVETIRACETAM 1000 MG: 10 INJECTION INTRAVENOUS at 12:45

## 2020-06-22 RX ADMIN — CHLORHEXIDINE GLUCONATE 15 ML: 1.2 RINSE ORAL at 09:14

## 2020-06-22 RX ADMIN — INSULIN HUMAN 8 UNITS: 100 INJECTION, SOLUTION PARENTERAL at 05:32

## 2020-06-22 RX ADMIN — SODIUM CHLORIDE 100 MG: 9 INJECTION, SOLUTION INTRAVENOUS at 20:40

## 2020-06-22 RX ADMIN — DEXMEDETOMIDINE HYDROCHLORIDE 0.4 MCG/KG/HR: 4 INJECTION INTRAVENOUS at 11:43

## 2020-06-22 RX ADMIN — RISPERIDONE 0.5 MG: 0.5 TABLET, FILM COATED ORAL at 21:03

## 2020-06-22 RX ADMIN — DEXMEDETOMIDINE HYDROCHLORIDE 0.6 MCG/KG/HR: 4 INJECTION INTRAVENOUS at 13:16

## 2020-06-22 RX ADMIN — NYSTATIN: 100000 POWDER TOPICAL at 09:14

## 2020-06-22 RX ADMIN — SODIUM CHLORIDE, PRESERVATIVE FREE 10 ML: 5 INJECTION INTRAVENOUS at 09:15

## 2020-06-22 RX ADMIN — DEXMEDETOMIDINE HYDROCHLORIDE 0.2 MCG/KG/HR: 4 INJECTION INTRAVENOUS at 19:40

## 2020-06-22 RX ADMIN — LEVETIRACETAM 1000 MG: 10 INJECTION INTRAVENOUS at 00:00

## 2020-06-22 RX ADMIN — INSULIN HUMAN 8 UNITS: 100 INJECTION, SOLUTION PARENTERAL at 18:03

## 2020-06-23 ENCOUNTER — APPOINTMENT (OUTPATIENT)
Dept: GENERAL RADIOLOGY | Facility: HOSPITAL | Age: 55
End: 2020-06-23

## 2020-06-23 LAB
ALBUMIN SERPL-MCNC: 3.2 G/DL (ref 3.5–5.2)
ALBUMIN/GLOB SERPL: 0.9 G/DL
ALP SERPL-CCNC: 68 U/L (ref 39–117)
ALT SERPL W P-5'-P-CCNC: 28 U/L (ref 1–41)
ANION GAP SERPL CALCULATED.3IONS-SCNC: 8.4 MMOL/L (ref 5–15)
APPEARANCE CSF: ABNORMAL
APPEARANCE CSF: ABNORMAL
AST SERPL-CCNC: 23 U/L (ref 1–40)
B BURGDOR IGG PATRN SER IB-IMP: NEGATIVE
B BURGDOR IGM PATRN SER IB-IMP: NEGATIVE
BILIRUB SERPL-MCNC: 0.3 MG/DL (ref 0.2–1.2)
BUN BLD-MCNC: 13 MG/DL (ref 6–20)
BUN/CREAT SERPL: 19.4 (ref 7–25)
CALCIUM SPEC-SCNC: 8.2 MG/DL (ref 8.6–10.5)
CHLORIDE SERPL-SCNC: 99 MMOL/L (ref 98–107)
CO2 SERPL-SCNC: 26.6 MMOL/L (ref 22–29)
COLOR CSF: ABNORMAL
COLOR CSF: ABNORMAL
COLOR SPUN CSF: COLORLESS
COLOR SPUN CSF: COLORLESS
CREAT BLD-MCNC: 0.67 MG/DL (ref 0.76–1.27)
DEPRECATED RDW RBC AUTO: 48.1 FL (ref 37–54)
EOSINOPHIL NFR CSF MANUAL: 2 %
EOSINOPHIL NFR CSF MANUAL: 4 %
ERYTHROCYTE [DISTWIDTH] IN BLOOD BY AUTOMATED COUNT: 14.9 % (ref 12.3–15.4)
GFR SERPL CREATININE-BSD FRML MDRD: 124 ML/MIN/1.73
GLOBULIN UR ELPH-MCNC: 3.6 GM/DL
GLUCOSE BLD-MCNC: 205 MG/DL (ref 65–99)
GLUCOSE BLDC GLUCOMTR-MCNC: 115 MG/DL (ref 70–130)
GLUCOSE BLDC GLUCOMTR-MCNC: 196 MG/DL (ref 70–130)
GLUCOSE BLDC GLUCOMTR-MCNC: 259 MG/DL (ref 70–130)
GLUCOSE BLDC GLUCOMTR-MCNC: 303 MG/DL (ref 70–130)
GLUCOSE CSF-MCNC: 152 MG/DL (ref 40–70)
HCT VFR BLD AUTO: 40.2 % (ref 37.5–51)
HGB BLD-MCNC: 13 G/DL (ref 13–17.7)
LYMPHOCYTES NFR CSF MANUAL: 24 %
LYMPHOCYTES NFR CSF MANUAL: 47 %
MCH RBC QN AUTO: 28.4 PG (ref 26.6–33)
MCHC RBC AUTO-ENTMCNC: 32.3 G/DL (ref 31.5–35.7)
MCV RBC AUTO: 87.8 FL (ref 79–97)
METHOD: ABNORMAL
METHOD: ABNORMAL
MONOCYTES NFR CSF MANUAL: 4 %
NEUTROPHILS NFR CSF MICRO: 45 %
NEUTROPHILS NFR CSF MICRO: 74 %
NUC CELL # CSF MANUAL: 14 /MM3 (ref 0–5)
NUC CELL # CSF MANUAL: 24 /MM3 (ref 0–5)
P18 AB. IGG: NORMAL
P23 AB. IGG CSF: NORMAL
P23 AB. IGM CSF: NORMAL
P28 AB. IGG CSF: NORMAL
P30 AB. IGG CSF: NORMAL
P39 AB. IGG CSF: NORMAL
P39 AB. IGM CSF: NORMAL
P41 AB. IGG CSF: NORMAL
P41 AB. IGM CSF: NORMAL
P45 AB. IGG CSF: NORMAL
P58 AB. IGG CSF: NORMAL
P66 AB. IGG CSF: NORMAL
P93 AB. IGG CSF: NORMAL
PLATELET # BLD AUTO: 378 10*3/MM3 (ref 140–450)
PMV BLD AUTO: 11.1 FL (ref 6–12)
POTASSIUM BLD-SCNC: 3.7 MMOL/L (ref 3.5–5.2)
PROT CSF-MCNC: 98 MG/DL (ref 15–45)
PROT SERPL-MCNC: 6.8 G/DL (ref 6–8.5)
RBC # BLD AUTO: 4.58 10*6/MM3 (ref 4.14–5.8)
RBC # CSF MANUAL: 4850 /MM3 (ref 0–0)
RBC # CSF MANUAL: 7400 /MM3 (ref 0–0)
SODIUM BLD-SCNC: 134 MMOL/L (ref 136–145)
TUBE # CSF: 1
TUBE # CSF: 4
WBC NRBC COR # BLD: 10.78 10*3/MM3 (ref 3.4–10.8)

## 2020-06-23 PROCEDURE — 82042 OTHER SOURCE ALBUMIN QUAN EA: CPT | Performed by: PSYCHIATRY & NEUROLOGY

## 2020-06-23 PROCEDURE — 82962 GLUCOSE BLOOD TEST: CPT

## 2020-06-23 PROCEDURE — 85027 COMPLETE CBC AUTOMATED: CPT | Performed by: INTERNAL MEDICINE

## 2020-06-23 PROCEDURE — 80053 COMPREHEN METABOLIC PANEL: CPT | Performed by: INTERNAL MEDICINE

## 2020-06-23 PROCEDURE — 63710000001 INSULIN REGULAR HUMAN PER 5 UNITS: Performed by: INTERNAL MEDICINE

## 2020-06-23 PROCEDURE — 82945 GLUCOSE OTHER FLUID: CPT | Performed by: PSYCHIATRY & NEUROLOGY

## 2020-06-23 PROCEDURE — 82784 ASSAY IGA/IGD/IGG/IGM EACH: CPT | Performed by: PSYCHIATRY & NEUROLOGY

## 2020-06-23 PROCEDURE — 84182 PROTEIN WESTERN BLOT TEST: CPT | Performed by: PSYCHIATRY & NEUROLOGY

## 2020-06-23 PROCEDURE — 82040 ASSAY OF SERUM ALBUMIN: CPT | Performed by: PSYCHIATRY & NEUROLOGY

## 2020-06-23 PROCEDURE — 25010000003 LIDOCAINE 1 % SOLUTION: Performed by: RADIOLOGY

## 2020-06-23 PROCEDURE — 86788 WEST NILE VIRUS AB IGM: CPT | Performed by: PSYCHIATRY & NEUROLOGY

## 2020-06-23 PROCEDURE — 83916 OLIGOCLONAL BANDS: CPT | Performed by: PSYCHIATRY & NEUROLOGY

## 2020-06-23 PROCEDURE — 86317 IMMUNOASSAY INFECTIOUS AGENT: CPT | Performed by: PSYCHIATRY & NEUROLOGY

## 2020-06-23 PROCEDURE — 99232 SBSQ HOSP IP/OBS MODERATE 35: CPT | Performed by: PSYCHIATRY & NEUROLOGY

## 2020-06-23 PROCEDURE — 63710000001 INSULIN GLARGINE PER 5 UNITS: Performed by: INTERNAL MEDICINE

## 2020-06-23 PROCEDURE — 84157 ASSAY OF PROTEIN OTHER: CPT | Performed by: PSYCHIATRY & NEUROLOGY

## 2020-06-23 PROCEDURE — 89051 BODY FLUID CELL COUNT: CPT | Performed by: PSYCHIATRY & NEUROLOGY

## 2020-06-23 PROCEDURE — 86789 WEST NILE VIRUS ANTIBODY: CPT | Performed by: PSYCHIATRY & NEUROLOGY

## 2020-06-23 PROCEDURE — 71045 X-RAY EXAM CHEST 1 VIEW: CPT

## 2020-06-23 RX ORDER — LIDOCAINE HYDROCHLORIDE 10 MG/ML
10 INJECTION, SOLUTION INFILTRATION; PERINEURAL ONCE
Status: COMPLETED | OUTPATIENT
Start: 2020-06-23 | End: 2020-06-23

## 2020-06-23 RX ADMIN — SODIUM CHLORIDE, PRESERVATIVE FREE 10 ML: 5 INJECTION INTRAVENOUS at 08:35

## 2020-06-23 RX ADMIN — INSULIN HUMAN 4 UNITS: 100 INJECTION, SOLUTION PARENTERAL at 05:26

## 2020-06-23 RX ADMIN — RISPERIDONE 0.5 MG: 0.5 TABLET, FILM COATED ORAL at 20:03

## 2020-06-23 RX ADMIN — SODIUM CHLORIDE 100 MG: 9 INJECTION, SOLUTION INTRAVENOUS at 20:04

## 2020-06-23 RX ADMIN — SODIUM CHLORIDE, PRESERVATIVE FREE 10 ML: 5 INJECTION INTRAVENOUS at 20:06

## 2020-06-23 RX ADMIN — SODIUM CHLORIDE 100 MG: 9 INJECTION, SOLUTION INTRAVENOUS at 08:46

## 2020-06-23 RX ADMIN — LIDOCAINE HYDROCHLORIDE 8 ML: 10 INJECTION, SOLUTION INFILTRATION; PERINEURAL at 15:00

## 2020-06-23 RX ADMIN — LEVOTHYROXINE SODIUM 150 MCG: 150 TABLET ORAL at 05:26

## 2020-06-23 RX ADMIN — NYSTATIN: 100000 POWDER TOPICAL at 08:35

## 2020-06-23 RX ADMIN — INSULIN GLARGINE 20 UNITS: 100 INJECTION, SOLUTION SUBCUTANEOUS at 20:02

## 2020-06-23 RX ADMIN — CHLORHEXIDINE GLUCONATE 15 ML: 1.2 RINSE ORAL at 08:35

## 2020-06-23 RX ADMIN — CHLORHEXIDINE GLUCONATE 15 ML: 1.2 RINSE ORAL at 20:03

## 2020-06-23 RX ADMIN — NYSTATIN: 100000 POWDER TOPICAL at 20:03

## 2020-06-23 RX ADMIN — INSULIN GLARGINE 20 UNITS: 100 INJECTION, SOLUTION SUBCUTANEOUS at 08:35

## 2020-06-23 RX ADMIN — INSULIN HUMAN 16 UNITS: 100 INJECTION, SOLUTION PARENTERAL at 18:22

## 2020-06-24 LAB
ALBUMIN SERPL-MCNC: 2.9 G/DL (ref 3.5–5.2)
ALBUMIN/GLOB SERPL: 0.7 G/DL
ALP SERPL-CCNC: 62 U/L (ref 39–117)
ALT SERPL W P-5'-P-CCNC: 21 U/L (ref 1–41)
ANION GAP SERPL CALCULATED.3IONS-SCNC: 14.1 MMOL/L (ref 5–15)
AST SERPL-CCNC: 23 U/L (ref 1–40)
BILIRUB SERPL-MCNC: 0.3 MG/DL (ref 0.2–1.2)
BUN BLD-MCNC: 13 MG/DL (ref 6–20)
BUN/CREAT SERPL: 18.8 (ref 7–25)
CALCIUM SPEC-SCNC: 8.3 MG/DL (ref 8.6–10.5)
CHLORIDE SERPL-SCNC: 97 MMOL/L (ref 98–107)
CO2 SERPL-SCNC: 18.9 MMOL/L (ref 22–29)
CREAT BLD-MCNC: 0.69 MG/DL (ref 0.76–1.27)
GFR SERPL CREATININE-BSD FRML MDRD: 120 ML/MIN/1.73
GLOBULIN UR ELPH-MCNC: 4 GM/DL
GLUCOSE BLD-MCNC: 212 MG/DL (ref 65–99)
GLUCOSE BLDC GLUCOMTR-MCNC: 195 MG/DL (ref 70–130)
GLUCOSE BLDC GLUCOMTR-MCNC: 200 MG/DL (ref 70–130)
GLUCOSE BLDC GLUCOMTR-MCNC: 254 MG/DL (ref 70–130)
HOLD SPECIMEN: NORMAL
POTASSIUM BLD-SCNC: 4.9 MMOL/L (ref 3.5–5.2)
PROT SERPL-MCNC: 6.9 G/DL (ref 6–8.5)
SODIUM BLD-SCNC: 130 MMOL/L (ref 136–145)

## 2020-06-24 PROCEDURE — 86255 FLUORESCENT ANTIBODY SCREEN: CPT | Performed by: INTERNAL MEDICINE

## 2020-06-24 PROCEDURE — 25010000002 IMMUNE GLOBULIN (HUMAN) 40 GM/400ML SOLUTION: Performed by: PSYCHIATRY & NEUROLOGY

## 2020-06-24 PROCEDURE — 83519 RIA NONANTIBODY: CPT | Performed by: INTERNAL MEDICINE

## 2020-06-24 PROCEDURE — 63710000001 INSULIN GLARGINE PER 5 UNITS: Performed by: INTERNAL MEDICINE

## 2020-06-24 PROCEDURE — 25010000002 ENOXAPARIN PER 10 MG: Performed by: INTERNAL MEDICINE

## 2020-06-24 PROCEDURE — 99232 SBSQ HOSP IP/OBS MODERATE 35: CPT | Performed by: PSYCHIATRY & NEUROLOGY

## 2020-06-24 PROCEDURE — 80053 COMPREHEN METABOLIC PANEL: CPT | Performed by: INTERNAL MEDICINE

## 2020-06-24 PROCEDURE — 86341 ISLET CELL ANTIBODY: CPT | Performed by: INTERNAL MEDICINE

## 2020-06-24 PROCEDURE — 82962 GLUCOSE BLOOD TEST: CPT

## 2020-06-24 PROCEDURE — 63710000001 INSULIN REGULAR HUMAN PER 5 UNITS: Performed by: INTERNAL MEDICINE

## 2020-06-24 RX ORDER — DIPHENHYDRAMINE HYDROCHLORIDE 50 MG/ML
50 INJECTION INTRAMUSCULAR; INTRAVENOUS ONCE AS NEEDED
Status: DISCONTINUED | OUTPATIENT
Start: 2020-06-24 | End: 2020-07-03 | Stop reason: HOSPADM

## 2020-06-24 RX ORDER — ACETAMINOPHEN 160 MG/5ML
650 SOLUTION ORAL DAILY
Status: COMPLETED | OUTPATIENT
Start: 2020-06-25 | End: 2020-06-28

## 2020-06-24 RX ORDER — DIPHENHYDRAMINE HCL 12.5MG/5ML
25 LIQUID (ML) ORAL DAILY
Status: DISCONTINUED | OUTPATIENT
Start: 2020-06-25 | End: 2020-06-25

## 2020-06-24 RX ORDER — DIPHENHYDRAMINE HCL 12.5MG/5ML
25 LIQUID (ML) ORAL ONCE
Status: COMPLETED | OUTPATIENT
Start: 2020-06-24 | End: 2020-06-24

## 2020-06-24 RX ORDER — ACETAMINOPHEN 160 MG/5ML
650 SOLUTION ORAL ONCE
Status: COMPLETED | OUTPATIENT
Start: 2020-06-24 | End: 2020-06-24

## 2020-06-24 RX ORDER — SODIUM CHLORIDE 9 MG/ML
50 INJECTION, SOLUTION INTRAVENOUS CONTINUOUS
Status: ACTIVE | OUTPATIENT
Start: 2020-06-24 | End: 2020-06-25

## 2020-06-24 RX ORDER — FAMOTIDINE 10 MG/ML
20 INJECTION, SOLUTION INTRAVENOUS ONCE AS NEEDED
Status: DISCONTINUED | OUTPATIENT
Start: 2020-06-24 | End: 2020-07-03 | Stop reason: HOSPADM

## 2020-06-24 RX ADMIN — LEVOTHYROXINE SODIUM 150 MCG: 150 TABLET ORAL at 05:43

## 2020-06-24 RX ADMIN — ACETAMINOPHEN ORAL SOLUTION 650 MG: 325 SOLUTION ORAL at 09:37

## 2020-06-24 RX ADMIN — SODIUM CHLORIDE 50 ML/HR: 9 INJECTION, SOLUTION INTRAVENOUS at 18:16

## 2020-06-24 RX ADMIN — NYSTATIN: 100000 POWDER TOPICAL at 20:36

## 2020-06-24 RX ADMIN — IMMUNE GLOBULIN (HUMAN) 40 G: 10 INJECTION INTRAVENOUS; SUBCUTANEOUS at 10:24

## 2020-06-24 RX ADMIN — SODIUM CHLORIDE 100 MG: 9 INJECTION, SOLUTION INTRAVENOUS at 20:35

## 2020-06-24 RX ADMIN — DIPHENHYDRAMINE HYDROCHLORIDE 25 MG: 25 SOLUTION ORAL at 09:37

## 2020-06-24 RX ADMIN — INSULIN HUMAN 12 UNITS: 100 INJECTION, SOLUTION PARENTERAL at 14:07

## 2020-06-24 RX ADMIN — INSULIN HUMAN 8 UNITS: 100 INJECTION, SOLUTION PARENTERAL at 18:16

## 2020-06-24 RX ADMIN — RISPERIDONE 0.5 MG: 0.5 TABLET, FILM COATED ORAL at 20:36

## 2020-06-24 RX ADMIN — NYSTATIN: 100000 POWDER TOPICAL at 08:29

## 2020-06-24 RX ADMIN — INSULIN GLARGINE 20 UNITS: 100 INJECTION, SOLUTION SUBCUTANEOUS at 08:29

## 2020-06-24 RX ADMIN — SODIUM CHLORIDE, PRESERVATIVE FREE 10 ML: 5 INJECTION INTRAVENOUS at 20:36

## 2020-06-24 RX ADMIN — SODIUM CHLORIDE, PRESERVATIVE FREE 10 ML: 5 INJECTION INTRAVENOUS at 08:29

## 2020-06-24 RX ADMIN — INSULIN GLARGINE 20 UNITS: 100 INJECTION, SOLUTION SUBCUTANEOUS at 20:36

## 2020-06-24 RX ADMIN — ENOXAPARIN SODIUM 40 MG: 40 INJECTION SUBCUTANEOUS at 14:07

## 2020-06-24 RX ADMIN — SODIUM CHLORIDE 100 MG: 9 INJECTION, SOLUTION INTRAVENOUS at 08:29

## 2020-06-24 RX ADMIN — INSULIN HUMAN 4 UNITS: 100 INJECTION, SOLUTION PARENTERAL at 05:43

## 2020-06-25 LAB
ALBUMIN SERPL-MCNC: 3.3 G/DL (ref 3.5–5.2)
ALBUMIN/GLOB SERPL: 0.7 G/DL
ALP SERPL-CCNC: 63 U/L (ref 39–117)
ALT SERPL W P-5'-P-CCNC: 21 U/L (ref 1–41)
ANION GAP SERPL CALCULATED.3IONS-SCNC: 10.5 MMOL/L (ref 5–15)
AST SERPL-CCNC: 21 U/L (ref 1–40)
BILIRUB SERPL-MCNC: 0.4 MG/DL (ref 0.2–1.2)
BUN BLD-MCNC: 14 MG/DL (ref 6–20)
BUN/CREAT SERPL: 20.9 (ref 7–25)
CALCIUM SPEC-SCNC: 8.7 MG/DL (ref 8.6–10.5)
CHLORIDE SERPL-SCNC: 99 MMOL/L (ref 98–107)
CO2 SERPL-SCNC: 22.5 MMOL/L (ref 22–29)
CREAT BLD-MCNC: 0.67 MG/DL (ref 0.76–1.27)
GFR SERPL CREATININE-BSD FRML MDRD: 124 ML/MIN/1.73
GLOBULIN UR ELPH-MCNC: 4.7 GM/DL
GLUCOSE BLD-MCNC: 303 MG/DL (ref 65–99)
GLUCOSE BLDC GLUCOMTR-MCNC: 155 MG/DL (ref 70–130)
GLUCOSE BLDC GLUCOMTR-MCNC: 262 MG/DL (ref 70–130)
GLUCOSE BLDC GLUCOMTR-MCNC: 280 MG/DL (ref 70–130)
GLUCOSE BLDC GLUCOMTR-MCNC: 90 MG/DL (ref 70–130)
POTASSIUM BLD-SCNC: 4.5 MMOL/L (ref 3.5–5.2)
PROT SERPL-MCNC: 8 G/DL (ref 6–8.5)
SODIUM BLD-SCNC: 132 MMOL/L (ref 136–145)
TSH SERPL DL<=0.05 MIU/L-ACNC: 59 UIU/ML (ref 0.27–4.2)
WNV IGG SPEC QL IA: POSITIVE
WNV IGM CSF QL IA: NEGATIVE

## 2020-06-25 PROCEDURE — 63710000001 INSULIN REGULAR HUMAN PER 5 UNITS: Performed by: INTERNAL MEDICINE

## 2020-06-25 PROCEDURE — 25010000002 FENTANYL CITRATE (PF) 100 MCG/2ML SOLUTION: Performed by: INTERNAL MEDICINE

## 2020-06-25 PROCEDURE — 84443 ASSAY THYROID STIM HORMONE: CPT | Performed by: INTERNAL MEDICINE

## 2020-06-25 PROCEDURE — 82962 GLUCOSE BLOOD TEST: CPT

## 2020-06-25 PROCEDURE — 99232 SBSQ HOSP IP/OBS MODERATE 35: CPT | Performed by: PSYCHIATRY & NEUROLOGY

## 2020-06-25 PROCEDURE — 80053 COMPREHEN METABOLIC PANEL: CPT | Performed by: INTERNAL MEDICINE

## 2020-06-25 PROCEDURE — 63710000001 INSULIN GLARGINE PER 5 UNITS: Performed by: INTERNAL MEDICINE

## 2020-06-25 PROCEDURE — 25010000002 IMMUNE GLOBULIN (HUMAN) 40 GM/400ML SOLUTION: Performed by: PSYCHIATRY & NEUROLOGY

## 2020-06-25 PROCEDURE — 25010000002 ENOXAPARIN PER 10 MG: Performed by: INTERNAL MEDICINE

## 2020-06-25 PROCEDURE — 99254 IP/OBS CNSLTJ NEW/EST MOD 60: CPT | Performed by: INTERNAL MEDICINE

## 2020-06-25 RX ORDER — BRIMONIDINE TARTRATE 0.15 %
1 DROPS OPHTHALMIC (EYE) 2 TIMES DAILY
Status: DISCONTINUED | OUTPATIENT
Start: 2020-06-25 | End: 2020-07-03 | Stop reason: HOSPADM

## 2020-06-25 RX ORDER — GLIMEPIRIDE 2 MG/1
1 TABLET ORAL EVERY 12 HOURS SCHEDULED
Status: DISCONTINUED | OUTPATIENT
Start: 2020-06-25 | End: 2020-07-03 | Stop reason: HOSPADM

## 2020-06-25 RX ORDER — ATORVASTATIN CALCIUM 20 MG/1
10 TABLET, FILM COATED ORAL DAILY
Status: DISCONTINUED | OUTPATIENT
Start: 2020-06-25 | End: 2020-07-03 | Stop reason: HOSPADM

## 2020-06-25 RX ORDER — LATANOPROST 50 UG/ML
1 SOLUTION/ DROPS OPHTHALMIC NIGHTLY
Status: DISCONTINUED | OUTPATIENT
Start: 2020-06-25 | End: 2020-07-03 | Stop reason: HOSPADM

## 2020-06-25 RX ORDER — INSULIN GLARGINE 100 [IU]/ML
25 INJECTION, SOLUTION SUBCUTANEOUS EVERY 12 HOURS SCHEDULED
Status: DISCONTINUED | OUTPATIENT
Start: 2020-06-25 | End: 2020-06-28

## 2020-06-25 RX ADMIN — ATORVASTATIN CALCIUM 10 MG: 20 TABLET, FILM COATED ORAL at 17:05

## 2020-06-25 RX ADMIN — IMMUNE GLOBULIN (HUMAN) 40 G: 10 INJECTION INTRAVENOUS; SUBCUTANEOUS at 10:00

## 2020-06-25 RX ADMIN — FENTANYL CITRATE 100 MCG: 50 INJECTION, SOLUTION INTRAMUSCULAR; INTRAVENOUS at 02:38

## 2020-06-25 RX ADMIN — DIPHENHYDRAMINE HYDROCHLORIDE 25 MG: 25 SOLUTION ORAL at 08:39

## 2020-06-25 RX ADMIN — INSULIN HUMAN 12 UNITS: 100 INJECTION, SOLUTION PARENTERAL at 05:31

## 2020-06-25 RX ADMIN — TIMOLOL MALEATE 1 DROP: 2.5 SOLUTION/ DROPS OPHTHALMIC at 20:39

## 2020-06-25 RX ADMIN — TIMOLOL MALEATE 1 DROP: 2.5 SOLUTION/ DROPS OPHTHALMIC at 17:05

## 2020-06-25 RX ADMIN — BRIMONIDINE TARTRATE: 1.5 SOLUTION OPHTHALMIC at 17:06

## 2020-06-25 RX ADMIN — INSULIN GLARGINE 20 UNITS: 100 INJECTION, SOLUTION SUBCUTANEOUS at 08:40

## 2020-06-25 RX ADMIN — INSULIN HUMAN 12 UNITS: 100 INJECTION, SOLUTION PARENTERAL at 12:18

## 2020-06-25 RX ADMIN — SODIUM CHLORIDE 100 MG: 9 INJECTION, SOLUTION INTRAVENOUS at 20:39

## 2020-06-25 RX ADMIN — BRIMONIDINE TARTRATE 1 DROP: 1.5 SOLUTION OPHTHALMIC at 20:39

## 2020-06-25 RX ADMIN — ACETAMINOPHEN ORAL SOLUTION 650 MG: 325 SOLUTION ORAL at 08:39

## 2020-06-25 RX ADMIN — INSULIN HUMAN 2 UNITS: 100 INJECTION, SOLUTION PARENTERAL at 19:45

## 2020-06-25 RX ADMIN — RISPERIDONE 0.5 MG: 0.5 TABLET, FILM COATED ORAL at 20:39

## 2020-06-25 RX ADMIN — NYSTATIN: 100000 POWDER TOPICAL at 20:38

## 2020-06-25 RX ADMIN — NYSTATIN: 100000 POWDER TOPICAL at 12:24

## 2020-06-25 RX ADMIN — ENOXAPARIN SODIUM 40 MG: 40 INJECTION SUBCUTANEOUS at 12:18

## 2020-06-25 RX ADMIN — SODIUM CHLORIDE, PRESERVATIVE FREE 10 ML: 5 INJECTION INTRAVENOUS at 20:39

## 2020-06-25 RX ADMIN — INSULIN GLARGINE 25 UNITS: 100 INJECTION, SOLUTION SUBCUTANEOUS at 20:55

## 2020-06-25 RX ADMIN — SODIUM CHLORIDE, PRESERVATIVE FREE 10 ML: 5 INJECTION INTRAVENOUS at 08:40

## 2020-06-25 RX ADMIN — SODIUM CHLORIDE 100 MG: 9 INJECTION, SOLUTION INTRAVENOUS at 09:22

## 2020-06-25 RX ADMIN — LEVOTHYROXINE SODIUM 150 MCG: 150 TABLET ORAL at 05:31

## 2020-06-26 LAB
ALB CSF/SERPL: 38 {RATIO} (ref 0–8)
ALBUMIN CSF-MCNC: 118 MG/DL (ref 11–48)
ALBUMIN SERPL-MCNC: 3 G/DL (ref 3.5–5.2)
ALBUMIN SERPL-MCNC: 3.1 G/DL (ref 3.8–4.9)
ALBUMIN/GLOB SERPL: 0.6 G/DL
ALP SERPL-CCNC: 52 U/L (ref 39–117)
ALT SERPL W P-5'-P-CCNC: 16 U/L (ref 1–41)
ANION GAP SERPL CALCULATED.3IONS-SCNC: 9.1 MMOL/L (ref 5–15)
AST SERPL-CCNC: 24 U/L (ref 1–40)
BILIRUB SERPL-MCNC: 0.4 MG/DL (ref 0.2–1.2)
BUN BLD-MCNC: 15 MG/DL (ref 6–20)
BUN/CREAT SERPL: 21.7 (ref 7–25)
CALCIUM SPEC-SCNC: 8.5 MG/DL (ref 8.6–10.5)
CHLORIDE SERPL-SCNC: 98 MMOL/L (ref 98–107)
CO2 SERPL-SCNC: 23.9 MMOL/L (ref 22–29)
CREAT BLD-MCNC: 0.69 MG/DL (ref 0.76–1.27)
GFR SERPL CREATININE-BSD FRML MDRD: 120 ML/MIN/1.73
GLOBULIN UR ELPH-MCNC: 4.9 GM/DL
GLUCOSE BLD-MCNC: 233 MG/DL (ref 65–99)
GLUCOSE BLDC GLUCOMTR-MCNC: 198 MG/DL (ref 70–130)
GLUCOSE BLDC GLUCOMTR-MCNC: 200 MG/DL (ref 70–130)
GLUCOSE BLDC GLUCOMTR-MCNC: 209 MG/DL (ref 70–130)
GLUCOSE BLDC GLUCOMTR-MCNC: 227 MG/DL (ref 70–130)
GLUCOSE BLDC GLUCOMTR-MCNC: 307 MG/DL (ref 70–130)
IGG CSF-MCNC: 56.8 MG/DL (ref 0–8.6)
IGG SERPL-MCNC: 1470 MG/DL (ref 603–1613)
IGG SYNTH RATE SER+CSF CALC-MRATE: 157.5 MG/DAY
IGG/ALB CLEAR SER+CSF-RTO: 1 (ref 0–0.7)
IGG/ALB CSF: 0.48 {RATIO} (ref 0–0.25)
OLIGOCLONAL BANDS.IT SER+CSF QL: NORMAL
POTASSIUM BLD-SCNC: 4.2 MMOL/L (ref 3.5–5.2)
PROT SERPL-MCNC: 7.9 G/DL (ref 6–8.5)
SODIUM BLD-SCNC: 131 MMOL/L (ref 136–145)

## 2020-06-26 PROCEDURE — 25010000002 IMMUNE GLOBULIN (HUMAN) 40 GM/400ML SOLUTION: Performed by: INTERNAL MEDICINE

## 2020-06-26 PROCEDURE — 25010000002 FENTANYL CITRATE (PF) 100 MCG/2ML SOLUTION: Performed by: INTERNAL MEDICINE

## 2020-06-26 PROCEDURE — 84681 ASSAY OF C-PEPTIDE: CPT | Performed by: INTERNAL MEDICINE

## 2020-06-26 PROCEDURE — 86341 ISLET CELL ANTIBODY: CPT | Performed by: INTERNAL MEDICINE

## 2020-06-26 PROCEDURE — 80053 COMPREHEN METABOLIC PANEL: CPT | Performed by: INTERNAL MEDICINE

## 2020-06-26 PROCEDURE — 63710000001 INSULIN LISPRO (HUMAN) PER 5 UNITS: Performed by: INTERNAL MEDICINE

## 2020-06-26 PROCEDURE — 99232 SBSQ HOSP IP/OBS MODERATE 35: CPT | Performed by: INTERNAL MEDICINE

## 2020-06-26 PROCEDURE — 25010000002 ENOXAPARIN PER 10 MG: Performed by: INTERNAL MEDICINE

## 2020-06-26 PROCEDURE — 63710000001 INSULIN GLARGINE PER 5 UNITS: Performed by: INTERNAL MEDICINE

## 2020-06-26 PROCEDURE — 99232 SBSQ HOSP IP/OBS MODERATE 35: CPT | Performed by: PSYCHIATRY & NEUROLOGY

## 2020-06-26 PROCEDURE — 63710000001 INSULIN REGULAR HUMAN PER 5 UNITS: Performed by: INTERNAL MEDICINE

## 2020-06-26 PROCEDURE — 82962 GLUCOSE BLOOD TEST: CPT

## 2020-06-26 PROCEDURE — 86337 INSULIN ANTIBODIES: CPT | Performed by: INTERNAL MEDICINE

## 2020-06-26 RX ORDER — DIPHENHYDRAMINE HCL 12.5MG/5ML
25 LIQUID (ML) ORAL DAILY
Status: COMPLETED | OUTPATIENT
Start: 2020-06-26 | End: 2020-06-28

## 2020-06-26 RX ADMIN — SODIUM CHLORIDE, PRESERVATIVE FREE 10 ML: 5 INJECTION INTRAVENOUS at 20:06

## 2020-06-26 RX ADMIN — FENTANYL CITRATE 100 MCG: 50 INJECTION, SOLUTION INTRAMUSCULAR; INTRAVENOUS at 00:12

## 2020-06-26 RX ADMIN — INSULIN GLARGINE 25 UNITS: 100 INJECTION, SOLUTION SUBCUTANEOUS at 20:20

## 2020-06-26 RX ADMIN — INSULIN LISPRO 12 UNITS: 100 INJECTION, SOLUTION INTRAVENOUS; SUBCUTANEOUS at 12:33

## 2020-06-26 RX ADMIN — IMMUNE GLOBULIN (HUMAN) 40 G: 10 INJECTION INTRAVENOUS; SUBCUTANEOUS at 13:26

## 2020-06-26 RX ADMIN — LATANOPROST 1 DROP: 50 SOLUTION OPHTHALMIC at 20:05

## 2020-06-26 RX ADMIN — INSULIN LISPRO 3 UNITS: 100 INJECTION, SOLUTION INTRAVENOUS; SUBCUTANEOUS at 20:20

## 2020-06-26 RX ADMIN — TIMOLOL MALEATE 1 DROP: 2.5 SOLUTION/ DROPS OPHTHALMIC at 20:06

## 2020-06-26 RX ADMIN — ATORVASTATIN CALCIUM 10 MG: 20 TABLET, FILM COATED ORAL at 09:29

## 2020-06-26 RX ADMIN — BRIMONIDINE TARTRATE 1 DROP: 1.5 SOLUTION OPHTHALMIC at 20:05

## 2020-06-26 RX ADMIN — SODIUM CHLORIDE 100 MG: 9 INJECTION, SOLUTION INTRAVENOUS at 08:24

## 2020-06-26 RX ADMIN — RISPERIDONE 0.5 MG: 0.5 TABLET, FILM COATED ORAL at 20:05

## 2020-06-26 RX ADMIN — ENOXAPARIN SODIUM 40 MG: 40 INJECTION SUBCUTANEOUS at 12:33

## 2020-06-26 RX ADMIN — NYSTATIN: 100000 POWDER TOPICAL at 08:25

## 2020-06-26 RX ADMIN — SODIUM CHLORIDE, PRESERVATIVE FREE 10 ML: 5 INJECTION INTRAVENOUS at 08:25

## 2020-06-26 RX ADMIN — LATANOPROST 1 DROP: 50 SOLUTION OPHTHALMIC at 00:55

## 2020-06-26 RX ADMIN — INSULIN GLARGINE 25 UNITS: 100 INJECTION, SOLUTION SUBCUTANEOUS at 09:29

## 2020-06-26 RX ADMIN — BRIMONIDINE TARTRATE 1 DROP: 1.5 SOLUTION OPHTHALMIC at 09:24

## 2020-06-26 RX ADMIN — DIPHENHYDRAMINE HYDROCHLORIDE 25 MG: 25 SOLUTION ORAL at 12:33

## 2020-06-26 RX ADMIN — SODIUM CHLORIDE 100 MG: 9 INJECTION, SOLUTION INTRAVENOUS at 21:12

## 2020-06-26 RX ADMIN — LEVOTHYROXINE SODIUM 150 MCG: 150 TABLET ORAL at 05:50

## 2020-06-26 RX ADMIN — INSULIN HUMAN 4 UNITS: 100 INJECTION, SOLUTION PARENTERAL at 05:50

## 2020-06-26 RX ADMIN — INSULIN HUMAN 2 UNITS: 100 INJECTION, SOLUTION PARENTERAL at 02:38

## 2020-06-26 RX ADMIN — ACETAMINOPHEN ORAL SOLUTION 650 MG: 325 SOLUTION ORAL at 09:26

## 2020-06-26 RX ADMIN — NYSTATIN: 100000 POWDER TOPICAL at 20:05

## 2020-06-26 RX ADMIN — TIMOLOL MALEATE 1 DROP: 2.5 SOLUTION/ DROPS OPHTHALMIC at 09:23

## 2020-06-27 LAB
C PEPTIDE SERPL-MCNC: <0.1 NG/ML (ref 1.1–4.4)
GLUCOSE BLDC GLUCOMTR-MCNC: 116 MG/DL (ref 70–130)
GLUCOSE BLDC GLUCOMTR-MCNC: 124 MG/DL (ref 70–130)
GLUCOSE BLDC GLUCOMTR-MCNC: 148 MG/DL (ref 70–130)
GLUCOSE BLDC GLUCOMTR-MCNC: 172 MG/DL (ref 70–130)
GLUCOSE BLDC GLUCOMTR-MCNC: 247 MG/DL (ref 70–130)

## 2020-06-27 PROCEDURE — 25010000002 IMMUNE GLOBULIN (HUMAN) 20 GM/200ML SOLUTION: Performed by: INTERNAL MEDICINE

## 2020-06-27 PROCEDURE — 82962 GLUCOSE BLOOD TEST: CPT

## 2020-06-27 PROCEDURE — 63710000001 INSULIN GLARGINE PER 5 UNITS: Performed by: INTERNAL MEDICINE

## 2020-06-27 PROCEDURE — 63710000001 INSULIN LISPRO (HUMAN) PER 5 UNITS: Performed by: INTERNAL MEDICINE

## 2020-06-27 PROCEDURE — 99232 SBSQ HOSP IP/OBS MODERATE 35: CPT | Performed by: PSYCHIATRY & NEUROLOGY

## 2020-06-27 PROCEDURE — 25010000002 ENOXAPARIN PER 10 MG: Performed by: INTERNAL MEDICINE

## 2020-06-27 PROCEDURE — 99233 SBSQ HOSP IP/OBS HIGH 50: CPT | Performed by: INTERNAL MEDICINE

## 2020-06-27 RX ORDER — LEVOTHYROXINE SODIUM 20 UG/ML
125 INJECTION, SOLUTION INTRAVENOUS
Status: DISCONTINUED | OUTPATIENT
Start: 2020-06-27 | End: 2020-06-29

## 2020-06-27 RX ORDER — SODIUM CHLORIDE 1000 MG
1 TABLET, SOLUBLE MISCELLANEOUS 2 TIMES DAILY WITH MEALS
Status: DISCONTINUED | OUTPATIENT
Start: 2020-06-27 | End: 2020-06-29

## 2020-06-27 RX ORDER — RISPERIDONE 0.5 MG/1
0.5 TABLET ORAL EVERY 12 HOURS SCHEDULED
Status: DISCONTINUED | OUTPATIENT
Start: 2020-06-27 | End: 2020-07-03 | Stop reason: HOSPADM

## 2020-06-27 RX ADMIN — SODIUM CHLORIDE 100 MG: 9 INJECTION, SOLUTION INTRAVENOUS at 09:50

## 2020-06-27 RX ADMIN — ENOXAPARIN SODIUM 40 MG: 40 INJECTION SUBCUTANEOUS at 11:49

## 2020-06-27 RX ADMIN — INSULIN GLARGINE 25 UNITS: 100 INJECTION, SOLUTION SUBCUTANEOUS at 09:50

## 2020-06-27 RX ADMIN — LEVOTHYROXINE SODIUM 150 MCG: 150 TABLET ORAL at 05:52

## 2020-06-27 RX ADMIN — INSULIN GLARGINE 25 UNITS: 100 INJECTION, SOLUTION SUBCUTANEOUS at 21:17

## 2020-06-27 RX ADMIN — INSULIN LISPRO 6 UNITS: 100 INJECTION, SOLUTION INTRAVENOUS; SUBCUTANEOUS at 11:48

## 2020-06-27 RX ADMIN — IMMUNE GLOBULIN (HUMAN) 20 G: 10 INJECTION INTRAVENOUS; SUBCUTANEOUS at 11:47

## 2020-06-27 RX ADMIN — INSULIN LISPRO 3 UNITS: 100 INJECTION, SOLUTION INTRAVENOUS; SUBCUTANEOUS at 00:34

## 2020-06-27 RX ADMIN — SODIUM CHLORIDE TAB 1 GM 1 G: 1 TAB at 21:16

## 2020-06-27 RX ADMIN — IMMUNE GLOBULIN (HUMAN) 20 G: 10 INJECTION INTRAVENOUS; SUBCUTANEOUS at 11:48

## 2020-06-27 RX ADMIN — RISPERIDONE 0.5 MG: 0.5 TABLET, FILM COATED ORAL at 21:17

## 2020-06-27 RX ADMIN — SODIUM CHLORIDE 100 MG: 9 INJECTION, SOLUTION INTRAVENOUS at 21:17

## 2020-06-27 RX ADMIN — NYSTATIN: 100000 POWDER TOPICAL at 09:50

## 2020-06-27 RX ADMIN — RISPERIDONE 0.5 MG: 0.5 TABLET, FILM COATED ORAL at 11:49

## 2020-06-27 RX ADMIN — ACETAMINOPHEN ORAL SOLUTION 650 MG: 325 SOLUTION ORAL at 09:50

## 2020-06-27 RX ADMIN — DIPHENHYDRAMINE HYDROCHLORIDE 25 MG: 25 SOLUTION ORAL at 09:50

## 2020-06-27 RX ADMIN — BRIMONIDINE TARTRATE 1 DROP: 1.5 SOLUTION OPHTHALMIC at 09:50

## 2020-06-27 RX ADMIN — BRIMONIDINE TARTRATE 1 DROP: 1.5 SOLUTION OPHTHALMIC at 21:06

## 2020-06-27 RX ADMIN — SODIUM CHLORIDE, PRESERVATIVE FREE 10 ML: 5 INJECTION INTRAVENOUS at 09:50

## 2020-06-27 RX ADMIN — TIMOLOL MALEATE 1 DROP: 2.5 SOLUTION/ DROPS OPHTHALMIC at 21:06

## 2020-06-27 RX ADMIN — TIMOLOL MALEATE 1 DROP: 2.5 SOLUTION/ DROPS OPHTHALMIC at 09:50

## 2020-06-27 RX ADMIN — LATANOPROST 1 DROP: 50 SOLUTION OPHTHALMIC at 21:16

## 2020-06-27 RX ADMIN — SODIUM CHLORIDE, PRESERVATIVE FREE 10 ML: 5 INJECTION INTRAVENOUS at 21:19

## 2020-06-27 RX ADMIN — NYSTATIN: 100000 POWDER TOPICAL at 21:07

## 2020-06-27 RX ADMIN — ATORVASTATIN CALCIUM 10 MG: 20 TABLET, FILM COATED ORAL at 09:50

## 2020-06-27 RX ADMIN — LEVOTHYROXINE SODIUM 125 MCG: 20 INJECTION, SOLUTION INTRAVENOUS at 11:49

## 2020-06-28 LAB
ANION GAP SERPL CALCULATED.3IONS-SCNC: 8.8 MMOL/L (ref 5–15)
BUN BLD-MCNC: 14 MG/DL (ref 6–20)
BUN/CREAT SERPL: 21.2 (ref 7–25)
CALCIUM SPEC-SCNC: 8.5 MG/DL (ref 8.6–10.5)
CHLORIDE SERPL-SCNC: 95 MMOL/L (ref 98–107)
CO2 SERPL-SCNC: 22.2 MMOL/L (ref 22–29)
CREAT BLD-MCNC: 0.66 MG/DL (ref 0.76–1.27)
DEPRECATED RDW RBC AUTO: 48.2 FL (ref 37–54)
ERYTHROCYTE [DISTWIDTH] IN BLOOD BY AUTOMATED COUNT: 14.8 % (ref 12.3–15.4)
GFR SERPL CREATININE-BSD FRML MDRD: 126 ML/MIN/1.73
GLUCOSE BLD-MCNC: 310 MG/DL (ref 65–99)
GLUCOSE BLDC GLUCOMTR-MCNC: 140 MG/DL (ref 70–130)
GLUCOSE BLDC GLUCOMTR-MCNC: 167 MG/DL (ref 70–130)
GLUCOSE BLDC GLUCOMTR-MCNC: 299 MG/DL (ref 70–130)
GLUCOSE BLDC GLUCOMTR-MCNC: 96 MG/DL (ref 70–130)
GLUCOSE BLDC GLUCOMTR-MCNC: 99 MG/DL (ref 70–130)
HCT VFR BLD AUTO: 38.5 % (ref 37.5–51)
HGB BLD-MCNC: 12.5 G/DL (ref 13–17.7)
MCH RBC QN AUTO: 29 PG (ref 26.6–33)
MCHC RBC AUTO-ENTMCNC: 32.5 G/DL (ref 31.5–35.7)
MCV RBC AUTO: 89.3 FL (ref 79–97)
PLATELET # BLD AUTO: 300 10*3/MM3 (ref 140–450)
PMV BLD AUTO: 10.2 FL (ref 6–12)
POTASSIUM BLD-SCNC: 4.5 MMOL/L (ref 3.5–5.2)
RBC # BLD AUTO: 4.31 10*6/MM3 (ref 4.14–5.8)
SODIUM BLD-SCNC: 126 MMOL/L (ref 136–145)
T3FREE SERPL-MCNC: 1.81 PG/ML (ref 2–4.4)
T4 FREE SERPL-MCNC: 0.99 NG/DL (ref 0.93–1.7)
WBC NRBC COR # BLD: 4.13 10*3/MM3 (ref 3.4–10.8)

## 2020-06-28 PROCEDURE — 25010000002 ENOXAPARIN PER 10 MG: Performed by: INTERNAL MEDICINE

## 2020-06-28 PROCEDURE — 82962 GLUCOSE BLOOD TEST: CPT

## 2020-06-28 PROCEDURE — 63710000001 INSULIN GLARGINE PER 5 UNITS: Performed by: INTERNAL MEDICINE

## 2020-06-28 PROCEDURE — 99233 SBSQ HOSP IP/OBS HIGH 50: CPT | Performed by: INTERNAL MEDICINE

## 2020-06-28 PROCEDURE — 97162 PT EVAL MOD COMPLEX 30 MIN: CPT

## 2020-06-28 PROCEDURE — 25010000002 IMMUNE GLOBULIN (HUMAN) 20 GM/200ML SOLUTION: Performed by: INTERNAL MEDICINE

## 2020-06-28 PROCEDURE — 99231 SBSQ HOSP IP/OBS SF/LOW 25: CPT | Performed by: PSYCHIATRY & NEUROLOGY

## 2020-06-28 PROCEDURE — 80048 BASIC METABOLIC PNL TOTAL CA: CPT | Performed by: INTERNAL MEDICINE

## 2020-06-28 PROCEDURE — 63710000001 INSULIN LISPRO (HUMAN) PER 5 UNITS: Performed by: INTERNAL MEDICINE

## 2020-06-28 PROCEDURE — 84481 FREE ASSAY (FT-3): CPT | Performed by: INTERNAL MEDICINE

## 2020-06-28 PROCEDURE — 84439 ASSAY OF FREE THYROXINE: CPT | Performed by: INTERNAL MEDICINE

## 2020-06-28 PROCEDURE — 97530 THERAPEUTIC ACTIVITIES: CPT

## 2020-06-28 PROCEDURE — 85027 COMPLETE CBC AUTOMATED: CPT | Performed by: INTERNAL MEDICINE

## 2020-06-28 RX ORDER — INSULIN GLARGINE 100 [IU]/ML
20 INJECTION, SOLUTION SUBCUTANEOUS EVERY 12 HOURS SCHEDULED
Status: DISCONTINUED | OUTPATIENT
Start: 2020-06-28 | End: 2020-07-01

## 2020-06-28 RX ADMIN — RISPERIDONE 0.5 MG: 0.5 TABLET, FILM COATED ORAL at 08:34

## 2020-06-28 RX ADMIN — SODIUM CHLORIDE TAB 1 GM 1 G: 1 TAB at 08:34

## 2020-06-28 RX ADMIN — NYSTATIN: 100000 POWDER TOPICAL at 10:28

## 2020-06-28 RX ADMIN — IMMUNE GLOBULIN (HUMAN) 20 G: 10 INJECTION INTRAVENOUS; SUBCUTANEOUS at 12:39

## 2020-06-28 RX ADMIN — INSULIN GLARGINE 20 UNITS: 100 INJECTION, SOLUTION SUBCUTANEOUS at 21:14

## 2020-06-28 RX ADMIN — SODIUM CHLORIDE, PRESERVATIVE FREE 10 ML: 5 INJECTION INTRAVENOUS at 20:58

## 2020-06-28 RX ADMIN — NYSTATIN: 100000 POWDER TOPICAL at 20:58

## 2020-06-28 RX ADMIN — ENOXAPARIN SODIUM 40 MG: 40 INJECTION SUBCUTANEOUS at 12:38

## 2020-06-28 RX ADMIN — RISPERIDONE 0.5 MG: 0.5 TABLET, FILM COATED ORAL at 20:58

## 2020-06-28 RX ADMIN — SODIUM CHLORIDE, PRESERVATIVE FREE 10 ML: 5 INJECTION INTRAVENOUS at 08:35

## 2020-06-28 RX ADMIN — SODIUM CHLORIDE TAB 1 GM 1 G: 1 TAB at 17:44

## 2020-06-28 RX ADMIN — IMMUNE GLOBULIN (HUMAN) 20 G: 10 INJECTION INTRAVENOUS; SUBCUTANEOUS at 09:27

## 2020-06-28 RX ADMIN — LEVOTHYROXINE SODIUM 125 MCG: 20 INJECTION, SOLUTION INTRAVENOUS at 13:37

## 2020-06-28 RX ADMIN — TIMOLOL MALEATE 1 DROP: 2.5 SOLUTION/ DROPS OPHTHALMIC at 08:35

## 2020-06-28 RX ADMIN — INSULIN GLARGINE 25 UNITS: 100 INJECTION, SOLUTION SUBCUTANEOUS at 08:36

## 2020-06-28 RX ADMIN — SODIUM CHLORIDE 100 MG: 9 INJECTION, SOLUTION INTRAVENOUS at 08:33

## 2020-06-28 RX ADMIN — TIMOLOL MALEATE 1 DROP: 2.5 SOLUTION/ DROPS OPHTHALMIC at 20:58

## 2020-06-28 RX ADMIN — DIPHENHYDRAMINE HYDROCHLORIDE 25 MG: 25 SOLUTION ORAL at 08:34

## 2020-06-28 RX ADMIN — ATORVASTATIN CALCIUM 10 MG: 20 TABLET, FILM COATED ORAL at 08:34

## 2020-06-28 RX ADMIN — LATANOPROST 1 DROP: 50 SOLUTION OPHTHALMIC at 20:57

## 2020-06-28 RX ADMIN — SODIUM CHLORIDE 100 MG: 9 INJECTION, SOLUTION INTRAVENOUS at 20:58

## 2020-06-28 RX ADMIN — BRIMONIDINE TARTRATE 1 DROP: 1.5 SOLUTION OPHTHALMIC at 08:35

## 2020-06-28 RX ADMIN — INSULIN LISPRO 3 UNITS: 100 INJECTION, SOLUTION INTRAVENOUS; SUBCUTANEOUS at 22:20

## 2020-06-28 RX ADMIN — INSULIN LISPRO 9 UNITS: 100 INJECTION, SOLUTION INTRAVENOUS; SUBCUTANEOUS at 12:38

## 2020-06-28 RX ADMIN — ACETAMINOPHEN ORAL SOLUTION 650 MG: 325 SOLUTION ORAL at 08:34

## 2020-06-28 RX ADMIN — BRIMONIDINE TARTRATE 1 DROP: 1.5 SOLUTION OPHTHALMIC at 20:57

## 2020-06-29 LAB
ANION GAP SERPL CALCULATED.3IONS-SCNC: 6.4 MMOL/L (ref 5–15)
BUN BLD-MCNC: 15 MG/DL (ref 6–20)
BUN/CREAT SERPL: 24.2 (ref 7–25)
CALCIUM SPEC-SCNC: 8.9 MG/DL (ref 8.6–10.5)
CHLORIDE SERPL-SCNC: 95 MMOL/L (ref 98–107)
CO2 SERPL-SCNC: 25.6 MMOL/L (ref 22–29)
CREAT BLD-MCNC: 0.62 MG/DL (ref 0.76–1.27)
DEPRECATED RDW RBC AUTO: 48.5 FL (ref 37–54)
ERYTHROCYTE [DISTWIDTH] IN BLOOD BY AUTOMATED COUNT: 15.1 % (ref 12.3–15.4)
GFR SERPL CREATININE-BSD FRML MDRD: 136 ML/MIN/1.73
GLUCOSE BLD-MCNC: 172 MG/DL (ref 65–99)
GLUCOSE BLDC GLUCOMTR-MCNC: 173 MG/DL (ref 70–130)
GLUCOSE BLDC GLUCOMTR-MCNC: 179 MG/DL (ref 70–130)
GLUCOSE BLDC GLUCOMTR-MCNC: 192 MG/DL (ref 70–130)
GLUCOSE BLDC GLUCOMTR-MCNC: 200 MG/DL (ref 70–130)
HCT VFR BLD AUTO: 35.7 % (ref 37.5–51)
HGB BLD-MCNC: 11.9 G/DL (ref 13–17.7)
MCH RBC QN AUTO: 29.1 PG (ref 26.6–33)
MCHC RBC AUTO-ENTMCNC: 33.3 G/DL (ref 31.5–35.7)
MCV RBC AUTO: 87.3 FL (ref 79–97)
PANC ISLET CELL AB TITR SER: NEGATIVE {TITER}
PLATELET # BLD AUTO: 340 10*3/MM3 (ref 140–450)
PMV BLD AUTO: 11.1 FL (ref 6–12)
POTASSIUM BLD-SCNC: 4.1 MMOL/L (ref 3.5–5.2)
RBC # BLD AUTO: 4.09 10*6/MM3 (ref 4.14–5.8)
SODIUM BLD-SCNC: 127 MMOL/L (ref 136–145)
WBC NRBC COR # BLD: 3.81 10*3/MM3 (ref 3.4–10.8)

## 2020-06-29 PROCEDURE — 92610 EVALUATE SWALLOWING FUNCTION: CPT | Performed by: SPEECH-LANGUAGE PATHOLOGIST

## 2020-06-29 PROCEDURE — 25010000002 HALOPERIDOL LACTATE PER 5 MG: Performed by: INTERNAL MEDICINE

## 2020-06-29 PROCEDURE — 97535 SELF CARE MNGMENT TRAINING: CPT

## 2020-06-29 PROCEDURE — 63710000001 INSULIN GLARGINE PER 5 UNITS: Performed by: INTERNAL MEDICINE

## 2020-06-29 PROCEDURE — 80048 BASIC METABOLIC PNL TOTAL CA: CPT | Performed by: INTERNAL MEDICINE

## 2020-06-29 PROCEDURE — 97530 THERAPEUTIC ACTIVITIES: CPT

## 2020-06-29 PROCEDURE — 85027 COMPLETE CBC AUTOMATED: CPT | Performed by: INTERNAL MEDICINE

## 2020-06-29 PROCEDURE — 82962 GLUCOSE BLOOD TEST: CPT

## 2020-06-29 PROCEDURE — 25010000002 ENOXAPARIN PER 10 MG: Performed by: INTERNAL MEDICINE

## 2020-06-29 PROCEDURE — 97166 OT EVAL MOD COMPLEX 45 MIN: CPT

## 2020-06-29 PROCEDURE — 99233 SBSQ HOSP IP/OBS HIGH 50: CPT | Performed by: INTERNAL MEDICINE

## 2020-06-29 PROCEDURE — 99231 SBSQ HOSP IP/OBS SF/LOW 25: CPT | Performed by: PSYCHIATRY & NEUROLOGY

## 2020-06-29 PROCEDURE — 97110 THERAPEUTIC EXERCISES: CPT

## 2020-06-29 PROCEDURE — 63710000001 INSULIN LISPRO (HUMAN) PER 5 UNITS: Performed by: INTERNAL MEDICINE

## 2020-06-29 RX ORDER — SODIUM CHLORIDE 1000 MG
2 TABLET, SOLUBLE MISCELLANEOUS 2 TIMES DAILY WITH MEALS
Status: DISCONTINUED | OUTPATIENT
Start: 2020-06-30 | End: 2020-07-03 | Stop reason: HOSPADM

## 2020-06-29 RX ORDER — LEVOTHYROXINE SODIUM 20 UG/ML
150 INJECTION, SOLUTION INTRAVENOUS
Status: DISCONTINUED | OUTPATIENT
Start: 2020-06-30 | End: 2020-07-01

## 2020-06-29 RX ADMIN — SODIUM CHLORIDE, PRESERVATIVE FREE 10 ML: 5 INJECTION INTRAVENOUS at 08:28

## 2020-06-29 RX ADMIN — ENOXAPARIN SODIUM 40 MG: 40 INJECTION SUBCUTANEOUS at 11:51

## 2020-06-29 RX ADMIN — INSULIN LISPRO 3 UNITS: 100 INJECTION, SOLUTION INTRAVENOUS; SUBCUTANEOUS at 04:09

## 2020-06-29 RX ADMIN — RISPERIDONE 0.5 MG: 0.5 TABLET, FILM COATED ORAL at 08:27

## 2020-06-29 RX ADMIN — TIMOLOL MALEATE 1 DROP: 2.5 SOLUTION/ DROPS OPHTHALMIC at 08:31

## 2020-06-29 RX ADMIN — LEVOTHYROXINE SODIUM 125 MCG: 20 INJECTION, SOLUTION INTRAVENOUS at 11:50

## 2020-06-29 RX ADMIN — INSULIN LISPRO 3 UNITS: 100 INJECTION, SOLUTION INTRAVENOUS; SUBCUTANEOUS at 16:54

## 2020-06-29 RX ADMIN — INSULIN LISPRO 3 UNITS: 100 INJECTION, SOLUTION INTRAVENOUS; SUBCUTANEOUS at 11:51

## 2020-06-29 RX ADMIN — HALOPERIDOL LACTATE 2 MG: 5 INJECTION, SOLUTION INTRAMUSCULAR at 00:28

## 2020-06-29 RX ADMIN — TIMOLOL MALEATE 1 DROP: 2.5 SOLUTION/ DROPS OPHTHALMIC at 21:07

## 2020-06-29 RX ADMIN — SODIUM CHLORIDE 100 MG: 9 INJECTION, SOLUTION INTRAVENOUS at 21:07

## 2020-06-29 RX ADMIN — NYSTATIN: 100000 POWDER TOPICAL at 21:07

## 2020-06-29 RX ADMIN — BRIMONIDINE TARTRATE 1 DROP: 1.5 SOLUTION OPHTHALMIC at 08:31

## 2020-06-29 RX ADMIN — SODIUM CHLORIDE TAB 1 GM 1 G: 1 TAB at 08:27

## 2020-06-29 RX ADMIN — INSULIN LISPRO 3 UNITS: 100 INJECTION, SOLUTION INTRAVENOUS; SUBCUTANEOUS at 08:27

## 2020-06-29 RX ADMIN — ATORVASTATIN CALCIUM 10 MG: 20 TABLET, FILM COATED ORAL at 08:27

## 2020-06-29 RX ADMIN — NYSTATIN: 100000 POWDER TOPICAL at 08:28

## 2020-06-29 RX ADMIN — RISPERIDONE 0.5 MG: 0.5 TABLET, FILM COATED ORAL at 21:07

## 2020-06-29 RX ADMIN — SODIUM CHLORIDE TAB 1 GM 1 G: 1 TAB at 17:19

## 2020-06-29 RX ADMIN — BRIMONIDINE TARTRATE 1 DROP: 1.5 SOLUTION OPHTHALMIC at 21:07

## 2020-06-29 RX ADMIN — SODIUM CHLORIDE 100 MG: 9 INJECTION, SOLUTION INTRAVENOUS at 08:28

## 2020-06-29 RX ADMIN — INSULIN GLARGINE 20 UNITS: 100 INJECTION, SOLUTION SUBCUTANEOUS at 08:27

## 2020-06-30 ENCOUNTER — APPOINTMENT (OUTPATIENT)
Dept: GENERAL RADIOLOGY | Facility: HOSPITAL | Age: 55
End: 2020-06-30

## 2020-06-30 LAB
ANION GAP SERPL CALCULATED.3IONS-SCNC: 8.6 MMOL/L (ref 5–15)
BUN SERPL-MCNC: 18 MG/DL (ref 6–20)
BUN/CREAT SERPL: 24.3 (ref 7–25)
CALCIUM SPEC-SCNC: 9.2 MG/DL (ref 8.6–10.5)
CHLORIDE SERPL-SCNC: 99 MMOL/L (ref 98–107)
CO2 SERPL-SCNC: 25.4 MMOL/L (ref 22–29)
CREAT SERPL-MCNC: 0.74 MG/DL (ref 0.76–1.27)
DEPRECATED RDW RBC AUTO: 46 FL (ref 37–54)
ERYTHROCYTE [DISTWIDTH] IN BLOOD BY AUTOMATED COUNT: 14.9 % (ref 12.3–15.4)
GFR SERPL CREATININE-BSD FRML MDRD: 111 ML/MIN/1.73
GLUCOSE BLDC GLUCOMTR-MCNC: 125 MG/DL (ref 70–130)
GLUCOSE BLDC GLUCOMTR-MCNC: 13 MG/DL (ref 70–130)
GLUCOSE BLDC GLUCOMTR-MCNC: 143 MG/DL (ref 70–130)
GLUCOSE BLDC GLUCOMTR-MCNC: 170 MG/DL (ref 70–130)
GLUCOSE BLDC GLUCOMTR-MCNC: 222 MG/DL (ref 70–130)
GLUCOSE BLDC GLUCOMTR-MCNC: 297 MG/DL (ref 70–130)
GLUCOSE BLDC GLUCOMTR-MCNC: 335 MG/DL (ref 70–130)
GLUCOSE BLDC GLUCOMTR-MCNC: 359 MG/DL (ref 70–130)
GLUCOSE BLDC GLUCOMTR-MCNC: 438 MG/DL (ref 70–130)
GLUCOSE BLDC GLUCOMTR-MCNC: 88 MG/DL (ref 70–130)
GLUCOSE SERPL-MCNC: 183 MG/DL (ref 65–99)
HCT VFR BLD AUTO: 34.2 % (ref 37.5–51)
HGB BLD-MCNC: 11.6 G/DL (ref 13–17.7)
MCH RBC QN AUTO: 29.4 PG (ref 26.6–33)
MCHC RBC AUTO-ENTMCNC: 33.9 G/DL (ref 31.5–35.7)
MCV RBC AUTO: 86.6 FL (ref 79–97)
PLATELET # BLD AUTO: 319 10*3/MM3 (ref 140–450)
PMV BLD AUTO: 11.2 FL (ref 6–12)
POTASSIUM SERPL-SCNC: 4.4 MMOL/L (ref 3.5–5.2)
RBC # BLD AUTO: 3.95 10*6/MM3 (ref 4.14–5.8)
SODIUM SERPL-SCNC: 133 MMOL/L (ref 136–145)
WBC # BLD AUTO: 3.68 10*3/MM3 (ref 3.4–10.8)

## 2020-06-30 PROCEDURE — 85027 COMPLETE CBC AUTOMATED: CPT | Performed by: INTERNAL MEDICINE

## 2020-06-30 PROCEDURE — 92526 ORAL FUNCTION THERAPY: CPT

## 2020-06-30 PROCEDURE — 82962 GLUCOSE BLOOD TEST: CPT

## 2020-06-30 PROCEDURE — 74018 RADEX ABDOMEN 1 VIEW: CPT

## 2020-06-30 PROCEDURE — 63710000001 INSULIN GLARGINE PER 5 UNITS: Performed by: INTERNAL MEDICINE

## 2020-06-30 PROCEDURE — 97112 NEUROMUSCULAR REEDUCATION: CPT

## 2020-06-30 PROCEDURE — 25010000002 ENOXAPARIN PER 10 MG: Performed by: INTERNAL MEDICINE

## 2020-06-30 PROCEDURE — 97110 THERAPEUTIC EXERCISES: CPT

## 2020-06-30 PROCEDURE — 63710000001 INSULIN LISPRO (HUMAN) PER 5 UNITS: Performed by: INTERNAL MEDICINE

## 2020-06-30 PROCEDURE — 99233 SBSQ HOSP IP/OBS HIGH 50: CPT | Performed by: INTERNAL MEDICINE

## 2020-06-30 PROCEDURE — 80048 BASIC METABOLIC PNL TOTAL CA: CPT | Performed by: INTERNAL MEDICINE

## 2020-06-30 RX ORDER — LACOSAMIDE 10 MG/ML
100 SOLUTION ORAL EVERY 12 HOURS SCHEDULED
Status: DISCONTINUED | OUTPATIENT
Start: 2020-06-30 | End: 2020-06-30

## 2020-06-30 RX ORDER — LACOSAMIDE 100 MG/1
100 TABLET ORAL EVERY 12 HOURS SCHEDULED
Status: DISCONTINUED | OUTPATIENT
Start: 2020-06-30 | End: 2020-07-03 | Stop reason: HOSPADM

## 2020-06-30 RX ADMIN — INSULIN LISPRO 15 UNITS: 100 INJECTION, SOLUTION INTRAVENOUS; SUBCUTANEOUS at 08:18

## 2020-06-30 RX ADMIN — BRIMONIDINE TARTRATE 1 DROP: 1.5 SOLUTION OPHTHALMIC at 22:42

## 2020-06-30 RX ADMIN — INSULIN LISPRO 6 UNITS: 100 INJECTION, SOLUTION INTRAVENOUS; SUBCUTANEOUS at 00:09

## 2020-06-30 RX ADMIN — SODIUM CHLORIDE, PRESERVATIVE FREE 10 ML: 5 INJECTION INTRAVENOUS at 22:47

## 2020-06-30 RX ADMIN — TIMOLOL MALEATE 1 DROP: 2.5 SOLUTION/ DROPS OPHTHALMIC at 22:41

## 2020-06-30 RX ADMIN — INSULIN LISPRO 4 UNITS: 100 INJECTION, SOLUTION INTRAVENOUS; SUBCUTANEOUS at 23:00

## 2020-06-30 RX ADMIN — LACOSAMIDE 100 MG: 100 TABLET, FILM COATED ORAL at 22:41

## 2020-06-30 RX ADMIN — BRIMONIDINE TARTRATE 1 DROP: 1.5 SOLUTION OPHTHALMIC at 08:19

## 2020-06-30 RX ADMIN — DEXTROSE MONOHYDRATE 25 G: 500 INJECTION PARENTERAL at 14:40

## 2020-06-30 RX ADMIN — SODIUM CHLORIDE 100 MG: 9 INJECTION, SOLUTION INTRAVENOUS at 08:20

## 2020-06-30 RX ADMIN — LATANOPROST 1 DROP: 50 SOLUTION OPHTHALMIC at 00:07

## 2020-06-30 RX ADMIN — NYSTATIN: 100000 POWDER TOPICAL at 22:47

## 2020-06-30 RX ADMIN — INSULIN LISPRO 20 UNITS: 100 INJECTION, SOLUTION INTRAVENOUS; SUBCUTANEOUS at 12:25

## 2020-06-30 RX ADMIN — INSULIN GLARGINE 20 UNITS: 100 INJECTION, SOLUTION SUBCUTANEOUS at 08:17

## 2020-06-30 RX ADMIN — RISPERIDONE 0.5 MG: 0.5 TABLET, FILM COATED ORAL at 22:41

## 2020-06-30 RX ADMIN — SODIUM CHLORIDE, PRESERVATIVE FREE 10 ML: 5 INJECTION INTRAVENOUS at 08:19

## 2020-06-30 RX ADMIN — SODIUM CHLORIDE, PRESERVATIVE FREE 10 ML: 5 INJECTION INTRAVENOUS at 00:08

## 2020-06-30 RX ADMIN — SODIUM CHLORIDE TAB 1 GM 2 G: 1 TAB at 17:37

## 2020-06-30 RX ADMIN — DEXTROSE MONOHYDRATE 25 G: 500 INJECTION PARENTERAL at 15:00

## 2020-06-30 RX ADMIN — LEVOTHYROXINE SODIUM 150 MCG: 20 INJECTION, SOLUTION INTRAVENOUS at 10:07

## 2020-06-30 RX ADMIN — TIMOLOL MALEATE 1 DROP: 2.5 SOLUTION/ DROPS OPHTHALMIC at 08:19

## 2020-06-30 RX ADMIN — ATORVASTATIN CALCIUM 10 MG: 20 TABLET, FILM COATED ORAL at 08:18

## 2020-06-30 RX ADMIN — ENOXAPARIN SODIUM 40 MG: 40 INJECTION SUBCUTANEOUS at 12:26

## 2020-06-30 RX ADMIN — SODIUM CHLORIDE TAB 1 GM 2 G: 1 TAB at 08:18

## 2020-06-30 RX ADMIN — INSULIN GLARGINE 20 UNITS: 100 INJECTION, SOLUTION SUBCUTANEOUS at 23:00

## 2020-06-30 RX ADMIN — RISPERIDONE 0.5 MG: 0.5 TABLET, FILM COATED ORAL at 08:19

## 2020-06-30 RX ADMIN — INSULIN GLARGINE 20 UNITS: 100 INJECTION, SOLUTION SUBCUTANEOUS at 00:07

## 2020-06-30 RX ADMIN — LATANOPROST 1 DROP: 50 SOLUTION OPHTHALMIC at 22:42

## 2020-07-01 LAB
ANION GAP SERPL CALCULATED.3IONS-SCNC: 11.6 MMOL/L (ref 5–15)
BUN SERPL-MCNC: 20 MG/DL (ref 6–20)
BUN/CREAT SERPL: 27 (ref 7–25)
CALCIUM SPEC-SCNC: 9 MG/DL (ref 8.6–10.5)
CHLORIDE SERPL-SCNC: 98 MMOL/L (ref 98–107)
CO2 SERPL-SCNC: 24.4 MMOL/L (ref 22–29)
CREAT SERPL-MCNC: 0.74 MG/DL (ref 0.76–1.27)
DEPRECATED RDW RBC AUTO: 48 FL (ref 37–54)
ERYTHROCYTE [DISTWIDTH] IN BLOOD BY AUTOMATED COUNT: 15 % (ref 12.3–15.4)
GFR SERPL CREATININE-BSD FRML MDRD: 111 ML/MIN/1.73
GLUCOSE BLDC GLUCOMTR-MCNC: 314 MG/DL (ref 70–130)
GLUCOSE BLDC GLUCOMTR-MCNC: 324 MG/DL (ref 70–130)
GLUCOSE BLDC GLUCOMTR-MCNC: 329 MG/DL (ref 70–130)
GLUCOSE BLDC GLUCOMTR-MCNC: 350 MG/DL (ref 70–130)
GLUCOSE BLDC GLUCOMTR-MCNC: 366 MG/DL (ref 70–130)
GLUCOSE SERPL-MCNC: 253 MG/DL (ref 65–99)
HCT VFR BLD AUTO: 34.1 % (ref 37.5–51)
HGB BLD-MCNC: 11.4 G/DL (ref 13–17.7)
MCH RBC QN AUTO: 29.2 PG (ref 26.6–33)
MCHC RBC AUTO-ENTMCNC: 33.4 G/DL (ref 31.5–35.7)
MCV RBC AUTO: 87.4 FL (ref 79–97)
PLATELET # BLD AUTO: 345 10*3/MM3 (ref 140–450)
PMV BLD AUTO: 11.2 FL (ref 6–12)
POTASSIUM SERPL-SCNC: 4.5 MMOL/L (ref 3.5–5.2)
RBC # BLD AUTO: 3.9 10*6/MM3 (ref 4.14–5.8)
SODIUM SERPL-SCNC: 134 MMOL/L (ref 136–145)
WBC # BLD AUTO: 4.09 10*3/MM3 (ref 3.4–10.8)

## 2020-07-01 PROCEDURE — 80048 BASIC METABOLIC PNL TOTAL CA: CPT | Performed by: INTERNAL MEDICINE

## 2020-07-01 PROCEDURE — 63710000001 INSULIN LISPRO (HUMAN) PER 5 UNITS: Performed by: INTERNAL MEDICINE

## 2020-07-01 PROCEDURE — 97535 SELF CARE MNGMENT TRAINING: CPT

## 2020-07-01 PROCEDURE — 63710000001 INSULIN GLARGINE PER 5 UNITS: Performed by: INTERNAL MEDICINE

## 2020-07-01 PROCEDURE — 82962 GLUCOSE BLOOD TEST: CPT

## 2020-07-01 PROCEDURE — 99233 SBSQ HOSP IP/OBS HIGH 50: CPT | Performed by: INTERNAL MEDICINE

## 2020-07-01 PROCEDURE — 97110 THERAPEUTIC EXERCISES: CPT

## 2020-07-01 PROCEDURE — 85027 COMPLETE CBC AUTOMATED: CPT | Performed by: INTERNAL MEDICINE

## 2020-07-01 PROCEDURE — 25010000002 ENOXAPARIN PER 10 MG: Performed by: INTERNAL MEDICINE

## 2020-07-01 RX ORDER — CHOLECALCIFEROL (VITAMIN D3) 125 MCG
5 CAPSULE ORAL NIGHTLY
Status: DISCONTINUED | OUTPATIENT
Start: 2020-07-01 | End: 2020-07-03 | Stop reason: HOSPADM

## 2020-07-01 RX ORDER — INSULIN GLARGINE 100 [IU]/ML
25 INJECTION, SOLUTION SUBCUTANEOUS EVERY 12 HOURS SCHEDULED
Status: DISCONTINUED | OUTPATIENT
Start: 2020-07-01 | End: 2020-07-02

## 2020-07-01 RX ORDER — LEVOTHYROXINE SODIUM 175 UG/1
175 TABLET ORAL
Status: DISCONTINUED | OUTPATIENT
Start: 2020-07-02 | End: 2020-07-03 | Stop reason: HOSPADM

## 2020-07-01 RX ADMIN — BRIMONIDINE TARTRATE 1 DROP: 1.5 SOLUTION OPHTHALMIC at 08:28

## 2020-07-01 RX ADMIN — INSULIN LISPRO 5 UNITS: 100 INJECTION, SOLUTION INTRAVENOUS; SUBCUTANEOUS at 03:56

## 2020-07-01 RX ADMIN — NYSTATIN: 100000 POWDER TOPICAL at 08:28

## 2020-07-01 RX ADMIN — INSULIN LISPRO 4 UNITS: 100 INJECTION, SOLUTION INTRAVENOUS; SUBCUTANEOUS at 10:14

## 2020-07-01 RX ADMIN — SODIUM CHLORIDE, PRESERVATIVE FREE 10 ML: 5 INJECTION INTRAVENOUS at 08:29

## 2020-07-01 RX ADMIN — INSULIN LISPRO 5 UNITS: 100 INJECTION, SOLUTION INTRAVENOUS; SUBCUTANEOUS at 18:15

## 2020-07-01 RX ADMIN — ENOXAPARIN SODIUM 40 MG: 40 INJECTION SUBCUTANEOUS at 12:29

## 2020-07-01 RX ADMIN — LACOSAMIDE 100 MG: 100 TABLET, FILM COATED ORAL at 08:28

## 2020-07-01 RX ADMIN — INSULIN GLARGINE 20 UNITS: 100 INJECTION, SOLUTION SUBCUTANEOUS at 08:29

## 2020-07-01 RX ADMIN — INSULIN LISPRO 5 UNITS: 100 INJECTION, SOLUTION INTRAVENOUS; SUBCUTANEOUS at 08:28

## 2020-07-01 RX ADMIN — TIMOLOL MALEATE 1 DROP: 2.5 SOLUTION/ DROPS OPHTHALMIC at 08:28

## 2020-07-01 RX ADMIN — INSULIN GLARGINE 25 UNITS: 100 INJECTION, SOLUTION SUBCUTANEOUS at 22:09

## 2020-07-01 RX ADMIN — SODIUM CHLORIDE TAB 1 GM 2 G: 1 TAB at 08:28

## 2020-07-01 RX ADMIN — INSULIN LISPRO 5 UNITS: 100 INJECTION, SOLUTION INTRAVENOUS; SUBCUTANEOUS at 14:47

## 2020-07-01 RX ADMIN — NYSTATIN: 100000 POWDER TOPICAL at 22:09

## 2020-07-01 RX ADMIN — INSULIN LISPRO 4 UNITS: 100 INJECTION, SOLUTION INTRAVENOUS; SUBCUTANEOUS at 12:28

## 2020-07-01 RX ADMIN — LACOSAMIDE 100 MG: 100 TABLET, FILM COATED ORAL at 22:08

## 2020-07-01 RX ADMIN — LEVOTHYROXINE SODIUM 150 MCG: 20 INJECTION, SOLUTION INTRAVENOUS at 10:14

## 2020-07-01 RX ADMIN — ATORVASTATIN CALCIUM 10 MG: 20 TABLET, FILM COATED ORAL at 08:28

## 2020-07-01 RX ADMIN — TIMOLOL MALEATE 1 DROP: 2.5 SOLUTION/ DROPS OPHTHALMIC at 22:09

## 2020-07-01 RX ADMIN — RISPERIDONE 0.5 MG: 0.5 TABLET, FILM COATED ORAL at 08:27

## 2020-07-01 RX ADMIN — RISPERIDONE 0.5 MG: 0.5 TABLET, FILM COATED ORAL at 22:09

## 2020-07-01 RX ADMIN — BRIMONIDINE TARTRATE 1 DROP: 1.5 SOLUTION OPHTHALMIC at 22:09

## 2020-07-01 RX ADMIN — Medication 5 MG: at 22:09

## 2020-07-01 NOTE — PLAN OF CARE
Problem: Patient Care Overview  Goal: Plan of Care Review  Outcome: Ongoing (interventions implemented as appropriate)  Flowsheets (Taken 7/1/2020 1962)  Progress: improving  Plan of Care Reviewed With: patient  Outcome Summary: Pt has been alert and oriented this shift but is having trouble with confusing his days and nights. Did participate with PT and OT today. Has tolerated his MS diet this shift. BG has been in the 300s most of the shift. Endo changed insulin doses. Still has cortrak and tube feeds running at this time. Will CTM the rest of my shift.

## 2020-07-01 NOTE — NURSING NOTE
Noted DM ed order r/t order set. Note plan is [facility] acute vs subacute. DM educator will f/u for educational needs if pt to transfer to our rehab facility.

## 2020-07-01 NOTE — THERAPY TREATMENT NOTE
Patient Name: Anthony Yang  : 1965    MRN: [id number]                              Today's Date: 2020       Admit Date: 2020    Visit Dx:     ICD-10-CM ICD-9-CM   1. Encephalopathy acute G93.40 348.30   2. Leukocytosis, unspecified type D72.829 288.60   3. Elevated procalcitonin R79.89 790.99   4. Seizure (CMS/HCC) R56.9 780.39     Patient Active Problem List   Diagnosis   • Type 2 diabetes mellitus without complication, with long-term current use of insulin (CMS/HCC)   • Type 2 diabetes mellitus without complication (CMS/HCC)   • Hypothyroidism   • Hyperlipidemia   • Hypertension   • Encephalopathy acute     Past Medical History:   Diagnosis Date   • Cataract    • Diabetes mellitus (CMS/[facility])     type 1   • Glaucoma    • Neuropathy    • Seizures (CMS/[facility])      Past Surgical History:   Procedure Laterality Date   • EYE SURGERY     • JOINT REPLACEMENT       General Information     Row Name 20 1258          PT Evaluation Time/Intention    Document Type  therapy note (daily note)  -     Mode of Treatment  physical therapy  -PH     Row Name 20 1258          Safety Issues, Functional Mobility    Safety Issues Affecting Function (Mobility)  insight into deficits/self awareness;judgment  -PH     Impairments Affecting Function (Mobility)  endurance/activity tolerance;balance  -PH       User Key  (r) = Recorded By, (t) = Taken By, (c) = Cosigned By    Initials Name Provider Type    PH Breanne Mccann PTA Physical Therapy Assistant        Mobility     Row Name 20 1259          Bed Mobility Assessment/Treatment    Bed Mobility Assessment/Treatment  supine-sit  -PH     Supine-Sit [place] (Bed Mobility)  supervision;verbal cues  -PH     Row Name 20 1259          Transfer Assessment/Treatment    Comment (Transfers)  Pt directed to remain in bed until instucted; Pt instructed to sit at EOB until gait belt donned.   -     Row Name 20 1259          Sit-Stand  Transfer    Sit-Stand [place] (Transfers)  verbal cues;supervision;contact guard  -PH     Assistive Device (Sit-Stand Transfers)  walker, front-wheeled  -PH     Row Name 07/01/20 1259          Gait/Stairs Assessment/Training    Gait/Stairs Assessment/Training  gait/ambulation independence  -PH     [place] Level (Gait)  contact guard;verbal cues;supervision  -PH     Assistive Device (Gait)  walker, front-wheeled  -PH     Distance in Feet (Gait)  400'   -PH     Pattern (Gait)  step-through  -PH     Deviations/Abnormal Patterns (Gait)  gait speed decreased;stride length decreased;nicole decreased  -PH     Comment (Gait/Stairs)  Pt limited by fatigue, no rest taken  -PH       User Key  (r) = Recorded By, (t) = Taken By, (c) = Cosigned By    Initials Name Provider Type    PH Breanne Mccann PTA Physical Therapy Assistant        Obj/Interventions     Row Name 07/01/20 1301          Therapeutic Exercise    Upper Extremity Range of Motion (Therapeutic Exercise)  shoulder flexion/extension, bilateral;shoulder horizontal abduction/adduction, bilateral  -PH     Lower Extremity (Therapeutic Exercise)  LAQ (long arc quad), bilateral;marching while seated add w/ pillow, abd w/ resist  -PH     Lower Extremity Range of Motion (Therapeutic Exercise)  ankle dorsiflexion/plantar flexion, bilateral  -PH     Exercise Type (Therapeutic Exercise)  AROM (active range of motion)  -PH     Position (Therapeutic Exercise)  seated  -PH     Sets/Reps (Therapeutic Exercise)  1/10  -PH     Comment (Therapeutic Exercise)  pt following directions as given  -PH     Row Name 07/01/20 1301          Static Sitting Balance    Level of [place] (Unsupported Sitting, Static Balance)  supervision  -PH     Sitting Position (Unsupported Sitting, Static Balance)  sitting on edge of bed  -PH     Time Able to Maintain Position (Unsupported Sitting, Static Balance)  1 to 2 minutes  -PH       User Key  (r) = Recorded By, (t) = Taken By, (c)  = Cosigned By    Initials Name Provider Type     Breanne Mccann PTA Physical Therapy Assistant        Goals/Plan    No documentation.       Clinical Impression     Row Name 07/01/20 1302          Pain Scale: Numbers Pre/Post-Treatment    Pain Scale: Numbers, Pretreatment  0/10 - no pain  -PH     Pain Scale: Numbers, Post-Treatment  0/10 - no pain  -PH     Row Name 07/01/20 1302          Plan of Care Review    Plan of Care Reviewed With  patient  -PH     Progress  improving  -PH     Outcome Summary  Pt improving w/ amb of 400' req SV/CGA and use of fww. Pt very quiet and responding w/ 1 word answers although following instructions when given. PT will prog as pt jacque.   -PH     Row Name 07/01/20 1302          Vital Signs    O2 Delivery Pre Treatment  room air  -PH     O2 Delivery Intra Treatment  room air  -PH     O2 Delivery Post Treatment  room air  -PH     Row Name 07/01/20 1302          Positioning and Restraints    Pre-Treatment Position  in bed  -PH     Post Treatment Position  chair  -PH     In Chair  reclined;call light within reach;encouraged to call for assist;exit alarm on  -PH       User Key  (r) = Recorded By, (t) = Taken By, (c) = Cosigned By    Initials Name Provider Type     Breanne Mccann PTA Physical Therapy Assistant        Outcome Measures     Row Name 07/01/20 1303          How much help from another person do you currently need...    Turning from your back to your side while in flat bed without using bedrails?  4  -PH     Moving from lying on back to sitting on the side of a flat bed without bedrails?  4  -PH     Moving to and from a bed to a chair (including a wheelchair)?  3  -PH     Standing up from a chair using your arms (e.g., wheelchair, bedside chair)?  3  -PH     Climbing 3-5 steps with a railing?  2  -PH     To walk in hospital room?  3  -PH     AM-PAC 6 Clicks Score (PT)  19  -PH     Row Name 07/01/20 1303          Functional Assessment    Outcome Measure Options   AM-PAC 6 Clicks Basic Mobility (PT)  -       User Key  (r) = Recorded By, (t) = Taken By, (c) = Cosigned By    Initials Name Provider Type     Breanne Mccann PTA Physical Therapy Assistant        Physical Therapy Education                 Title: PT OT SLP Therapies (In Progress)     Topic: Physical Therapy (Done)     Point: Mobility training (Done)     Description:   Instruct learner(s) on safety and technique for assisting patient out of bed, chair or wheelchair.  Instruct in the proper use of assistive devices, such as walker, crutches, cane or brace.              Patient Friendly Description:   It's important to get you on your feet again, but we need to do so in a way that is safe for you. Falling has serious consequences, and your personal safety is the most important thing of all.        When it's time to get out of bed, one of us or a family member will sit next to you on the bed to give you support.     If your doctor or nurse tells you to use a walker, crutches, a cane, or a brace, be sure you use it every time you get out of bed, even if you think you don't need it.    Learning Progress Summary           Patient Acceptance, E,D, VU,DU by  at 7/1/2020 1304    Acceptance, E,D, VU,NR by  at 6/30/2020 1045    Acceptance, E,D, NR,VU by  at 6/29/2020 1429    Acceptance, E,TB,D, VU,NR by  at 6/28/2020 1216                   Point: Home exercise program (Done)     Description:   Instruct learner(s) on appropriate technique for monitoring, assisting and/or progressing patient with therapeutic exercises and activities.              Learning Progress Summary           Patient Acceptance, E,D, VU,DU by  at 7/1/2020 1304    Acceptance, E,D, VU,NR by  at 6/30/2020 1045    Acceptance, E,D, NR,VU by  at 6/29/2020 1429    Acceptance, E,TB,D, VU,NR by  at 6/28/2020 1216                               User Key     Initials Effective Dates Name Provider Type Located within [facility] 04/03/18 -  Ajay  Karen ZAMUDIO, PT Physical Therapist PT    PH 08/20/19 -  Breanne Mccann PTA Physical Therapy Assistant PT              PT Recommendation and Plan     Outcome Summary/Treatment Plan (PT)  Anticipated Discharge Disposition (PT): home with assist, home with home health  Plan of Care Reviewed With: patient  Progress: improving  Outcome Summary: Pt improving w/ amb of 400' req SV/CGA and use of fww. Pt very quiet and responding w/ 1 word answers although following instructions when given. PT will prog as pt jacque.      Time Calculation:   PT Charges     Row Name 07/01/20 1305             Time Calculation    Start Time  1227  -PH      Stop Time  1250  -PH      Time Calculation (min)  23 min  -PH      PT Received On  07/01/20  -PH      PT - Next Appointment  07/03/20  -        User Key  (r) = Recorded By, (t) = Taken By, (c) = Cosigned By    Initials Name Provider Type     Breanne Mccann PTA Physical Therapy Assistant        Therapy Charges for Today     Code Description Service Date Service Provider Modifiers Qty    29431674576 HC PT THER PROC EA 15 MIN 6/30/2020 Breanne Mccann PTA GP 2    47802336625 HC PT THER PROC EA 15 MIN 7/1/2020 Breanne cMcann, J LUIS GP 2          PT G-Codes  Outcome Measure Options: AM-PAC 6 Clicks Basic Mobility (PT)  AM-PAC 6 Clicks Score (PT): 19  AM-PAC 6 Clicks Score (OT): 12    Breanne Mccann PTA  7/1/2020

## 2020-07-01 NOTE — THERAPY TREATMENT NOTE
Acute Care - Occupational Therapy Progress Note  [facility]     Patient Name: Anthony Yang  : 1965  MRN: [id number]  Today's Date: 2020             Admit Date: 2020       ICD-10-CM ICD-9-CM   1. Encephalopathy acute G93.40 348.30   2. Leukocytosis, unspecified type D72.829 288.60   3. Elevated procalcitonin R79.89 790.99   4. Seizure (CMS/HCC) R56.9 780.39     Patient Active Problem List   Diagnosis   • Type 2 diabetes mellitus without complication, with long-term current use of insulin (CMS/HCC)   • Type 2 diabetes mellitus without complication (CMS/HCC)   • Hypothyroidism   • Hyperlipidemia   • Hypertension   • Encephalopathy acute     Past Medical History:   Diagnosis Date   • Cataract    • Diabetes mellitus (CMS/HCC)     type 1   • Glaucoma    • Neuropathy    • Seizures (CMS/HCC)      Past Surgical History:   Procedure Laterality Date   • EYE SURGERY     • JOINT REPLACEMENT         Therapy Treatment    Rehabilitation Treatment Summary     Row Name 20 1511             Treatment Time/Intention    Discipline  occupational therapist  -SG      Document Type  therapy note (daily note)  -SG      Mode of Treatment  individual therapy;occupational therapy  -SG      Existing Precautions/Restrictions  fall;seizures  -SG      Recorded by [SG] Lizzy Craig OTR 20 1516      Row Name 20 1511             Cognitive Assessment/Intervention- PT/OT    Orientation Status (Cognition)  person;time  -SG      Follows Commands (Cognition)  follows one step commands;delayed response/completion;increased processing time needed;initiation impaired;repetition of directions required;visual queue  -SG      Recorded by [SG] Lizzy Craig OTR 20 1516      Row Name 20 1511             Bed Mobility Assessment/Treatment    Supine-Sit [place] (Bed Mobility)  minimum assist (75% patient effort);verbal cues  -SG      Supine-Sit-Supine [place] (Bed Mobility)  minimum assist  (75% patient effort);verbal cues  -SG      Recorded by [SG] Lizzy Craig, OTR 07/01/20 1516      Row Name 07/01/20 1511             ADL Assessment/Intervention    BADL Assessment/Intervention  upper body dressing;grooming  -SG      Recorded by [SG] Lizzy Craig, OTR 07/01/20 1516      Row Name 07/01/20 1511             Upper Body Dressing Assessment/Training    Upper Body Dressing [place] Level  upper body dressing skills;doff;don;maximum assist (25% patient effort);verbal cues;nonverbal cues (demo/gesture)  -SG      Upper Body Dressing Position  sitting up in bed  -SG      Comment (Upper Body Dressing)  pt requires cues to find sleeve and start gown on arm. Therapist initiated RUE and pt unable to replicate with LUE.   -SG      Recorded by [SG] Lizzy Craig, OTR 07/01/20 1516      Row Name 07/01/20 1511             Grooming Assessment/Training    [place] Level (Grooming)  moderate assist (50% patient effort);verbal cues;nonverbal cues (demo/gesture)  -SG      Comment (Grooming)  Pt identifies washcloth but unable to use for purpose.  Identifies comb and states brush your hair  -SG      Recorded by [SG] Lizzy Craig, OTR 07/01/20 1516      Row Name 07/01/20 1511             BADL Safety/Performance    Impairments, BADL Safety/Performance  cognition  -SG      Skilled BADL Treatment/Intervention  BADL process/adaptation training  -SG      Recorded by [SG] Lizzy Craig, OTR 07/01/20 1516      Row Name 07/01/20 1511             Positioning and Restraints    Pre-Treatment Position  in bed  -SG      Post Treatment Position  bed  -SG      In Bed  call light within reach;encouraged to call for assist;exit alarm on  -SG      Recorded by [SG] Lizzy Craig, OTR 07/01/20 1516      Row Name 07/01/20 1511             Pain Scale: Numbers Pre/Post-Treatment    Pain Scale: Numbers, Pretreatment  0/10 - no pain  -SG      Pain Scale: Numbers, Post-Treatment  0/10 - no pain  -SG      Recorded by [SG]  Lizzy Craig, OTR 07/01/20 1516        User Key  (r) = Recorded By, (t) = Taken By, (c) = Cosigned By    Initials Name Effective Dates Discipline    SG Lizzy Craig, OTR 06/08/18 -  OT        Rash 06/12/20 1300 groin (Active)   Care, Rash other (see comments) 6/30/2020  9:05 PM       Occupational Therapy Education                 Title: PT OT SLP Therapies (In Progress)     Topic: Occupational Therapy (Not Started)     Point: ADL training (Not Started)     Description:   Instruct learner(s) on proper safety adaptation and remediation techniques during self care or transfers.   Instruct in proper use of assistive devices.              Learner Progress:   Not documented in this visit.          Point: Home exercise program (Not Started)     Description:   Instruct learner(s) on appropriate technique for monitoring, assisting and/or progressing therapeutic exercises/activities.              Learner Progress:   Not documented in this visit.          Point: Precautions (Not Started)     Description:   Instruct learner(s) on prescribed precautions during self-care and functional transfers.              Learner Progress:   Not documented in this visit.          Point: Body mechanics (Not Started)     Description:   Instruct learner(s) on proper positioning and spine alignment during self-care, functional mobility activities and/or exercises.              Learner Progress:   Not documented in this visit.                            OT Recommendation and Plan     Plan of Care Review  Plan of Care Reviewed With: patient  Plan of Care Reviewed With: patient  Outcome Summary: Pt alert and agrees to OT. Pt requires max A to don and doff hospital gown requiring max vc's for task sequencing.  Pt requires increased time for adl tasks. Pt identifies items appropriately but does not follow through with use.  Pt will continue to benefit from OT  Outcome Measures     Row Name 07/01/20 1520 06/30/20 1100 06/29/20 1500       How  much help from another is currently needed...    Putting on and taking off regular lower body clothing?  2  -SG  2  -SK  2  -RB    Bathing (including washing, rinsing, and drying)  2  -SG  2  -SK  2  -RB    Toileting (which includes using toilet bed pan or urinal)  2  -SG  2  -SK  2  -RB    Putting on and taking off regular upper body clothing  2  -SG  2  -SK  3  -RB    Taking care of personal grooming (such as brushing teeth)  2  -SG  3  -SK  3  -RB    Eating meals  1  -SG  1  -SK  3  -RB    AM-PAC 6 Clicks Score (OT)  11  -SG  12  -SK  15  -RB       Functional Assessment    Outcome Measure Options  --  --  AM-PAC 6 Clicks Daily Activity (OT)  -RB      User Key  (r) = Recorded By, (t) = Taken By, (c) = Cosigned By    Initials Name Provider Type    Lizzy Dawn OTR Occupational Therapist    SK Katie Chavez, OT Occupational Therapist    RB Zelda Cortez, OT Occupational Therapist           Time Calculation:   Time Calculation- OT     Row Name 07/01/20 1520             Time Calculation- OT    OT Start Time  1445  -      OT Stop Time  1509  -      OT Time Calculation (min)  24 min  -      Total Timed Code Minutes- OT  24 minute(s)  -      OT Received On  07/01/20  -      OT - Next Appointment  07/02/20  -        User Key  (r) = Recorded By, (t) = Taken By, (c) = Cosigned By    Initials Name Provider Type     Lizzy Craig, OTCARLOS Occupational Therapist        Therapy Charges for Today     Code Description Service Date Service Provider Modifiers Qty    83339247450  OT SELF CARE/MGMT/TRAIN EA 15 MIN 7/1/2020 Lizzy Craig OTR GO 2               EDNA Johnson  7/1/2020

## 2020-07-01 NOTE — PLAN OF CARE
Problem: Patient Care Overview  Goal: Plan of Care Review  Flowsheets (Taken 7/1/2020 1516)  Plan of Care Reviewed With: patient  Outcome Summary: Pt alert and agrees to OT. Pt requires max A to don and [facility] gown requiring max vc's for task sequencing.  Pt requires increased time for adl tasks. Pt identifies items appropriately but does not follow through with use.  Pt will continue to benefit from OT   Pt wears mask this encounter. Therapist wears mask, gloves and eye protection. Hand hygiene completed before and after session

## 2020-07-01 NOTE — PROGRESS NOTES
54 y.o.   LOS: 19 days   Patient Care Team:  Timo Griffith MD as PCP - General (Family Medicine)    Chief Complaint: Uncontrolled type 1 diabetes mellitus and hypothyroidism    Chief Complaint   Patient presents with   • Altered Mental Status       Subjective     HPI  Patient's blood sugars are fluctuating between 200-300 range  Apparently he is also eating by mouth 3 times daily and the tube feeds are continuing round-the-clock 24 hours  The plan is apparently to wean off tube feeding as his oral intake improves  His blood sugars are fluctuating significantly currently they are high  He probably needs mealtime scheduling of insulin  He still currently receiving levothyroxine intravenously  We will check labs tomorrow morning  We will switch him over to oral L-thyroxine as well    Interval History:      Review of Systems:      Review of Systems   Constitutional: Negative.    Respiratory: Negative.    Cardiovascular: Negative.    Gastrointestinal: Negative.    All other systems reviewed and are negative.    Objective     Vital Signs   Temp:  [97.8 °F (36.6 °C)-98.2 °F (36.8 °C)] 98.1 °F (36.7 °C)  Heart Rate:  [60-70] 60  Resp:  [16-18] 18  BP: (120-144)/(64-72) 144/72    Physical Exam:  Physical Exam   Cardiovascular: Normal rate, regular rhythm, normal heart sounds and intact distal pulses.   Pulmonary/Chest: Effort normal and breath sounds normal.   Abdominal: Soft. Bowel sounds are normal.   Musculoskeletal: Normal range of motion.   Neurological: He is alert.   Skin: Skin is warm and dry.   Nursing note and vitals reviewed.  Results Review:     I reviewed the patient's new clinical results.      Glucose   Date/Time Value Ref Range Status   07/01/2020 0812 253 (H) 65 - 99 mg/dL Final   06/30/2020 0533 183 (H) 65 - 99 mg/dL Final   06/29/2020 0403 172 (H) 65 - 99 mg/dL Final     Lab Results (last 72 hours)     Procedure Component Value Units Date/Time    POC Glucose Once [[id number]]  (Abnormal)  Collected:  07/01/20 1024    Specimen:  Blood Updated:  07/01/20 1034     Glucose 314 mg/dL     Basic Metabolic Panel [[id number]]  (Abnormal) Collected:  07/01/20 0812    Specimen:  Blood Updated:  07/01/20 0904     Glucose 253 mg/dL      BUN 20 mg/dL      Creatinine 0.74 mg/dL      Sodium 134 mmol/L      Potassium 4.5 mmol/L      Chloride 98 mmol/L      CO2 24.4 mmol/L      Calcium 9.0 mg/dL      eGFR Non African Amer 111 mL/min/1.73      BUN/Creatinine Ratio 27.0     Anion Gap 11.6 mmol/L     Narrative:       GFR Normal >60  Chronic Kidney Disease <60  Kidney Failure <15      CBC (No Diff) [[id number]]  (Abnormal) Collected:  07/01/20 0812    Specimen:  Blood Updated:  07/01/20 0841     WBC 4.09 10*3/mm3      RBC 3.90 10*6/mm3      Hemoglobin 11.4 g/dL      Hematocrit 34.1 %      MCV 87.4 fL      MCH 29.2 pg      MCHC 33.4 g/dL      RDW 15.0 %      RDW-SD 48.0 fl      MPV 11.2 fL      Platelets 345 10*3/mm3     POC Glucose Once [[id number]]  (Abnormal) Collected:  07/01/20 0353    Specimen:  Blood Updated:  07/01/20 0354     Glucose 329 mg/dL     POC Glucose Once [[id number]]  (Abnormal) Collected:  07/01/20 0044    Specimen:  Blood Updated:  07/01/20 0047     Glucose 366 mg/dL     POC Glucose Once [[id number]]  (Abnormal) Collected:  06/30/20 2000    Specimen:  Blood Updated:  06/30/20 2002     Glucose 335 mg/dL     POC Glucose Once [[id number]]  (Abnormal) Collected:  06/30/20 1711    Specimen:  Blood Updated:  06/30/20 1713     Glucose 143 mg/dL     POC Glucose Once [[id number]]  (Normal) Collected:  06/30/20 1555    Specimen:  Blood Updated:  06/30/20 1556     Glucose 88 mg/dL     POC Glucose Once [[id number]]  (Normal) Collected:  06/30/20 1520    Specimen:  Blood Updated:  06/30/20 1521     Glucose 125 mg/dL     POC Glucose Once [[id number]]  (Abnormal) Collected:  06/30/20 1445    Specimen:  Blood Updated:  06/30/20 1446     Glucose 297 mg/dL     POC Glucose Once [[id number]]  (Abnormal) Collected:  06/30/20 1436     Specimen:  Blood Updated:  06/30/20 1439     Glucose 13 mg/dL     POC Glucose Once [[id number]]  (Abnormal) Collected:  06/30/20 1053    Specimen:  Blood Updated:  06/30/20 1055     Glucose 438 mg/dL     POC Glucose Once [[id number]]  (Abnormal) Collected:  06/30/20 0747    Specimen:  Blood Updated:  06/30/20 0750     Glucose 359 mg/dL     Basic Metabolic Panel [[id number]]  (Abnormal) Collected:  06/30/20 0533    Specimen:  Blood from Hand, Left Updated:  06/30/20 0640     Glucose 183 mg/dL      BUN 18 mg/dL      Creatinine 0.74 mg/dL      Sodium 133 mmol/L      Potassium 4.4 mmol/L      Chloride 99 mmol/L      CO2 25.4 mmol/L      Calcium 9.2 mg/dL      eGFR Non African Amer 111 mL/min/1.73      BUN/Creatinine Ratio 24.3     Anion Gap 8.6 mmol/L     Narrative:       GFR Normal >60  Chronic Kidney Disease <60  Kidney Failure <15      CBC (No Diff) [[id number]]  (Abnormal) Collected:  06/30/20 0533    Specimen:  Blood from Hand, Left Updated:  06/30/20 0615     WBC 3.68 10*3/mm3      RBC 3.95 10*6/mm3      Hemoglobin 11.6 g/dL      Hematocrit 34.2 %      MCV 86.6 fL      MCH 29.4 pg      MCHC 33.9 g/dL      RDW 14.9 %      RDW-SD 46.0 fl      MPV 11.2 fL      Platelets 319 10*3/mm3     POC Glucose Once [[id number]]  (Abnormal) Collected:  06/30/20 0305    Specimen:  Blood Updated:  06/30/20 0307     Glucose 170 mg/dL     POC Glucose Once [[id number]]  (Abnormal) Collected:  06/29/20 2358    Specimen:  Blood Updated:  06/30/20 0001     Glucose 222 mg/dL     Anti-islet Cell Antibody [[id number]] Collected:  06/26/20 0524    Specimen:  Blood Updated:  06/29/20 1808     Islet Cell Ab Negative    Narrative:       Performed at:  [address] [place] [address]  [id number]  : Kim Dietz MD, Phone:  [phone]    POC Glucose Once [[id number]]  (Abnormal) Collected:  06/29/20 1559    Specimen:  Blood Updated:  06/29/20 1606     Glucose 200 mg/dL     POC Glucose Once [[id number]]  (Abnormal)  Collected:  06/29/20 1104    Specimen:  Blood Updated:  06/29/20 1113     Glucose 192 mg/dL     POC Glucose Once [[id number]]  (Abnormal) Collected:  06/29/20 0801    Specimen:  Blood Updated:  06/29/20 0802     Glucose 179 mg/dL     Basic Metabolic Panel [[id number]]  (Abnormal) Collected:  06/29/20 0403    Specimen:  Blood Updated:  06/29/20 0457     Glucose 172 mg/dL      BUN 15 mg/dL      Creatinine 0.62 mg/dL      Sodium 127 mmol/L      Potassium 4.1 mmol/L      Chloride 95 mmol/L      CO2 25.6 mmol/L      Calcium 8.9 mg/dL      eGFR Non African Amer 136 mL/min/1.73      BUN/Creatinine Ratio 24.2     Anion Gap 6.4 mmol/L     Narrative:       GFR Normal >60  Chronic Kidney Disease <60  Kidney Failure <15      CBC (No Diff) [[id number]]  (Abnormal) Collected:  06/29/20 0403    Specimen:  Blood Updated:  06/29/20 0433     WBC 3.81 10*3/mm3      RBC 4.09 10*6/mm3      Hemoglobin 11.9 g/dL      Hematocrit 35.7 %      MCV 87.3 fL      MCH 29.1 pg      MCHC 33.3 g/dL      RDW 15.1 %      RDW-SD 48.5 fl      MPV 11.1 fL      Platelets 340 10*3/mm3     POC Glucose Once [[id number]]  (Abnormal) Collected:  06/29/20 0404    Specimen:  Blood Updated:  06/29/20 0405     Glucose 173 mg/dL     POC Glucose Once [[id number]]  (Abnormal) Collected:  06/28/20 2112    Specimen:  Blood Updated:  06/28/20 2114     Glucose 167 mg/dL     POC Glucose Once [[id number]]  (Abnormal) Collected:  06/28/20 1556    Specimen:  Blood Updated:  06/28/20 1558     Glucose 140 mg/dL     T4, Free [[id number]]  (Normal) Collected:  06/28/20 1054    Specimen:  Blood from Hand, Right Updated:  06/28/20 1206     Free T4 0.99 ng/dL     Narrative:       Results may be falsely increased if patient taking Biotin.      T3, Free [[id number]]  (Abnormal) Collected:  06/28/20 1054    Specimen:  Blood from Hand, Right Updated:  06/28/20 1206     T3, Free 1.81 pg/mL     Narrative:       Results may be falsely increased if patient taking Biotin.          Imaging Results  (Last 72 Hours)     Procedure Component Value Units Date/Time    XR Abdomen KUB [[id number]] Collected:  06/30/20 1438     Updated:  06/30/20 1442    Narrative:       XR ABDOMEN KUB-     INDICATIONS: NG tube placement     TECHNIQUE: Supine views of the abdomen     COMPARISON: 06/16/2020     FINDINGS:     NG tube appears to extend to the duodenal jejunal junction region      The bowel gas pattern is nonspecific. No supine evidence for free  intraperitoneal gas.     Follow-up as clinically indicated.             Impression:          As described.     This report was finalized on 6/30/2020 2:39 PM by Dr. Johnny Springer M.D.             Medication Review:       Current Facility-Administered Medications:   •  acetaminophen (TYLENOL) tablet 650 mg, 650 mg, Nasogastric, Q4H PRN **OR** acetaminophen (TYLENOL) suppository 650 mg, 650 mg, Rectal, Q4H PRN, Satnam Lewis MD  •  atorvastatin (LIPITOR) tablet 10 mg, 10 mg, Oral, Daily, Satnam Lewis MD, 10 mg at 07/01/20 0828  •  brimonidine (ALPHAGAN) 0.15 % ophthalmic solution 1 drop, 1 drop, Both Eyes, BID, Satnam Lewis MD, 1 drop at 07/01/20 0828  •  dextrose (D50W) 25 g/ 50mL Intravenous Solution 25 g, 25 g, Intravenous, Q15 Min PRN, Satnam Lewis MD, 25 g at 06/30/20 1500  •  dextrose (GLUTOSE) oral gel 15 g, 15 g, Oral, Q15 Min PRN, Satnam Lewis MD  •  diphenhydrAMINE (BENADRYL) injection 50 mg, 50 mg, Intravenous, Once PRN, Satnam Lewis MD  •  enoxaparin (LOVENOX) syringe 40 mg, 40 mg, Subcutaneous, Q24H, Satnam Lewis MD, 40 mg at 06/30/20 1226  •  famotidine (PEPCID) injection 20 mg, 20 mg, Intravenous, Once PRN, Satnam Lewis MD  •  fentaNYL citrate (PF) (SUBLIMAZE) injection 100 mcg, 100 mcg, Intravenous, Q30 Min PRN, Satnam Lewis MD, 100 mcg at 06/26/20 0012  •  glucagon (human recombinant) (GLUCAGEN DIAGNOSTIC) injection 1 mg, 1 mg, Subcutaneous, Q15 Min PRN, Satnam Lewis MD  •   haloperidol lactate (HALDOL) injection 2 mg, 2 mg, Intravenous, Q4H PRN, Satnam Lewis MD, 2 mg at 06/29/20 0028  •  Hold medication, 1 each, Does not apply, Continuous PRN, Satnam Lewis MD  •  Hold medication, 1 each, Does not apply, Continuous PRN, Satnam Lewis MD  •  hydrocortisone sodium succinate (Solu-CORTEF) injection 100 mg, 100 mg, Intravenous, Once PRN, Satnam Lewis MD  •  insulin glargine (LANTUS) injection 20 Units, 20 Units, Subcutaneous, Q12H, Rodrigo Van MD, 20 Units at 07/01/20 0829  •  insulin lispro (humaLOG) injection 3-5 Units, 3-5 Units, Subcutaneous, Q4H, Rodrigo Van MD, 4 Units at 07/01/20 1014  •  insulin lispro (humaLOG) injection 4 Units, 4 Units, Subcutaneous, Q4H, Rodrigo Van MD  •  lacosamide (VIMPAT) tablet 100 mg, 100 mg, Oral, Q12H, Zac Lewis MD, 100 mg at 07/01/20 0828  •  latanoprost (XALATAN) 0.005 % ophthalmic solution 1 drop, 1 drop, Both Eyes, Nightly, Satnam Lewis MD, 1 drop at 06/30/20 2242  •  Levothyroxine Sodium injection 150 mcg, 150 mcg, Intravenous, Daily, Rodrigo Van MD, 150 mcg at 07/01/20 1014  •  melatonin tablet 5 mg, 5 mg, Oral, Nightly, Blayne Souza MD  •  metoprolol tartrate (LOPRESSOR) injection 5 mg, 5 mg, Intravenous, Q6H PRN, Satnam Lewis MD  •  nystatin (MYCOSTATIN) powder, , Topical, Q12H, Satnam Lewis MD  •  ondansetron (ZOFRAN) injection 4 mg, 4 mg, Intravenous, Q6H PRN, Satnam Lewis MD  •  potassium chloride (MICRO-K) CR capsule 40 mEq, 40 mEq, Oral, PRN **OR** potassium chloride (KLOR-CON) packet 40 mEq, 40 mEq, Oral, PRN, 40 mEq at 06/17/20 0021 **OR** potassium chloride 10 mEq in 100 mL IVPB, 10 mEq, Intravenous, Q1H PRN, Satnam Lewis MD  •  potassium phosphate 45 mmol in sodium chloride 0.9 % 500 mL infusion, 45 mmol, Intravenous, PRN **OR** potassium phosphate 30 mmol in sodium chloride 0.9 % 250 mL infusion, 30  mmol, Intravenous, PRN, Last Rate: 31.3 mL/hr at 06/18/20 0635, 30 mmol at 06/18/20 0635 **OR** potassium phosphate 15 mmol in sodium chloride 0.9 % 100 mL infusion, 15 mmol, Intravenous, PRN **OR** sodium phosphates 40 mmol in sodium chloride 0.9 % 500 mL IVPB, 40 mmol, Intravenous, PRN **OR** sodium phosphates 20 mmol in sodium chloride 0.9 % 250 mL IVPB, 20 mmol, Intravenous, PRN, Satnam Lewis MD  •  risperiDONE (risperDAL) tablet 0.5 mg, 0.5 mg, Oral, Q12H, Lv Truong MD, 0.5 mg at 07/01/20 0827  •  sennosides-docusate (PERICOLACE) 8.6-50 MG per tablet 1 tablet, 1 tablet, Oral, BID PRN, Satnam Lewis MD  •  [COMPLETED] Insert peripheral IV, , , Once **AND** sodium chloride 0.9 % flush 10 mL, 10 mL, Intravenous, PRN, Satnam Lewis MD  •  sodium chloride 0.9 % flush 10 mL, 10 mL, Intravenous, Q12H, Satnam Lewis MD, 10 mL at 07/01/20 0829  •  sodium chloride 0.9 % flush 10 mL, 10 mL, Intravenous, PRN, Satnam Lewis MD  •  sodium chloride tablet 2 g, 2 g, Oral, BID With Meals, Blayne Souza MD, 2 g at 07/01/20 0828  •  timolol (TIMOPTIC) 0.25 % ophthalmic solution 1 drop, 1 drop, Both Eyes, Q12H, Satnam Lewis MD, 1 drop at 07/01/20 0828    Assessment/Plan     Patient Active Problem List   Diagnosis   • Type 2 diabetes mellitus without complication, with long-term current use of insulin (CMS/[facility])   • Type 2 diabetes mellitus without complication (CMS/[facility])   • Hypothyroidism   • Hyperlipidemia   • Hypertension   • Encephalopathy acute      Uncontrolled type 1 diabetes mellitus  C-peptide is very low at less than 0.1 with a blood sugar of 233 at the same time  Patient is deemed to be type I diabetic at this time  Hypothyroidism  Acute encephalopathy  Fluctuating blood sugars  Hypoglycemia yesterday    Patient has been experiencing both hyperglycemia and hypoglycemia in the past 48 hours  Apparently he is also allowed to eat orally in addition to tube  "feeding which is probably complicating the situation    I will continue the Lantus 25 units every 12 hours  I will change the correction scale 3 to 5 units  I will schedule Humalog 5 units 3 times daily before each meal and the dose will be held if the blood sugars less than 120    We will change the correction scale to a CHS as well  We will discontinue IV levothyroxine injection  We will transition him to oral medication at 175 mcg  Rodrigo Van MD FACE.  07/01/20  11:35 AM      EMR Dragon / transcription disclaimer:     \"Dictated utilizing Dragon dictation\".   "

## 2020-07-01 NOTE — PROGRESS NOTES
Continued Stay Note  [facility]     Patient Name: Anthony Yang  MRN: [id number]  Today's Date: 7/1/2020    Admit Date: 6/12/2020    Discharge Plan     Row Name 07/01/20 1343       Plan    Plan  [facility] vs [facility] Rehab    Plan Comments  Called Janelle/[facility] for referral outcome, she states they are evaluating and need to speak with Dr. Ibarra. Awaiting acute rehab outcome before determining subacute rehab referrals. [facility] has accepted. CCP to follow. Virginia hidalgo/ccp        Discharge Codes    No documentation.             Ana Bauer RN

## 2020-07-01 NOTE — PROGRESS NOTES
[facility]  [phone]  Blayne Souza MD    Subjective:  LOS: 19    Laying in bed.  Appears coherent and responsive today.  Working with physical therapy.  Difficulty sleeping at night.    Objective   Vital Signs past 24hrs    Temp range: Temp (24hrs), Av.2 °F (36.8 °C), Min:98.1 °F (36.7 °C), Max:98.3 °F (36.8 °C)    BP range: BP: (120-144)/(64-72) 144/72  Pulse range: Heart Rate:  [60-70] 60  Resp rate range: Resp:  [16-18] 18    Device (Oxygen Therapy): room air   Oxygen range:       97.5 kg (215 lb); Body mass index is 34.72 kg/m².  No intake or output data in the 24 hours ending 20    Physical Exam   Constitutional: He appears well-developed.   HENT:   Head: Normocephalic.   Cardiovascular: Normal rate and regular rhythm.   No murmur heard.  Pulmonary/Chest: He has no wheezes. He has no rales.   Abdominal: Soft. Bowel sounds are normal. There is no tenderness.   Musculoskeletal: He exhibits no edema.   Neurological:   Appears improved  Speech impaired   Nursing note and vitals reviewed.    Results Review:    I have reviewed the laboratory and imaging data since the last note by [facility] physician.  My annotations are noted in assessment and plan.    Medication Review:  I have reviewed the current MAR.  My annotations are noted in assessment and plan.      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  Completed day 5 of 5 of IVIG.  Also on Risperdal twice daily.  Will need rehab.  CCP is planning.  He seems to be better.      Remains on Vimpat for  seizure disorder.  Plan to continue on discharge.    Appreciate endocrine help in managing blood sugars.  They have deemed the patient as diabetic type I.  His insulin dosage was adjusted.    Continue levothyroxine per endocrine service.    Acute hyponatremia improved on Increased dose of sodium tablets.    Now on modified consistency diet.  Stop tube feeds but keep his cortrack.    Melatonin at night for insomnia.    Blayne Souza MD  07/01/20  18:56    Communication:  YOU MAY Epic MESSAGE ME ON IN-PATIENT CARE OF THIS PATIENT     BETWEEN THE HOURS OF 7 AM AND 4:30 PM ONLY AND NOT AFTER 7/3/20.      Part of this note may be an electronic transcription/translation of spoken language to printed text using the Dragon Dictation System.

## 2020-07-01 NOTE — PLAN OF CARE
Problem: Patient Care Overview  Goal: Plan of Care Review  Outcome: Ongoing (interventions implemented as appropriate)  Flowsheets (Taken 7/1/2020 1302)  Progress: improving  Plan of Care Reviewed With: patient  Outcome Summary: Pt improving w/ amb of 400' req SV/CGA and use of fww. Pt very quiet and responding w/ 1 word answers although following instructions when given on most occasions. PT will prog as pt jacque.     Patient was wearing a face mask during this therapy encounter. Therapist used appropriate personal protective equipment including mask and gloves.  Mask used was standard procedure mask. Appropriate PPE was worn during the entire therapy session. Hand hygiene was completed before and after therapy session. Patient is not in enhanced droplet precautions.

## 2020-07-01 NOTE — PROGRESS NOTES
[facility]  [phone]  Blayne Souza MD    Subjective:  LOS: 18    Laying in bed when I walked in.  Mumbles responses.  Seems perhaps a little less coherent than yesterday.    Objective   Vital Signs past 24hrs    Temp range: Temp (24hrs), Av °F (36.7 °C), Min:97.7 °F (36.5 °C), Max:98.3 °F (36.8 °C)    BP range: BP: (132-143)/(58-70) 143/70  Pulse range: Heart Rate:  [63-65] 64  Resp rate range: Resp:  [16-18] 16    Device (Oxygen Therapy): room air   Oxygen range:       97.7 kg (215 lb 6.4 oz); Body mass index is 34.78 kg/m².    Intake/Output Summary (Last 24 hours) at 2020  Last data filed at 2020 0400  Gross per 24 hour   Intake 40 ml   Output 300 ml   Net -260 ml       Physical Exam   Constitutional: He appears well-developed.   HENT:   Head: Normocephalic.   Cardiovascular: Normal rate and regular rhythm.   No murmur heard.  Pulmonary/Chest: He has no wheezes. He has no rales.   Abdominal: Soft. Bowel sounds are normal. There is no tenderness.   Musculoskeletal: He exhibits no edema.   Neurological:   Remains cognitively impaired  Speech impaired   Nursing note and vitals reviewed.    Results Review:    I have reviewed the laboratory and imaging data since the last note by [facility] physician.  My annotations are noted in assessment and plan.    Medication Review:  I have reviewed the current MAR.  My annotations are noted in assessment and plan.      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  Completed day 5 of 5 of IVIG.  Also on Risperdal  twice daily.  Will need rehab.  CCP is planning.  He seems a little less coherent today.  We will see how he does over the next 24 hours.  If not may need to call back neurology.    Remains on Vimpat for seizure disorder.  Plan to continue on discharge.    Appreciate endocrine help in managing blood sugars.  They have deemed the patient as diabetic type I.  His insulin dosage was adjusted.    Continue levothyroxine per endocrine service.    Acute hyponatremia improved on Increased dose of sodium tablets.    Now on modified consistency diet.    Blayne Souza MD  06/30/20  20:01    Communication:  YOU MAY Epic MESSAGE ME ON IN-PATIENT CARE OF THIS PATIENT     BETWEEN THE HOURS OF 7 AM AND 4:30 PM ONLY AND NOT AFTER 7/3/20.      Part of this note may be an electronic transcription/translation of spoken language to printed text using the Dragon Dictation System.

## 2020-07-02 LAB
ANION GAP SERPL CALCULATED.3IONS-SCNC: 5.8 MMOL/L (ref 5–15)
BUN SERPL-MCNC: 22 MG/DL (ref 6–20)
BUN/CREAT SERPL: 30.6 (ref 7–25)
CALCIUM SPEC-SCNC: 9.3 MG/DL (ref 8.6–10.5)
CHLORIDE SERPL-SCNC: 101 MMOL/L (ref 98–107)
CO2 SERPL-SCNC: 28.2 MMOL/L (ref 22–29)
CREAT SERPL-MCNC: 0.72 MG/DL (ref 0.76–1.27)
DEPRECATED RDW RBC AUTO: 47.8 FL (ref 37–54)
ERYTHROCYTE [DISTWIDTH] IN BLOOD BY AUTOMATED COUNT: 15.2 % (ref 12.3–15.4)
GFR SERPL CREATININE-BSD FRML MDRD: 114 ML/MIN/1.73
GLUCOSE BLDC GLUCOMTR-MCNC: 214 MG/DL (ref 70–130)
GLUCOSE BLDC GLUCOMTR-MCNC: 294 MG/DL (ref 70–130)
GLUCOSE BLDC GLUCOMTR-MCNC: 53 MG/DL (ref 70–130)
GLUCOSE BLDC GLUCOMTR-MCNC: 55 MG/DL (ref 70–130)
GLUCOSE BLDC GLUCOMTR-MCNC: 63 MG/DL (ref 70–130)
GLUCOSE BLDC GLUCOMTR-MCNC: 71 MG/DL (ref 70–130)
GLUCOSE BLDC GLUCOMTR-MCNC: 96 MG/DL (ref 70–130)
GLUCOSE SERPL-MCNC: 119 MG/DL (ref 65–99)
HCT VFR BLD AUTO: 34.2 % (ref 37.5–51)
HGB BLD-MCNC: 11.3 G/DL (ref 13–17.7)
MCH RBC QN AUTO: 28.6 PG (ref 26.6–33)
MCHC RBC AUTO-ENTMCNC: 33 G/DL (ref 31.5–35.7)
MCV RBC AUTO: 86.6 FL (ref 79–97)
PLATELET # BLD AUTO: 330 10*3/MM3 (ref 140–450)
PMV BLD AUTO: 11.3 FL (ref 6–12)
POTASSIUM SERPL-SCNC: 4.4 MMOL/L (ref 3.5–5.2)
RBC # BLD AUTO: 3.95 10*6/MM3 (ref 4.14–5.8)
SODIUM SERPL-SCNC: 135 MMOL/L (ref 136–145)
WBC # BLD AUTO: 5 10*3/MM3 (ref 3.4–10.8)

## 2020-07-02 PROCEDURE — 63710000001 INSULIN GLARGINE PER 5 UNITS: Performed by: INTERNAL MEDICINE

## 2020-07-02 PROCEDURE — 63710000001 INSULIN LISPRO (HUMAN) PER 5 UNITS: Performed by: INTERNAL MEDICINE

## 2020-07-02 PROCEDURE — 85027 COMPLETE CBC AUTOMATED: CPT | Performed by: INTERNAL MEDICINE

## 2020-07-02 PROCEDURE — C9803 HOPD COVID-19 SPEC COLLECT: HCPCS | Performed by: INTERNAL MEDICINE

## 2020-07-02 PROCEDURE — 80048 BASIC METABOLIC PNL TOTAL CA: CPT | Performed by: INTERNAL MEDICINE

## 2020-07-02 PROCEDURE — 97110 THERAPEUTIC EXERCISES: CPT | Performed by: OCCUPATIONAL THERAPIST

## 2020-07-02 PROCEDURE — 99232 SBSQ HOSP IP/OBS MODERATE 35: CPT | Performed by: INTERNAL MEDICINE

## 2020-07-02 PROCEDURE — 82962 GLUCOSE BLOOD TEST: CPT

## 2020-07-02 PROCEDURE — 92526 ORAL FUNCTION THERAPY: CPT

## 2020-07-02 PROCEDURE — U0004 COV-19 TEST NON-CDC HGH THRU: HCPCS | Performed by: INTERNAL MEDICINE

## 2020-07-02 PROCEDURE — 25010000002 ENOXAPARIN PER 10 MG: Performed by: INTERNAL MEDICINE

## 2020-07-02 RX ORDER — INSULIN GLARGINE 100 [IU]/ML
20 INJECTION, SOLUTION SUBCUTANEOUS EVERY 12 HOURS SCHEDULED
Status: DISCONTINUED | OUTPATIENT
Start: 2020-07-02 | End: 2020-07-03

## 2020-07-02 RX ADMIN — INSULIN GLARGINE 20 UNITS: 100 INJECTION, SOLUTION SUBCUTANEOUS at 21:57

## 2020-07-02 RX ADMIN — INSULIN GLARGINE 25 UNITS: 100 INJECTION, SOLUTION SUBCUTANEOUS at 08:45

## 2020-07-02 RX ADMIN — LACOSAMIDE 100 MG: 100 TABLET, FILM COATED ORAL at 20:17

## 2020-07-02 RX ADMIN — INSULIN LISPRO 4 UNITS: 100 INJECTION, SOLUTION INTRAVENOUS; SUBCUTANEOUS at 11:48

## 2020-07-02 RX ADMIN — SODIUM CHLORIDE TAB 1 GM 2 G: 1 TAB at 08:44

## 2020-07-02 RX ADMIN — ENOXAPARIN SODIUM 40 MG: 40 INJECTION SUBCUTANEOUS at 11:48

## 2020-07-02 RX ADMIN — NYSTATIN: 100000 POWDER TOPICAL at 08:45

## 2020-07-02 RX ADMIN — INSULIN LISPRO 5 UNITS: 100 INJECTION, SOLUTION INTRAVENOUS; SUBCUTANEOUS at 08:45

## 2020-07-02 RX ADMIN — SODIUM CHLORIDE, PRESERVATIVE FREE 10 ML: 5 INJECTION INTRAVENOUS at 08:45

## 2020-07-02 RX ADMIN — LACOSAMIDE 100 MG: 100 TABLET, FILM COATED ORAL at 08:45

## 2020-07-02 RX ADMIN — RISPERIDONE 0.5 MG: 0.5 TABLET, FILM COATED ORAL at 20:15

## 2020-07-02 RX ADMIN — BRIMONIDINE TARTRATE 1 DROP: 1.5 SOLUTION OPHTHALMIC at 08:46

## 2020-07-02 RX ADMIN — TIMOLOL MALEATE 1 DROP: 2.5 SOLUTION/ DROPS OPHTHALMIC at 08:46

## 2020-07-02 RX ADMIN — INSULIN LISPRO 2 UNITS: 100 INJECTION, SOLUTION INTRAVENOUS; SUBCUTANEOUS at 21:57

## 2020-07-02 RX ADMIN — LATANOPROST 1 DROP: 50 SOLUTION OPHTHALMIC at 20:15

## 2020-07-02 RX ADMIN — ATORVASTATIN CALCIUM 10 MG: 20 TABLET, FILM COATED ORAL at 08:45

## 2020-07-02 RX ADMIN — Medication 5 MG: at 20:15

## 2020-07-02 RX ADMIN — BRIMONIDINE TARTRATE 1 DROP: 1.5 SOLUTION OPHTHALMIC at 20:15

## 2020-07-02 RX ADMIN — NYSTATIN: 100000 POWDER TOPICAL at 20:15

## 2020-07-02 RX ADMIN — RISPERIDONE 0.5 MG: 0.5 TABLET, FILM COATED ORAL at 08:45

## 2020-07-02 RX ADMIN — SODIUM CHLORIDE TAB 1 GM 2 G: 1 TAB at 18:07

## 2020-07-02 RX ADMIN — SODIUM CHLORIDE, PRESERVATIVE FREE 10 ML: 5 INJECTION INTRAVENOUS at 20:15

## 2020-07-02 RX ADMIN — LEVOTHYROXINE SODIUM 175 MCG: 175 TABLET ORAL at 06:08

## 2020-07-02 RX ADMIN — INSULIN LISPRO 5 UNITS: 100 INJECTION, SOLUTION INTRAVENOUS; SUBCUTANEOUS at 11:49

## 2020-07-02 RX ADMIN — TIMOLOL MALEATE 1 DROP: 2.5 SOLUTION/ DROPS OPHTHALMIC at 20:15

## 2020-07-02 NOTE — PLAN OF CARE
Problem: Patient Care Overview  Goal: Plan of Care Review  Flowsheets (Taken 7/2/2020 3674)  Progress: improving  Plan of Care Reviewed With: patient  Outcome Summary: pt completed bed mobility min A. sit to stand CGA and to chair CGA. pt completed B UE ex AROM 10x2. Pt already washed up this date. Cont OT to incr ADL, strength, balance, and tsf.

## 2020-07-02 NOTE — PROGRESS NOTES
Continued Stay Note  [facility]     Patient Name: Anthony Yang  MRN: [id number]  Today's Date: 7/2/2020    Admit Date: 6/12/2020    Discharge Plan     Row Name 07/02/20 1531       Plan    Plan  [facility] pending covid19 and 30 day signed    Patient/Family in Agreement with Plan  yes    Plan Comments  Spoke with Beatriz/[facility], per Dr. Ibarra, pt more appropriate for subacute rehab. CCP spoke with pt and he states [facility] is 1st choice. Confirmed with pts brother via outbound call. Notified Julia/[facility] she states bed available tomorrow, pending Negative covid19 test and signed 30 day. CCP spoke with Dr. Souza, COVID19 test ordered and 30 day placed in [facility] for MD signature. Ana hidalgo/ccp        Discharge Codes    No documentation.       Expected Discharge Date and Time     Expected Discharge Date Expected Discharge Time    Jul 3, 2020             Ana Bauer, RN

## 2020-07-02 NOTE — THERAPY RE-EVALUATION
Acute Care - Speech Language Pathology   Swallow Re-Evaluation [facility]     Patient Name: Anthony Yang  : 1965  MRN: [id number]  Today's Date: 2020               Admit Date: 2020    Visit Dx:     ICD-10-CM ICD-9-CM   1. Encephalopathy acute G93.40 348.30   2. Leukocytosis, unspecified type D72.829 288.60   3. Elevated procalcitonin R79.89 790.99   4. Seizure (CMS/HCC) R56.9 780.39     Patient Active Problem List   Diagnosis   • Type 2 diabetes mellitus without complication, with long-term current use of insulin (CMS/HCC)   • Type 2 diabetes mellitus without complication (CMS/HCC)   • Hypothyroidism   • Hyperlipidemia   • Hypertension   • Encephalopathy acute     Past Medical History:   Diagnosis Date   • Cataract    • Diabetes mellitus (CMS/HCC)     type 1   • Glaucoma    • Neuropathy    • Seizures (CMS/HCC)      Past Surgical History:   Procedure Laterality Date   • EYE SURGERY     • JOINT REPLACEMENT          SWALLOW EVALUATION (last 72 hours)      SLP Adult Swallow Evaluation     Row Name 20 0958 20 1200                Rehab Evaluation    Document Type  re-evaluation  -LN  re-evaluation  -AW       Subjective Information  no complaints  -LN  no complaints  -AW       Patient Observations  alert;cooperative  -LN  alert;cooperative;agree to therapy  -AW       Patient Effort  good  -LN  good  -AW       Symptoms Noted During/After Treatment  none  -LN  none  -AW          General Information    Patient Profile Reviewed  yes  -LN  yes  -AW       Pertinent History Of Current Problem  encephalopathy, 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, West Nile virus, respiratory failure (w/ intubation); h/o seizure d/o, legally blind  -LN  encephalopathy, 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, West Nile virus, respiratory failure (w/ intubation); h/o seizure d/o, legally blind  -AW       Current Method of Nutrition  pureed with some mashed;honey-thick liquids  -LN  NPO;nasogastric feedings  -AW       Precautions/Limitations, Vision   WFL with corrective lenses  -LN  vision impairment, bilaterally;other (see comments) legally blind  -AW       Precautions/Limitations, Hearing  WFL;for purposes of eval  -LN  WFL  -AW       Prior Level of Function-Communication  --  other (see comments) Pt lived I'ly, question cognitive deficits  -AW       Prior Level of Function-Swallowing  --  no diet consistency restrictions  -AW       Plans/Goals Discussed with  patient;agreed upon  -LN  patient;agreed upon  -AW       Barriers to Rehab  cognitive status  -LN  medically complex  -AW       Patient's Goals for Discharge  --  patient did not state  -AW          Pain Assessment    Additional Documentation  --  Pain Scale: Numbers Pre/Post-Treatment (Group)  -AW          Pain Scale: Numbers Pre/Post-Treatment    Pain Scale: Numbers, Pretreatment  0/10 - no pain  -LN  0/10 - no pain  -AW       Pain Scale: Numbers, Post-Treatment  0/10 - no pain  -LN  0/10 - no pain  -AW          Oral Motor and Function    Dentition Assessment  natural, present and adequate  -LN  natural, present and adequate  -AW       Secretion Management  WNL/WFL  -LN  wet vocal quality;other (see comments) inconsistent wet voice prior to eval  -AW       Mucosal Quality  moist, healthy  -LN  moist, healthy  -AW       Volitional Swallow  --  unable to elicit  -AW       Volitional Cough  --  weak  -AW          Oral Musculature and Cranial Nerve Assessment    Oral Motor General Assessment  generalized oral motor weakness  -LN  generalized oral motor weakness  -AW       Oral Motor, Comment  --  minimal mouth opening and lingual protrusion  -AW          General Eating/Swallowing Observations    Respiratory Support Currently in Use  room air  -LN  --       Eating/Swallowing Skills  self-fed;requires cueing for compensatory strategies and precautions  -LN  fed by SLP  -AW       Positioning During Eating  upright 90 degree;upright in bed  -LN  upright in chair  -AW       Utensils Used  spoon;cup  -LN   spoon;cup  -AW       Consistencies Trialed  pureed with some mashed;thin liquids;nectar/syrup-thick liquids;honey-thick liquids  -LN  thin liquids;nectar/syrup-thick liquids;honey-thick liquids;pureed;mechanical soft, no mixed consistencies  -AW          Clinical Swallow Eval    Oral Transit  --  impaired  -AW       Oral Residue  --  impaired  -AW       Pharyngeal Phase  --  suspected pharyngeal impairment  -AW       Clinical Swallow Evaluation Summary  Swallow re-evaluation this date. Pt given trials of puree with some mashed, thins (water was initially presented), NTL via cup, and HTL via cup. Pt with Cortrak in place. Mastication of fork mashed consistency was slow but adequate. Wet vocal quality noted on x2/5 trials of water by cup, though no clinical s/s of aspiration with NTL or HTL trials. Vocal quality also remained clear post swallow following NTL and HTL trials. Laryngeal elevation was reduced upon palpation and delayed swallow initiation noted with all consistencies. Recommend continuation of fork mashed diet but an uprgrade to NTL. Continue recommendations for no straws. Oral meds can be taken crushed in puree, as tolerated.   -LN  Swallow reevaluated. Oral care completed prior to eval. Pt with Cortrak in place, appeared to have pulled it out some, reported to RN. Pt tolerated ice chips and was able to initiate a swallow. Swallow appeared delayed with reduced laryngeal elevation. Wet vocal quality was noted with cup sips of thin and NTL. No s/s with cup sips of HTL and pureed trials. Mastication was slow with soft solids. No oral residue was observed. Feel pt is safe for dysphagia diet of fork mashed and HTL.  -AW          Clinical Impression    SLP Swallowing Diagnosis  mild;oral dysfunction;suspected pharyngeal dysfunction  -LN  oral dysfunction;suspected pharyngeal dysfunction  -AW       Functional Impact  risk of aspiration/pneumonia  -LN  risk of aspiration/pneumonia  -AW       Rehab  Potential/Prognosis, Swallowing  good, to achieve stated therapy goals  -LN  good, to achieve stated therapy goals  -AW       Swallow Criteria for Skilled Therapeutic Interventions Met  demonstrates skilled criteria  -LN  demonstrates skilled criteria  -AW          Recommendations    Therapy Frequency (Swallow)  PRN  -LN  PRN  -AW       Predicted Duration Therapy Intervention (Days)  until discharge  -LN  until discharge  -AW       SLP Diet Recommendation  puree with some mashed;nectar thick liquids  -LN  puree with some mashed;honey thick liquids  -AW       Recommended Diagnostics  reassess via clinical swallow evaluation  -LN  other (see comments) cog eval  -AW       Recommended Precautions and Strategies  upright posture during/after eating;small bites of food and sips of liquid;no straw;check mouth frequently for oral residue/pocketing  -LN  upright posture during/after eating;no straw;small bites of food and sips of liquid;check mouth frequently for oral residue/pocketing  -AW       SLP Rec. for Method of Medication Administration  meds crushed;with pudding or applesauce;as tolerated  -LN  meds crushed;with pudding or applesauce;as tolerated  -AW       Monitor for Signs of Aspiration  yes;gurgly voice;throat clearing;cough;notify SLP if any concerns  -LN  yes  -AW       Anticipated Dischage Disposition (SLP)  --  unknown;other (see comments)  -AW          Swallow Goals (SLP)    Oral Nutrition/Hydration Goal Selection (SLP)  oral nutrition/hydration, SLP goal 1  -LN  --          Oral Nutrition/Hydration Goal 1 (SLP)    Oral Nutrition/Hydration Goal 1, SLP  Pt will tolerate least restrictive diet  -LN  --       Time Frame (Oral Nutrition/Hydration Goal 1, SLP)  by discharge  -LN  --       Progress/Outcomes (Oral Nutrition/Hydration Goal 1, SLP)  good progress toward goal  -LN  --         User Key  (r) = Recorded By, (t) = Taken By, (c) = Cosigned By    Initials Name Effective Dates    Eve Olivier, MS CCC-SLP  06/08/18 -     LN DaltonDiana 12/17/19 -           EDUCATION  The patient has been educated in the following areas:   Dysphagia (Swallowing Impairment) Oral Care/Hydration.    SLP Recommendation and Plan  SLP Swallowing Diagnosis: mild, oral dysfunction, suspected pharyngeal dysfunction  SLP Diet Recommendation: puree with some mashed, nectar thick liquids  Recommended Precautions and Strategies: upright posture during/after eating, small bites of food and sips of liquid, no straw, check mouth frequently for oral residue/pocketing  SLP Rec. for Method of Medication Administration: meds crushed, with pudding or applesauce, as tolerated     Monitor for Signs of Aspiration: yes, gurgly voice, throat clearing, cough, notify SLP if any concerns  Recommended Diagnostics: reassess via clinical swallow evaluation  Swallow Criteria for Skilled Therapeutic Interventions Met: demonstrates skilled criteria     Rehab Potential/Prognosis, Swallowing: good, to achieve stated therapy goals  Therapy Frequency (Swallow): PRN  Predicted Duration Therapy Intervention (Days): until discharge       Plan of Care Reviewed With: patient  Outcome Summary: Swallow re-evaluation this date. Pt given trials of puree with some mashed, thins (water was initially presented), NTL via cup, and HTL via cup. Pt with Cortrak in place. Mastication of fork mashed consistency was slow but adequate. Wet vocal quality noted on x2/5 trials of water by cup, though no clinical s/s of aspiration with NTL or HTL trials. Vocal quality also remained clear, post swallow with NTL and HTL trials. Laryngeal elevation was reduced upon palpation and delayed swallow initiation noted with all consistencies. Recommend continuation of fork mashed diet but an uprgrade to NTL. Continue recommendations for no straws. Oral meds can be taken crushed in puree, as tolerated.    SLP GOALS     Row Name 07/02/20 0958             Oral Nutrition/Hydration Goal 1 (SLP)    Oral  Nutrition/Hydration Goal 1, SLP  Pt will tolerate least restrictive diet  -LN      Time Frame (Oral Nutrition/Hydration Goal 1, SLP)  by discharge  -LN      Progress/Outcomes (Oral Nutrition/Hydration Goal 1, SLP)  good progress toward goal  -LN        User Key  (r) = Recorded By, (t) = Taken By, (c) = Cosigned By    Initials Name Provider Type    Diana Mcginnis Speech and Language Pathologist           SLP Outcome Measures (last 72 hours)      SLP Outcome Measures     Row Name 06/30/20 1300             SLP Outcome Measures    Outcome Measure Used?  Adult NOMS  -AW         Adult FCM Scores    FCM Chosen  Swallowing  -AW      Swallowing FCM Score  3  -AW        User Key  (r) = Recorded By, (t) = Taken By, (c) = Cosigned By    Initials Name Effective Dates    AW Eve Vera, MS CCC-SLP 06/08/18 -            Time Calculation:   Time Calculation- SLP     Row Name 07/02/20 1311             Time Calculation- SLP    SLP Start Time  1016  -LN      SLP Received On  07/02/20  -LN        User Key  (r) = Recorded By, (t) = Taken By, (c) = Cosigned By    Initials Name Provider Type    Diana Mcginnis Speech and Language Pathologist          Therapy Charges for Today     Code Description Service Date Service Provider Modifiers Qty    82098220325 HC ST TREATMENT SWALLOW 3 7/2/2020 Diana Hoffman GN 1               Diana Hoffman  7/2/2020

## 2020-07-02 NOTE — PLAN OF CARE
Problem: Patient Care Overview  Goal: Plan of Care Review  Outcome: Ongoing (interventions implemented as appropriate)  Flowsheets (Taken 7/2/2020 1018)  Plan of Care Reviewed With: patient  Outcome Summary: Swallow re-evaluated this date. Pt given trials of puree with some mashed, thins (water was initially presented), NTL via cup, and HTL via cup. Pt with Cortrak in place. Mastication of fork mashed consistency was slow but adequate. Wet vocal quality noted on x2/5 trials of water by cup, though no clinical s/s of aspiration with NTL or HTL trials. Vocal quality also remained clear, post swallow with NTL and HTL trials. Laryngeal elevation was reduced upon palpation and delayed swallow initiation noted with all consistencies. Recommend continuation of fork mashed diet but an uprgrade to NTL. Continue recommendations for no straws. Oral meds can be taken crushed in puree, as tolerated. ST to f/u for diet tolerance and and evaluation of cognition.

## 2020-07-02 NOTE — PROGRESS NOTES
Continued Stay Note  [facility]     Patient Name: Anthony Yang  MRN: [id number]  Today's Date: 7/2/2020    Admit Date: 6/12/2020    Discharge Plan     Row Name 07/02/20 1051       Plan    Plan  [facility] vs [facility] vs [facility]- pending    Patient/Family in Agreement with Plan  yes    Plan Comments  Spoke with Shauna and she states unsure if pt will meet acute rehab but she is awaiting Dr. Ibarra review. Will call CCP once determination known. CCP spoke with Pt and brother Winston at bedside, updated on referrals for subacute and Accepting SNF [facility], [facility] and [facility]. They will review and let CCP know. Awaiting [facility] final outcome. Partial packet in ccp office. Virginia hidalgo/ccp        Discharge Codes    No documentation.             Ana Bauer RN

## 2020-07-02 NOTE — PLAN OF CARE
VSS. Alert to self and place. Ambulated in choi. Cortrack removed per Dr. Souza. Upgraded diet from Honey thick liquids to [place] thick liquids. Evening blood sugar was low and treated with Orange juice and peanut butter per protocol and resulted in improved blood glucose level (evening scheduled Humalog held per nursing judgement). Lantis and sliding scale decreased per Endocrine MD. Covid test sent to lab to clear patient for hopeful discharge to rehab tomorrow. Will CTM.

## 2020-07-02 NOTE — THERAPY TREATMENT NOTE
Acute Care - Occupational Therapy Treatment Note  [facility]     Patient Name: Anthony Yang  : 1965  MRN: [id number]  Today's Date: 2020             Admit Date: 2020       ICD-10-CM ICD-9-CM   1. Encephalopathy acute G93.40 348.30   2. Leukocytosis, unspecified type D72.829 288.60   3. Elevated procalcitonin R79.89 790.99   4. Seizure (CMS/HCC) R56.9 780.39     Patient Active Problem List   Diagnosis   • Type 2 diabetes mellitus without complication, with long-term current use of insulin (CMS/HCC)   • Type 2 diabetes mellitus without complication (CMS/HCC)   • Hypothyroidism   • Hyperlipidemia   • Hypertension   • Encephalopathy acute     Past Medical History:   Diagnosis Date   • Cataract    • Diabetes mellitus (CMS/HCC)     type 1   • Glaucoma    • Neuropathy    • Seizures (CMS/HCC)      Past Surgical History:   Procedure Laterality Date   • EYE SURGERY     • JOINT REPLACEMENT         Therapy Treatment    Rehabilitation Treatment Summary     Row Name 20 1536             Treatment Time/Intention    Discipline  occupational therapist  -      Document Type  therapy note (daily note)  -      Subjective Information  no complaints  -      Mode of Treatment  individual therapy;occupational therapy  -      Patient/Family Observations  pt supine in bed  -      Comment  OT wore mask and gloves, pt wore mask. OT completed hand hygiene  -      Existing Precautions/Restrictions  fall;seizures  -      Recorded by [] Randee Escalona, OTR 20 1539      Row Name 20 1536             Cognitive Assessment/Intervention- PT/OT    Orientation Status (Cognition)  oriented to;person;place;time  -KP      Follows Commands (Cognition)  follows one step commands;over 90% accuracy;verbal cues/prompting required  -      Recorded by [] Randee Escalona OTR 20 1539      Row Name 20 1536             Bed Mobility Assessment/Treatment    [place] Level (Bed  Mobility)  set up;minimum assist (75% patient effort)  -KP      Supine-Sit [place] (Bed Mobility)  not tested  -KP      Recorded by [KP] Randee Escalona OTR 07/02/20 1539      Row Name 07/02/20 1536             Functional Mobility    Functional Mobility- Ind. Level  set up required;minimum assist (75% patient effort)  -KP      Functional Mobility- Comment  to chair  -KP      Recorded by [KP] Randee Escalona OTR 07/02/20 1539      Row Name 07/02/20 1536             Bed-Chair Transfer    Bed-Chair [place] (Transfers)  set up;verbal cues;contact guard  -KP      Recorded by [KP] Randee Escalona OTR 07/02/20 1539      Row Name 07/02/20 1536             Sit-Stand Transfer    Sit-Stand [place] (Transfers)  set up;contact guard;verbal cues  -KP      Recorded by [KP] Randee Escalona OTR 07/02/20 1539      Row Name 07/02/20 1536             Stand-Sit Transfer    Stand-Sit [place] (Transfers)  set up;contact guard  -KP      Recorded by [KP] Randee Escalona OTR 07/02/20 1539      Row Name 07/02/20 1536             Upper Body Dressing Assessment/Training    Comment (Upper Body Dressing)  pt already washed up  -KP      Recorded by [KP] Randee Escalona OTR 07/02/20 1539      Row Name 07/02/20 1536             Therapeutic Exercise    Upper Extremity Range of Motion (Therapeutic Exercise)  shoulder flexion/extension, right;shoulder flexion/extension, left;forearm supination/pronation, left;forearm supination/pronation, right;elbow flexion/extension, right;elbow flexion/extension, left;other (see comments) chest press  -KP      Exercise Type (Therapeutic Exercise)  AROM (active range of motion)  -KP      Position (Therapeutic Exercise)  seated  -KP      Sets/Reps (Therapeutic Exercise)  10x2  -KP      Recorded by [KP] Randee Escalona OTR 07/02/20 1539      Row Name 07/02/20 1536             Static Sitting Balance    Level of [place] (Unsupported Sitting,  Static Balance)  supervision  -KP      Sitting Position (Unsupported Sitting, Static Balance)  sitting on edge of bed;sitting in chair  -KP      Time Able to Maintain Position (Unsupported Sitting, Static Balance)  more than 5 minutes  -KP      Recorded by [] Randee Escalona, OTR 07/02/20 1539      Row Name 07/02/20 1536             Static Standing Balance    Level of [place] (Supported Standing, Static Balance)  contact guard assist  -KP      Time Able to Maintain Position (Supported Standing, Static Balance)  less than 15 seconds  -KP      Recorded by [] Randee Escalona, OTR 07/02/20 1539      Row Name 07/02/20 1536             Positioning and Restraints    Pre-Treatment Position  in bed  -KP      Post Treatment Position  chair  -KP      In Chair  call light within reach;encouraged to call for assist;exit alarm on;reclined  -KP      Recorded by [] Randee Escalona, OTR 07/02/20 1539      Row Name 07/02/20 1536             Pain Scale: Numbers Pre/Post-Treatment    Pain Scale: Numbers, Pretreatment  0/10 - no pain  -KP      Pain Scale: Numbers, Post-Treatment  0/10 - no pain  -KP      Recorded by [] Randee Escalona, OTR 07/02/20 1539      Row Name 07/02/20 1536             Plan of Care Review    Plan of Care Reviewed With  patient  -      Progress  improving  -      Outcome Summary  pt completed bed mobility min A. sit to stand CGA and to chair CGA. pt completed B UE ex AROM 10x2. Pt already washed up this date. Cont OT to incr ADL, strength, balance, and tsf.  -KP      Recorded by [] Randee Escalona, OTR 07/02/20 1539      Row Name 07/02/20 1536             Outcome Summary/Treatment Plan (OT)    Daily Summary of Progress (OT)  progress toward functional goals is good  -KP      Recorded by [] Randee Escalona, OTR 07/02/20 1539        User Key  (r) = Recorded By, (t) = Taken By, (c) = Cosigned By    Initials Name Effective Dates Discipline     Iqra  Randee Gonzalez, OTR 06/08/18 -  OT           Rehab Goal Summary     Row Name 07/02/20 0958             Swallow Goals (SLP)    Oral Nutrition/Hydration Goal Selection (SLP)  oral nutrition/hydration, SLP goal 1  -LN         Oral Nutrition/Hydration Goal 1 (SLP)    Oral Nutrition/Hydration Goal 1, SLP  Pt will tolerate least restrictive diet  -LN      Time Frame (Oral Nutrition/Hydration Goal 1, SLP)  by discharge  -LN      Progress/Outcomes (Oral Nutrition/Hydration Goal 1, SLP)  good progress toward goal  -LN        User Key  (r) = Recorded By, (t) = Taken By, (c) = Cosigned By    Initials Name Provider Type Discipline    Diana Mcginnis Speech and Language Pathologist SLP        Occupational Therapy Education                 Title: PT OT SLP Therapies (In Progress)     Topic: Occupational Therapy (Not Started)     Point: ADL training (Not Started)     Description:   Instruct learner(s) on proper safety adaptation and remediation techniques during self care or transfers.   Instruct in proper use of assistive devices.              Learner Progress:   Not documented in this visit.          Point: Home exercise program (Not Started)     Description:   Instruct learner(s) on appropriate technique for monitoring, assisting and/or progressing therapeutic exercises/activities.              Learner Progress:   Not documented in this visit.          Point: Precautions (Not Started)     Description:   Instruct learner(s) on prescribed precautions during self-care and functional transfers.              Learner Progress:   Not documented in this visit.          Point: Body mechanics (Not Started)     Description:   Instruct learner(s) on proper positioning and spine alignment during self-care, functional mobility activities and/or exercises.              Learner Progress:   Not documented in this visit.                            OT Recommendation and Plan  Outcome Summary/Treatment Plan (OT)  Daily Summary of Progress  (OT): progress toward functional goals is good  Daily Summary of Progress (OT): progress toward functional goals is good  Plan of Care Review  Plan of Care Reviewed With: patient  Plan of Care Reviewed With: patient  Outcome Summary: pt completed bed mobility min A. sit to stand CGA and to chair CGA. pt completed B UE ex AROM 10x2. Pt already washed up this date. Cont OT to incr ADL, strength, balance, and tsf.  Outcome Measures     Row Name 07/02/20 1540 07/01/20 1520 06/30/20 1100       How much help from another is currently needed...    Putting on and taking off regular lower body clothing?  2  -KP  2  -SG  2  -SK    Bathing (including washing, rinsing, and drying)  2  -  2  -SG  2  -SK    Toileting (which includes using toilet bed pan or urinal)  2  -  2  -SG  2  -SK    Putting on and taking off regular upper body clothing  2  -  2  -SG  2  -SK    Taking care of personal grooming (such as brushing teeth)  2  -  2  -SG  3  -SK    Eating meals  3  -  1  -SG  1  -SK    AM-PAC 6 Clicks Score (OT)  13  -  11  -SG  12  -SK       Functional Assessment    Outcome Measure Options  AM-PAC 6 Clicks Daily Activity (OT)  -  --  --      User Key  (r) = Recorded By, (t) = Taken By, (c) = Cosigned By    Initials Name Provider Type    Lizzy Dawn OTR Occupational Therapist    Randee Hutchins, OTR Occupational Therapist    SK Katie Chavez, OT Occupational Therapist           Time Calculation:   Time Calculation- OT     Row Name 07/02/20 1540             Time Calculation- OT    OT Start Time  1435  -      OT Stop Time  1447  -      OT Time Calculation (min)  12 min  -      Total Timed Code Minutes- OT  12 minute(s)  -      OT Received On  07/02/20  -      OT - Next Appointment  07/03/20  -        User Key  (r) = Recorded By, (t) = Taken By, (c) = Cosigned By    Initials Name Provider Type    Randee Hutchins, OTR Occupational Therapist        Therapy Charges for Today      Code Description Service Date Service Provider Modifiers Qty    85173759451 HC OT THER PROC EA 15 MIN 7/2/2020 Randee Escalona, OTR GO 1               Randee Escalona, OTR  7/2/2020

## 2020-07-02 NOTE — PROGRESS NOTES
54 y.o.   LOS: 20 days   Patient Care Team:  Timo Griffith MD as PCP - General (Family Medicine)    Chief Complaint: Elevated blood sugars    Chief Complaint   Patient presents with   • Altered Mental Status       Subjective Patient's blood sugars are anywhere between 150s-200s.  He is on dysphagia puréed diet.  Tube feeds have been discontinued.      Interval History:    Review of Systems:   Constitutional: Positive for appetite change and fatigue.   Eyes: Negative for visual disturbance.   Respiratory: no shortness of breath.    Cardiovascular: no leg swelling.   Gastrointestinal: Negative for abdominal pain.   Endocrine: Negative for polydipsia and polyuria.   Musculoskeletal: Positive for arthralgias and myalgias.   Skin: Negative for pallor.   Neurological: Positive for weakness and numbness.   Psychiatric/Behavioral: Positive for sleep disturbance.         Review of Systems  Objective     Vital Signs   Temp:  [97.6 °F (36.4 °C)-98.3 °F (36.8 °C)] 97.6 °F (36.4 °C)  Heart Rate:  [62-69] 62  Resp:  [18] 18  BP: (122-125)/(61-67) 125/66    Physical Exam:  Constitutional - No distress, appears stated age  Eyes - PERRL, anicteric sclera  Ear nose and throat - External ears/nose normal, No adenopathy, midline trachea  Respiratory - Normal chest on inspection, palpation and diminished bilateral breath sounds on auscultation  Cardiovascular - Normal S1S2, without murmur  GI - Non tender, non distended, no hepatosplenomegaly, +BS  Musculoskeletal - No edema, cyanosis or clubbing  Neuro - alert      Physical ExamResults Review:     I reviewed the patient's new clinical results and summarized them in subjective and in plan.      Glucose   Date/Time Value Ref Range Status   07/02/2020 0531 119 (H) 65 - 99 mg/dL Final   07/01/2020 0812 253 (H) 65 - 99 mg/dL Final   06/30/2020 0533 183 (H) 65 - 99 mg/dL Final     Lab Results (last 24 hours)     Procedure Component Value Units Date/Time    POC Glucose Once  [[id number]]  (Abnormal) Collected:  07/02/20 1108    Specimen:  Blood Updated:  07/02/20 1111     Glucose 294 mg/dL     Basic Metabolic Panel [[id number]]  (Abnormal) Collected:  07/02/20 0531    Specimen:  Blood Updated:  07/02/20 0643     Glucose 119 mg/dL      BUN 22 mg/dL      Creatinine 0.72 mg/dL      Sodium 135 mmol/L      Potassium 4.4 mmol/L      Chloride 101 mmol/L      CO2 28.2 mmol/L      Calcium 9.3 mg/dL      eGFR Non African Amer 114 mL/min/1.73      BUN/Creatinine Ratio 30.6     Anion Gap 5.8 mmol/L     Narrative:       GFR Normal >60  Chronic Kidney Disease <60  Kidney Failure <15      CBC (No Diff) [[id number]]  (Abnormal) Collected:  07/02/20 0531    Specimen:  Blood Updated:  07/02/20 0612     WBC 5.00 10*3/mm3      RBC 3.95 10*6/mm3      Hemoglobin 11.3 g/dL      Hematocrit 34.2 %      MCV 86.6 fL      MCH 28.6 pg      MCHC 33.0 g/dL      RDW 15.2 %      RDW-SD 47.8 fl      MPV 11.3 fL      Platelets 330 10*3/mm3     POC Glucose Once [[id number]]  (Normal) Collected:  07/02/20 0126    Specimen:  Blood Updated:  07/02/20 0128     Glucose 96 mg/dL     POC Glucose Once [[id number]]  (Abnormal) Collected:  07/02/20 0034    Specimen:  Blood Updated:  07/02/20 0035     Glucose 63 mg/dL     POC Glucose Once [[id number]]  (Abnormal) Collected:  07/01/20 1546    Specimen:  Blood Updated:  07/01/20 1547     Glucose 350 mg/dL         Imaging Results (Last 24 Hours)     ** No results found for the last 24 hours. **          Medication Review: done      Current Facility-Administered Medications:   •  acetaminophen (TYLENOL) tablet 650 mg, 650 mg, Nasogastric, Q4H PRN **OR** acetaminophen (TYLENOL) suppository 650 mg, 650 mg, Rectal, Q4H PRN, Satnam Lewis MD  •  atorvastatin (LIPITOR) tablet 10 mg, 10 mg, Oral, Daily, Satnam Lewis MD, 10 mg at 07/02/20 0845  •  brimonidine (ALPHAGAN) 0.15 % ophthalmic solution 1 drop, 1 drop, Both Eyes, BID, Satnam Lewis MD, 1 drop at 07/02/20 0846  •   dextrose (D50W) 25 g/ 50mL Intravenous Solution 25 g, 25 g, Intravenous, Q15 Min PRN, Satnam Lewis MD, 25 g at 06/30/20 1500  •  dextrose (GLUTOSE) oral gel 15 g, 15 g, Oral, Q15 Min PRN, Satnam Lewis MD  •  diphenhydrAMINE (BENADRYL) injection 50 mg, 50 mg, Intravenous, Once PRN, Satnam Lewis MD  •  enoxaparin (LOVENOX) syringe 40 mg, 40 mg, Subcutaneous, Q24H, Satnam Lewis MD, 40 mg at 07/02/20 1148  •  famotidine (PEPCID) injection 20 mg, 20 mg, Intravenous, Once PRN, Satnam Lewis MD  •  glucagon (human recombinant) (GLUCAGEN DIAGNOSTIC) injection 1 mg, 1 mg, Subcutaneous, Q15 Min PRN, Satnam Lewis MD  •  Hold medication, 1 each, Does not apply, Continuous PRN, Satnam Lewis MD  •  Hold medication, 1 each, Does not apply, Continuous PRN, Satnam Lewis MD  •  hydrocortisone sodium succinate (Solu-CORTEF) injection 100 mg, 100 mg, Intravenous, Once PRN, Satnam Lewis MD  •  insulin glargine (LANTUS) injection 25 Units, 25 Units, Subcutaneous, Q12H, Rodrigo Van MD, 25 Units at 07/02/20 0845  •  insulin lispro (humaLOG) injection 3-5 Units, 3-5 Units, Subcutaneous, 4x Daily With Meals & Nightly, Rodrigo Van MD, 4 Units at 07/02/20 1148  •  insulin lispro (humaLOG) injection 5 Units, 5 Units, Subcutaneous, TID With Meals, Rodrigo Van MD, 5 Units at 07/02/20 1149  •  lacosamide (VIMPAT) tablet 100 mg, 100 mg, Oral, Q12H, Zac Lewis MD, 100 mg at 07/02/20 0845  •  latanoprost (XALATAN) 0.005 % ophthalmic solution 1 drop, 1 drop, Both Eyes, Nightly, Satnam Lewis MD, 1 drop at 06/30/20 2242  •  levothyroxine (SYNTHROID, LEVOTHROID) tablet 175 mcg, 175 mcg, Oral, Q AM, Rodrigo Van MD, 175 mcg at 07/02/20 0608  •  melatonin tablet 5 mg, 5 mg, Oral, Nightly, Blayne Souza MD, 5 mg at 07/01/20 2209  •  metoprolol tartrate (LOPRESSOR) injection 5 mg, 5 mg, Intravenous, Q6H PRN, Debbie,  Satnam Simmons MD  •  nystatin (MYCOSTATIN) powder, , Topical, Q12H, Satnam Lewis MD  •  ondansetron (ZOFRAN) injection 4 mg, 4 mg, Intravenous, Q6H PRN, Satnam Lewis MD  •  potassium chloride (MICRO-K) CR capsule 40 mEq, 40 mEq, Oral, PRN **OR** potassium chloride (KLOR-CON) packet 40 mEq, 40 mEq, Oral, PRN, 40 mEq at 06/17/20 0021 **OR** potassium chloride 10 mEq in 100 mL IVPB, 10 mEq, Intravenous, Q1H PRN, Satnam Lewis MD  •  potassium phosphate 45 mmol in sodium chloride 0.9 % 500 mL infusion, 45 mmol, Intravenous, PRN **OR** potassium phosphate 30 mmol in sodium chloride 0.9 % 250 mL infusion, 30 mmol, Intravenous, PRN, Last Rate: 31.3 mL/hr at 06/18/20 0635, 30 mmol at 06/18/20 0635 **OR** potassium phosphate 15 mmol in sodium chloride 0.9 % 100 mL infusion, 15 mmol, Intravenous, PRN **OR** sodium phosphates 40 mmol in sodium chloride 0.9 % 500 mL IVPB, 40 mmol, Intravenous, PRN **OR** sodium phosphates 20 mmol in sodium chloride 0.9 % 250 mL IVPB, 20 mmol, Intravenous, PRN, Satnam Lewis MD  •  risperiDONE (risperDAL) tablet 0.5 mg, 0.5 mg, Oral, Q12H, Lv Truong MD, 0.5 mg at 07/02/20 0845  •  sennosides-docusate (PERICOLACE) 8.6-50 MG per tablet 1 tablet, 1 tablet, Oral, BID PRN, Satnam Lewis MD  •  [COMPLETED] Insert peripheral IV, , , Once **AND** sodium chloride 0.9 % flush 10 mL, 10 mL, Intravenous, PRN, Satnam Lewis MD  •  sodium chloride 0.9 % flush 10 mL, 10 mL, Intravenous, Q12H, Satnam Lewis MD, 10 mL at 07/02/20 0845  •  sodium chloride 0.9 % flush 10 mL, 10 mL, Intravenous, PRN, Satnam Lewis MD  •  sodium chloride tablet 2 g, 2 g, Oral, BID With Meals, Blayne Souza MD, 2 g at 07/02/20 0844  •  timolol (TIMOPTIC) 0.25 % ophthalmic solution 1 drop, 1 drop, Both Eyes, Q12H, Satnam Lewis MD, 1 drop at 07/02/20 0846    Assessment/Plan     Active Hospital Problems    Diagnosis  POA   • Encephalopathy acute  "[G93.40]  Yes      Resolved Hospital Problems   No resolved problems to display.     Type 1 diabetes mellitus-uncontrolled  Low C-peptide levels  Continue Lantus 25 units twice daily  Continue Humalog 5 units with each meal along with holding parameters  Continue Humalog sliding scale.    Hypothyroidism  Continue levothyroxine 175 mcg oral daily.    Discussed plan with Nurse.     Bell Daly MD.  07/02/20  12:15 PM      EMR Dragon / transcription disclaimer:    \"Dictated utilizing Dragon dictation\".   "

## 2020-07-02 NOTE — PROGRESS NOTES
Adult Nutrition  Assessment/PES    Patient Name:  Anthony Yang  YOB: 1965  MRN: [id number]  Admit Date:  6/12/2020    Assessment Date:  7/2/2020    Comments:  Diet advanced to Dysphagia Pureed with some mashed, Consistent Carb. Pt ate all his eggs this am. TF's held to encourage po. Visited pt and asked foods he likes. Will add diet puddings to all meal and continue to monitor intake.    Reason for Assessment     Row Name 07/02/20 0966          Reason for Assessment    Reason For Assessment  follow-up protocol         Nutrition/Diet History     Row Name 07/02/20 0940          Nutrition/Diet History    Typical Food/Fluid Intake  diet advanced per speech eval, has NG tube but Tube feedings were held. Ate all his eggs this am     Food Preferences  eggs, mpot, puddings         Anthropometrics     Row Name 07/02/20 8419          Anthropometrics    Weight  97.5 kg (215 lb)         Labs/Tests/Procedures/Meds     Row Name 07/02/20 0940          Labs/Procedures/Meds    Lab Results Reviewed  reviewed     Lab Results Comments  na, bun, glu        Diagnostic Tests/Procedures    Diagnostic Test/Procedure Reviewed  reviewed     Diagnostic Test/Procedures Comments  speech eval        Medications    Pertinent Medications Reviewed  reviewed         Physical Findings     Row Name 07/02/20 0936          Physical Findings    Overall Physical Appearance  obese;other (see comments) legally blind     Gastrointestinal  feeding tube     Tubes  nasogastric tube     Oral/Mouth Cavity  poor dentition     Skin  other (see comments);edema b=15, bruising, rash           Nutrition Prescription Ordered     Row Name 07/02/20 0991          Nutrition Prescription PO    Current PO Diet  Dysphagia     Dysphagia Level  3  Pureed with some mashed     Common Modifiers  Consistent Carbohydrate     Other Modifiers  No Straws        Nutrition Prescription EN    Continuous TF Current Rate (mL/hr)  -- held to encourage po          Evaluation of Received Nutrient/Fluid Intake     Row Name 07/02/20 0943          PO Evaluation    % PO Intake  ate all his eggs this am, tolerating intake               Problem/Interventions:    Problem 2     Row Name 07/02/20 0944          Nutrition Diagnoses Problem 2    Problem 2  Swallowing Difficulty     Etiology (related to)  Medical Diagnosis     Signs/Symptoms (evidenced by)  SLP/Swallow eval     Swallow eval status  Done             Intervention Goal     Row Name 07/02/20 0944          Intervention Goal    General  Maintain nutrition;Reduce/improve symptoms;Meet nutritional needs for age/condition;Disease management/therapy;Improved nutrition related lab(s)     PO  Establish PO;PO intake (%)     PO Intake %  75 %     Transition  TF to PO     Weight  No significant weight loss         Nutrition Intervention     Row Name 07/02/20 0996          Nutrition Intervention    RD/Tech Action  Care plan reviewd;Follow Tx progress;Encourage intake         Nutrition Prescription     Row Name 07/02/20 0961          Nutrition Prescription PO    PO Prescription  Begin/change supplement     Supplement  Other (comment) diet pudding         Education/Evaluation     Row Name 07/02/20 0997          Monitor/Evaluation    Monitor  Per protocol;PO intake;Pertinent labs;Weight;Skin status           Electronically signed by:  Crystal Bland RD  07/02/20 09:52

## 2020-07-02 NOTE — PLAN OF CARE
Patient slept very well throughout the night. Patient is requesting to walk around the nurses station and is looking forward to a visit from his brother today. Patient has been A/O X4. Cortrak is still in place but tube feeds have been stopped. Patient is tolerating his mech soft diet.         Problem: Fall Risk (Adult)  Goal: Absence of Fall  Outcome: Ongoing (interventions implemented as appropriate)  Flowsheets (Taken 7/2/2020 0806)  Absence of Fall: making progress toward outcome     Problem: Patient Care Overview  Goal: Plan of Care Review  Outcome: Ongoing (interventions implemented as appropriate)  Goal: Individualization and Mutuality  Outcome: Ongoing (interventions implemented as appropriate)  Goal: Discharge Needs Assessment  Outcome: Ongoing (interventions implemented as appropriate)  Goal: Interprofessional Rounds/Family Conf  Outcome: Ongoing (interventions implemented as appropriate)     Problem: Skin Injury Risk (Adult)  Goal: Skin Health and Integrity  Outcome: Ongoing (interventions implemented as appropriate)     Problem: Meningitis/Encephalitis (Adult)  Goal: Signs and Symptoms of Listed Potential Problems Will be Absent, Minimized or Managed (Meningitis/Encephalitis)  Outcome: Ongoing (interventions implemented as appropriate)

## 2020-07-03 VITALS
WEIGHT: 201.7 LBS | HEIGHT: 66 IN | HEART RATE: 65 BPM | BODY MASS INDEX: 32.42 KG/M2 | DIASTOLIC BLOOD PRESSURE: 57 MMHG | RESPIRATION RATE: 18 BRPM | OXYGEN SATURATION: 95 % | SYSTOLIC BLOOD PRESSURE: 122 MMHG | TEMPERATURE: 97.1 F

## 2020-07-03 LAB
ANION GAP SERPL CALCULATED.3IONS-SCNC: 8.7 MMOL/L (ref 5–15)
BUN SERPL-MCNC: 22 MG/DL (ref 6–20)
BUN/CREAT SERPL: 31.9 (ref 7–25)
CALCIUM SPEC-SCNC: 9.2 MG/DL (ref 8.6–10.5)
CHLORIDE SERPL-SCNC: 104 MMOL/L (ref 98–107)
CO2 SERPL-SCNC: 26.3 MMOL/L (ref 22–29)
CREAT SERPL-MCNC: 0.69 MG/DL (ref 0.76–1.27)
DEPRECATED RDW RBC AUTO: 50 FL (ref 37–54)
ERYTHROCYTE [DISTWIDTH] IN BLOOD BY AUTOMATED COUNT: 15.8 % (ref 12.3–15.4)
GFR SERPL CREATININE-BSD FRML MDRD: 120 ML/MIN/1.73
GLUCOSE BLDC GLUCOMTR-MCNC: 22 MG/DL (ref 70–130)
GLUCOSE BLDC GLUCOMTR-MCNC: 285 MG/DL (ref 70–130)
GLUCOSE BLDC GLUCOMTR-MCNC: 293 MG/DL (ref 70–130)
GLUCOSE BLDC GLUCOMTR-MCNC: 87 MG/DL (ref 70–130)
GLUCOSE SERPL-MCNC: 73 MG/DL (ref 65–99)
HCT VFR BLD AUTO: 34 % (ref 37.5–51)
HGB BLD-MCNC: 11.5 G/DL (ref 13–17.7)
INSULIN AB SER-ACNC: 8.1 UU/ML
MCH RBC QN AUTO: 29.9 PG (ref 26.6–33)
MCHC RBC AUTO-ENTMCNC: 33.8 G/DL (ref 31.5–35.7)
MCV RBC AUTO: 88.3 FL (ref 79–97)
PLATELET # BLD AUTO: 332 10*3/MM3 (ref 140–450)
PMV BLD AUTO: 11.1 FL (ref 6–12)
POTASSIUM SERPL-SCNC: 4.1 MMOL/L (ref 3.5–5.2)
RBC # BLD AUTO: 3.85 10*6/MM3 (ref 4.14–5.8)
REF LAB TEST METHOD: NORMAL
REF LAB TEST METHOD: NORMAL
REFRIGERATED SORT: NORMAL
SARS-COV-2 RNA RESP QL NAA+PROBE: NOT DETECTED
SODIUM SERPL-SCNC: 139 MMOL/L (ref 136–145)
WBC # BLD AUTO: 4.61 10*3/MM3 (ref 3.4–10.8)

## 2020-07-03 PROCEDURE — 85027 COMPLETE CBC AUTOMATED: CPT | Performed by: INTERNAL MEDICINE

## 2020-07-03 PROCEDURE — 80048 BASIC METABOLIC PNL TOTAL CA: CPT | Performed by: INTERNAL MEDICINE

## 2020-07-03 PROCEDURE — 97110 THERAPEUTIC EXERCISES: CPT

## 2020-07-03 PROCEDURE — 99233 SBSQ HOSP IP/OBS HIGH 50: CPT | Performed by: INTERNAL MEDICINE

## 2020-07-03 PROCEDURE — 63710000001 INSULIN LISPRO (HUMAN) PER 5 UNITS: Performed by: INTERNAL MEDICINE

## 2020-07-03 PROCEDURE — 63710000001 INSULIN GLARGINE PER 5 UNITS: Performed by: INTERNAL MEDICINE

## 2020-07-03 PROCEDURE — 82962 GLUCOSE BLOOD TEST: CPT

## 2020-07-03 PROCEDURE — 92526 ORAL FUNCTION THERAPY: CPT

## 2020-07-03 PROCEDURE — 25010000002 ENOXAPARIN PER 10 MG: Performed by: INTERNAL MEDICINE

## 2020-07-03 PROCEDURE — 97535 SELF CARE MNGMENT TRAINING: CPT

## 2020-07-03 RX ORDER — RISPERIDONE 0.5 MG/1
0.5 TABLET ORAL EVERY 12 HOURS SCHEDULED
Qty: 60 TABLET | Refills: 0 | Status: SHIPPED | OUTPATIENT
Start: 2020-07-03 | End: 2020-08-02

## 2020-07-03 RX ORDER — INSULIN GLARGINE 100 [IU]/ML
10 INJECTION, SOLUTION SUBCUTANEOUS NIGHTLY
Status: DISCONTINUED | OUTPATIENT
Start: 2020-07-03 | End: 2020-07-03 | Stop reason: HOSPADM

## 2020-07-03 RX ORDER — LEVOTHYROXINE SODIUM 175 UG/1
175 TABLET ORAL DAILY
Qty: 30 TABLET | Refills: 0 | Status: SHIPPED | OUTPATIENT
Start: 2020-07-03 | End: 2020-09-14 | Stop reason: SDUPTHER

## 2020-07-03 RX ORDER — SODIUM CHLORIDE 1000 MG
2 TABLET, SOLUBLE MISCELLANEOUS 2 TIMES DAILY WITH MEALS
Qty: 60 TABLET | Refills: 0 | Status: SHIPPED | OUTPATIENT
Start: 2020-07-03

## 2020-07-03 RX ORDER — AMOXICILLIN 250 MG
1 CAPSULE ORAL 2 TIMES DAILY PRN
Qty: 60 TABLET | Refills: 0 | Status: SHIPPED | OUTPATIENT
Start: 2020-07-03 | End: 2020-08-02

## 2020-07-03 RX ORDER — LACOSAMIDE 100 MG/1
100 TABLET ORAL EVERY 12 HOURS SCHEDULED
Qty: 60 TABLET | Refills: 0 | Status: SHIPPED | OUTPATIENT
Start: 2020-07-03

## 2020-07-03 RX ORDER — INSULIN GLARGINE 100 [IU]/ML
15 INJECTION, SOLUTION SUBCUTANEOUS EVERY MORNING
Qty: 4.5 ML | Refills: 0 | Status: SHIPPED | OUTPATIENT
Start: 2020-07-03 | End: 2020-08-02

## 2020-07-03 RX ORDER — INSULIN GLARGINE 100 [IU]/ML
15 INJECTION, SOLUTION SUBCUTANEOUS EVERY MORNING
Status: DISCONTINUED | OUTPATIENT
Start: 2020-07-04 | End: 2020-07-03 | Stop reason: HOSPADM

## 2020-07-03 RX ORDER — CHOLECALCIFEROL (VITAMIN D3) 125 MCG
5 CAPSULE ORAL NIGHTLY
Qty: 30 TABLET | Refills: 0 | Status: SHIPPED | OUTPATIENT
Start: 2020-07-03 | End: 2020-08-02

## 2020-07-03 RX ADMIN — INSULIN LISPRO 4 UNITS: 100 INJECTION, SOLUTION INTRAVENOUS; SUBCUTANEOUS at 11:55

## 2020-07-03 RX ADMIN — SODIUM CHLORIDE TAB 1 GM 2 G: 1 TAB at 08:28

## 2020-07-03 RX ADMIN — LEVOTHYROXINE SODIUM 175 MCG: 175 TABLET ORAL at 05:56

## 2020-07-03 RX ADMIN — INSULIN GLARGINE 20 UNITS: 100 INJECTION, SOLUTION SUBCUTANEOUS at 08:28

## 2020-07-03 RX ADMIN — BRIMONIDINE TARTRATE 1 DROP: 1.5 SOLUTION OPHTHALMIC at 08:31

## 2020-07-03 RX ADMIN — TIMOLOL MALEATE 1 DROP: 2.5 SOLUTION/ DROPS OPHTHALMIC at 08:31

## 2020-07-03 RX ADMIN — INSULIN LISPRO 2 UNITS: 100 INJECTION, SOLUTION INTRAVENOUS; SUBCUTANEOUS at 16:51

## 2020-07-03 RX ADMIN — LACOSAMIDE 100 MG: 100 TABLET, FILM COATED ORAL at 08:27

## 2020-07-03 RX ADMIN — NYSTATIN: 100000 POWDER TOPICAL at 08:31

## 2020-07-03 RX ADMIN — RISPERIDONE 0.5 MG: 0.5 TABLET, FILM COATED ORAL at 08:27

## 2020-07-03 RX ADMIN — SODIUM CHLORIDE, PRESERVATIVE FREE 10 ML: 5 INJECTION INTRAVENOUS at 08:27

## 2020-07-03 RX ADMIN — ENOXAPARIN SODIUM 40 MG: 40 INJECTION SUBCUTANEOUS at 11:55

## 2020-07-03 RX ADMIN — INSULIN LISPRO 2 UNITS: 100 INJECTION, SOLUTION INTRAVENOUS; SUBCUTANEOUS at 17:09

## 2020-07-03 RX ADMIN — ATORVASTATIN CALCIUM 10 MG: 20 TABLET, FILM COATED ORAL at 08:27

## 2020-07-03 NOTE — DISCHARGE SUMMARY
DISCHARGE SUMMARY    Patient Name: Anthony Yang  Age/Sex: 54 y.o. 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 male.  Brought in by EMS after apparently being found down by his brother last heard from him 5 days ago attempts to contact him 2 days ago unsuccessful.  Apparently he is a type I diabetic with a history of seizures neuropathy and glaucoma.  Patient has been unresponsive to commands he is uncooperative but he is been flapping about the try to do an LP but he was just in too much constant motion even with sedatives.  He was sent up to the ICU.  I really have no other history on him.  His brother confirmed this if not known any episode like this before he is not been ever known to use drugs or alcohol he is an indoor person he does not get outside that he will have exposure to bugs or ticks etc.    Hospital Course:   Anthony Yang presented to [facility] with complaints of being found unresponsive for unspecified amount of time, likely more than couple of days.  He was in a coma, got intubated was on the mechanical ventilator with aspiration pneumonia.  Has extensive work-up by neurology, was ruled out for meningitis, and was suspected to be either hypoglycemia induced  severe encephalopathy versus hypoxemia induced encephalopathy versus status epilepticus induced encephalopathy, given the lack of progress and based on the work-up he was eventually treated for the seizure, his blood sugar was controlled and he was treated with IVIG for presumed autoimmune encephalitis.  Despite his lack of improvement initially but after several days the patient started to wake up and was eventually weaned and liberated from the ventilator, was working with physical therapy and he was evaluated by endocrinology, he is not actually type II he is rather type 1 diabetes mellitus and patient has very brittle diabetes and the goal at this point is to have a partial control keeping the blood sugar between 200-300 since he has high tendency to run very low number on his blood sugar.  His regimen was discussed with the endocrinologist and modified on the day of discharge to 15 units of Lantus to be taken every morning and then 10 units to be taken at night with instruction to hold the Lantus if the blood sugar is less than 180.  He is on 4 units scheduled regular insulin before each meal and on a low-dose sliding scale of insulin in addition to that.  Please see medication reconciliation form for more details.  Patient was started on the Vimpat for seizure control which will be continued per neurology recommendation  Patient had dysphagia which was getting better, nasogastric tube was removed and he is tolerating p.o. diet per speech therapy recommendation and will benefit from follow-up with speech therapy after discharge.  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  He has hypothyroidism and his dose was adjusted by endocrinology as well, he did have hyponatremia and patient did improve with the addition of the sodium pill.  He is on Risperdal scheduled twice a day that did help with the  agitation and with the encephalopathy, may reassess to see if it needs to be used long-term especially if his mental status continues to improve.  He is not requiring any restraints with no agitation and no need for any PRN IV medication for that purpose as well.    Consults:   IP CONSULT TO NEUROLOGY  IP CONSULT TO PULMONOLOGY  IP CONSULT TO NEUROLOGY  IP CONSULT TO INFECTIOUS DISEASES  IP CONSULT TO NUTRITION SERVICES  IP CONSULT TO CASE MANAGEMENT   IP CONSULT TO DIABETES EDUCATOR  IP CONSULT TO ENDOCRINOLOGY  IP CONSULT TO REHAB ADMISSION    Significant Discharge Diagnostics   Procedures Performed:         Pertinent Lab Results:  Results from last 7 days   Lab Units 07/03/20  0550 07/02/20  0531 07/01/20  0812 06/30/20  0533 06/29/20  0403 06/28/20  1054   SODIUM mmol/L 139 135* 134* 133* 127* 126*   POTASSIUM mmol/L 4.1 4.4 4.5 4.4 4.1 4.5   CHLORIDE mmol/L 104 101 98 99 95* 95*   CO2 mmol/L 26.3 28.2 24.4 25.4 25.6 22.2   BUN mg/dL 22* 22* 20 18 15 14   CREATININE mg/dL 0.69* 0.72* 0.74* 0.74* 0.62* 0.66*   GLUCOSE mg/dL 73 119* 253* 183* 172* 310*   CALCIUM mg/dL 9.2 9.3 9.0 9.2 8.9 8.5*         Results from last 7 days   Lab Units 07/03/20  0550 07/02/20  0531 07/01/20  0812 06/30/20  0533 06/29/20  0403 06/28/20  1054   WBC 10*3/mm3 4.61 5.00 4.09 3.68 3.81 4.13   HEMOGLOBIN g/dL 11.5* 11.3* 11.4* 11.6* 11.9* 12.5*   HEMATOCRIT % 34.0* 34.2* 34.1* 34.2* 35.7* 38.5   PLATELETS 10*3/mm3 332 330 345 319 340 300   MCV fL 88.3 86.6 87.4 86.6 87.3 89.3   MCH pg 29.9 28.6 29.2 29.4 29.1 29.0   MCHC g/dL 33.8 33.0 33.4 33.9 33.3 32.5   RDW % 15.8* 15.2 15.0 14.9 15.1 14.8   RDW-SD fl 50.0 47.8 48.0 46.0 48.5 48.2   MPV fL 11.1 11.3 11.2 11.2 11.1 10.2                                                           Imaging Results:  Imaging Results (All)     Procedure Component Value Units Date/Time    XR Abdomen KUJANELLE [[id number]] Collected:  06/30/20 1438     Updated:  06/30/20 1442    Narrative:       XR  ABDOMEN KUB-     INDICATIONS: NG tube placement     TECHNIQUE: Supine views of the abdomen     COMPARISON: 06/16/2020     FINDINGS:     NG tube appears to extend to the duodenal jejunal junction region      The bowel gas pattern is nonspecific. No supine evidence for free  intraperitoneal gas.     Follow-up as clinically indicated.             Impression:          As described.     This report was finalized on 6/30/2020 2:39 PM by Dr. Johnny Springer M.D.       IR Lumbar Puncture Diag/Thera [[id number]] Collected:  06/23/20 1522     Updated:  06/23/20 1529    Narrative:       FLUOROSCOPIC LUMBAR PUNCTURE     Clinical: Autoimmune encephalitis     FLUOROSCOPY TIME: 2 minutes 18 seconds, 3 images.     Informed consent obtained.     Note: Patient was agitated and somewhat combative, opening and closing  pressures could not be taken.     PROCEDURE: The patient was placed prone on the fluoroscopy table. The  lumbar region was prepped and shape in the usual fashion. 1% lidocaine  was administered at the L5 level. Lumbar puncture at this location was  unsuccessful. Lidocaine was then administered at the L4-5 interspace.  Lumbar puncture could be performed at this location using an 18-gauge  spinal needle. The tap was bloody which partially cleared, this could be  traumatic for an earlier attempt was made at L5. Approximately 15 mL of  CSF was aspirated. No complications encountered.     CONCLUSION:  1. Due to the patient's condition, opening and closing pressures could  not be obtained.  2. Bloody spinal tap which partially cleared.     This report was finalized on 6/23/2020 3:26 PM by Dr. Bud Blevins M.D.       XR Chest 1 View [[id number]] Collected:  06/23/20 0904     Updated:  06/23/20 1252    Narrative:       XR CHEST 1 VW-     HISTORY: 54-year-old male with seizures. Encephalopathy. Diabetes.     FINDINGS: Apical lordotic projection. There is central pulmonary  vascular congestion, but there is no definite  evidence for interstitial  edema. The projection limits evaluation of the lower lobes and no  definite consolidations are seen, but small pleural effusions cannot be  excluded. There is an NG tube within the stomach. Fully upright PA and  lateral views of the chest are recommended when the patient is able.     This report was finalized on 6/23/2020 12:49 PM by Dr. Shania Lugo M.D.       MRI Brain Without Contrast [[id number]] Collected:  06/22/20 2058     Updated:  06/23/20 1039    Narrative:       MRI OF THE BRAIN WITHOUT CONTRAST     CLINICAL HISTORY: Altered mental status and seizures.     MRI of the brain is obtained with sagittal T1, axial T1, axial FLAIR,  axial T2, axial diffusion, and axial gradient echo images.     Comparison is made to previous MRI of the brain dated 06/13/2020.     FINDINGS:     The ventricles, sulci, and cisterns are age appropriate. There are no  abnormal areas of restricted diffusion. The midline intracranial anatomy  is within normal limits. Minor changes of chronic small vessel ischemic  phenomena are noted. The major intracranial flow related signal voids  are within normal limits. There are no abnormal areas of susceptibility  artifact.     Extensive fluid signal intensity is noted within the mastoid air cells  bilaterally. A similar extent of fluid signal intensity was identified  on the prior study. There is mucosal thickening appreciated within the  maxillary sinuses, the ethmoid air cells, the frontal sinus, and the  sphenoid sinus. The changes of inflammatory paranasal sinus disease are  more prominent on the current exam.       Impression:          There is no evidence for acute intracranial pathology. No potential  seizure focus is identified. However, if the intent of the examination  is to evaluate for a seizure focus, a contrast enhanced MRI of the brain  would be a more optimal study.     Extensive fluid signal intensity is seen within the mastoid air cells  bilaterally.  Mild/moderate changes of inflammatory paranasal sinus  disease are noted as discussed above.     This report was finalized on 6/23/2020 10:36 AM by Dr. Michael Biggs M.D.       XR Abdomen KUB [[id number]] Collected:  06/16/20 2048     Updated:  06/16/20 2052    Narrative:       XR ABDOMEN KUB-  06/16/2020     HISTORY: Feeding tube placement.     The feeding tube is seen with its tip overlying the left upper quadrant  probably in the body of the stomach. Bowel gas pattern is unremarkable.       Impression:       Feeding tube tip in the body of the stomach.     This report was finalized on 6/16/2020 8:49 PM by Dr. Thiago Lorenzo M.D.       MRI Brain With & Without Contrast [[id number]] Collected:  06/13/20 1929     Updated:  06/15/20 1033    Narrative:       BRAIN MRI WITHOUT AND WITH IV CONTRAST     HISTORY: Found down yesterday. Seizures. Diabetes.     TECHNIQUE: MRI of the brain was performed before and after IV gadolinium  administration. This is correlated with a head CT performed yesterday.     FINDINGS: There is normal caliber of the ventricular system and no  restricted diffusion. The brain parenchyma appears normal. Expected flow  voids are observed. The extra-axial spaces appear normal. There is no  abnormal contrast enhancement. There is some fluid in the mastoid air  cells. Paranasal sinuses are clear. Orbital structures appear normal.  The optic chiasm and pituitary appear grossly normal.       Impression:       Fluid in the mastoid air cells bilaterally. Otherwise  negative brain MRI.     This report was finalized on 6/13/2020 7:44 PM by Dr. Troy Orellana M.D.       XR Chest 1 View [[id number]] Collected:  06/14/20 0523     Updated:  06/14/20 0528    Narrative:       PORTABLE CHEST RADIOGRAPH     HISTORY: Respiratory failure     COMPARISON: 06/12/2020     FINDINGS:  Endotracheal tube terminates in satisfactory position. Weighted enteric  feeding tube extends into the proximal body of the stomach.  Increasing  bibasilar consolidation is noted when compared to prior exam. There are  bilateral pleural effusions. No pneumothorax is seen.       Impression:       Worsening consolidation identified at the lung bases, as well as  suspected bilateral effusions.     This report was finalized on 6/14/2020 5:25 AM by Dr. Erica Luis M.D.       XR Abdomen KUB [[id number]] Collected:  06/13/20 2253     Updated:  06/13/20 2258    Narrative:       KUB     HISTORY: Feeding tube placement     COMPARISON: None available.        FINDINGS:  Weighted enteric feeding tube extends into the proximal body of the  stomach. Endotracheal tube terminates in satisfactory position. There is  some increased density noted at the left lung base. Correlation with any  evidence for pneumonia is recommended. Visualized bowel gas pattern is  nonobstructive.       Impression:       Weighted enteric feeding tube extends into the proximal body of the  stomach.     This report was finalized on 6/13/2020 10:55 PM by Dr. Erica Luis M.D.       IR Lumbar Puncture Diag/Thera [[id number]] Collected:  06/12/20 1853     Updated:  06/12/20 1859    Narrative:       LUMBAR PUNCTURE FOR DIAGNOSIS UNDER FLUOROSCOPIC GUIDANCE     HISTORY: Confusion. Possible meningitis or encephalitis.     FINDINGS: Following sterile prep and local anesthetic, a 20-gauge needle  was advanced into the lumbar subarachnoid space at L3-4 by Dr. Danielle. The opening pressure is elevated, measuring 320 mm of water.  12 cc of clear colorless CSF was obtained and sent for studies as  requested. The closing pressure measures 250 mm of water.     The patient tolerated the procedure well and there were no immediate  complications. One image was obtained and the fluoroscopy time measures  3 seconds.     This report was finalized on 6/12/2020 6:56 PM by Dr. Esteban Danielle M.D.       XR Chest 1 View [[id number]] Collected:  06/12/20 1548     Updated:  06/12/20 1553    Narrative:        ONE VIEW PORTABLE CHEST AT 3:29 PM     HISTORY: Evaluate ET tube position.     FINDINGS: There has been recent insertion of an ET tube which ends 1.2  cm above the chris. The lungs are moderately expanded and grossly clear  and unchanged from 5 hours ago. The heart size is normal.     This report was finalized on 6/12/2020 3:50 PM by Dr. Esteban Danielle M.D.       CT Head Without Contrast [[id number]] Collected:  06/12/20 1259     Updated:  06/12/20 1309    Narrative:       EMERGENCY NONCONTRAST HEAD CT 06/12/2020     CLINICAL HISTORY: Altered mental status. Patient was found down in  apartment.      TECHNIQUE: Spiral CT images were obtained from the base of the skull to  the vertex without intravenous contrast. Images were reformatted and  submitted in 3 mm thick axial CT section with brain algorithm and 2 mm  thick axial CT section with high-resolution bone algorithm and 2 mm  thick sagittal and coronal reconstructions were performed and submitted  in brain algorithm.       There are no prior studies from [facility] for  comparison.     FINDINGS: There is some streak artifact through the left side of the  mitch limiting evaluation. Overall the brain parenchyma is normal in  attenuation. The ventricles are normal in size. I see no focal mass  effect and no midline shift and no extra-axial fluid collections are  identified and there is no evidence of acute intracranial hemorrhage.  The paranasal sinuses are clear.  There is a small amount of fluid in  the mastoid air cells bilaterally.       Impression:       1. Small amount of fluid in the mastoid air cells bilaterally,  otherwise, this is a negative head CT and the etiology of the mental  status change is not established on this exam and if clinical symptoms  warrant an MRI of the brain could be obtained for more complete  assessment.       Radiation dose reduction techniques were utilized, including automated  exposure control and exposure  modulation based on body size.     This report was finalized on 6/12/2020 1:06 PM by Dr. Triston Ayala M.D.       XR Chest 1 View [[id number]] Collected:  06/12/20 1207     Updated:  06/12/20 1225    Narrative:       ONE VIEW PORTABLE CHEST     HISTORY: Found down. Confusion. Diabetes.     FINDINGS: The lungs are well-expanded and clear and the heart is top  normal in size. There is no acute disease.     This report was finalized on 6/12/2020 12:22 PM by Dr. Esteban Danielle M.D.             Objective:   Temp:  [98 °F (36.7 °C)-98.3 °F (36.8 °C)] 98.3 °F (36.8 °C)  Heart Rate:  [61-66] 61  Resp:  [18] 18  BP: (123-150)/(65-67) 123/65      on    Device (Oxygen Therapy): room air    Intake/Output Summary (Last 24 hours) at 7/3/2020 1052  Last data filed at 7/2/2020 1600  Gross per 24 hour   Intake 250 ml   Output --   Net 250 ml     Body mass index is 32.57 kg/m².      07/01/20  0606 07/02/20  0554 07/03/20  0626   Weight: 97.5 kg (215 lb) 97.5 kg (215 lb) 91.5 kg (201 lb 11.2 oz)     Weight change: -6.033 kg (-13 lb 4.8 oz)    Physical Exam:      General: Alert, cooperative, in no acute distress.  On room air         HEENT:  No oral lesions. No thrush. Oral mucosa moist.,  Speech quality is better   Chest Wall:  No abnormalities observed.             Neck:  Trachea midline. No JVD.   Pulmonary:  CTAB. No wheezes. Respirations regular, even and unlabored.          Cardio:  Regular rhythm and normal rate. Normal S1 and S2. No murmur, gallop, rub or click.    Abdominal:  Soft, non-tender and non-distended. Normal bowel sounds. No masses. No organomegaly. No guarding. No rebound tenderness.  Extremities:  Moves all extremities well. No cyanosis. No redness.  Trace edema.     Discharge Medications and Instructions:     Discharge Medications     Discharge Medications      New Medications      Instructions Start Date   insulin glargine 100 UNIT/ML injection  Commonly known as:  LANTUS   15 Units, Subcutaneous, Every Morning     "  Insulin Glargine 100 UNIT/ML injection pen  Commonly known as:  LANTUS SOLOSTAR   10 Units, Subcutaneous, Nightly, Hold if blood sugar is less than 180      lacosamide 100 MG tablet tablet  Commonly known as:  VIMPAT   100 mg, Oral, Every 12 Hours Scheduled      melatonin 5 MG tablet tablet   5 mg, Oral, Nightly      risperiDONE 0.5 MG tablet  Commonly known as:  risperDAL   0.5 mg, Oral, Every 12 Hours Scheduled      sennosides-docusate 8.6-50 MG per tablet  Commonly known as:  PERICOLACE   1 tablet, Oral, 2 Times Daily PRN      sodium chloride 1 g tablet   2 g, Oral, 2 Times Daily With Meals         Changes to Medications      Instructions Start Date   insulin lispro 100 UNIT/ML injection  Commonly known as:  humaLOG  What changed:    · how much to take  · how to take this  · when to take this  · additional instructions   4 Units, Subcutaneous, 3 Times Daily With Meals      levothyroxine 175 MCG tablet  Commonly known as:  SYNTHROID, LEVOTHROID  What changed:    · medication strength  · how much to take  · how to take this  · when to take this  · additional instructions   175 mcg, Oral, Daily         Continue These Medications      Instructions Start Date   brimonidine 0.15 % ophthalmic solution  Commonly known as:  ALPHAGAN   1 drop, Both Eyes, 2 Times Daily      [employer] Vitamin B-12 2000 MCG tablet  Generic drug:  cyanocobalamin   2,000 mcg, Oral, Daily      freestyle lancets   Use to test blood sugar daily DX:E11.9      Insulin Syringe 31G X 5/16\" 1 ML misc   Use daily for insulin.      latanoprost 0.005 % ophthalmic solution  Commonly known as:  XALATAN   1 drop, Both Eyes, Nightly      simvastatin 20 MG tablet  Commonly known as:  ZOCOR   20 mg, Oral, Nightly      timolol 0.25 % ophthalmic solution  Commonly known as:  TIMOPTIC   1 drop, Both Eyes, 2 Times Daily         Stop These Medications    enalapril 2.5 MG tablet  Commonly known as:  VASOTEC     gabapentin 600 MG tablet  Commonly known as:  NEURONTIN   "   glucose blood test strip  Commonly known as:  FREESTYLE LITE     insulin NPH-insulin regular (70-30) 100 UNIT/ML injection  Commonly known as:  humuLIN 70/30,novoLIN 70/30     metFORMIN 500 MG tablet  Commonly known as:  GLUCOPHAGE            Discharge Diet:    Dietary Orders (From admission, onward)     Start     Ordered    07/02/20 1200  Dietary Nutrition Supplements Other (See Comment); diet pudding  Daily With Breakfast, Lunch & Dinner     Question Answer Comment   Select Supplement: Other (See Comment)    Other diet pudding        07/02/20 0955    07/02/20 1020  Diet Dysphagia; III - Pureed With Some Mashed; Nectar / Syrup Thick; No Straws; Consistent Carbohydrate  Diet Effective Now     Question Answer Comment   Diet Texture / Consistency Dysphagia    Select Dysphagia level: III - Pureed With Some Mashed    Fluid Consistency: Nectar / Syrup Thick    Other Dysphagia Options: No Straws    Common Modifiers Consistent Carbohydrate        07/02/20 1019    06/14/20 1156  Place Feeding Tube Per [employer] System  (Tube Feeding Placement LAWSON)  Until Discontinued     Question:  Tube Feeding Route  Answer:  NG - Nasogastric    06/14/20 1156                Activity at Discharge:   As tolerated    Discharge disposition: Skilled nursing facility    Discharge Instructions and Follow ups:    Follow-up Information     Timo Griffith MD .    Specialty:  Family Medicine  Contact information:  [address] [place] [address]  [phone]                 No future appointments.     Medication Reconciliation: Please see electronically completed Med Rec.    Total time spent discharging patient including evaluation, medication reconciliation, arranging follow up, and post hospitalization instructions and education total time exceeds 30 minutes.     Rick Lutz MD  07/03/20  10:52      Dictated utilizing Dragon dictation

## 2020-07-03 NOTE — PLAN OF CARE
Patients vitals stable. Patient denies pain and discomfort this shift. Patient asleep most of shift with no complaints. Patient awaiting COVID 19 results for discharge to rehab facility. Will continue to monitor.

## 2020-07-03 NOTE — PROGRESS NOTES
54 y.o.   LOS: 21 days   Patient Care Team:  Timo Griffith MD as PCP - General (Family Medicine)    Chief Complaint: Extremely variable blood sugars    Chief Complaint   Patient presents with   • Altered Mental Status       Subjective Patient's blood sugars are very brittle.  Runs anywhere between 20s-200s.  Was called by the nurse pertaining to his insulin changes and his significantly low blood sugars.      Interval History:    Review of Systems: Constitutional: Positive for appetite change and fatigue.   Eyes: Negative for visual disturbance.   Respiratory: no shortness of breath.    Cardiovascular: no leg swelling.   Gastrointestinal: Negative for abdominal pain.   Endocrine: Negative for polydipsia and polyuria.   Musculoskeletal: Positive for arthralgias and myalgias.   Skin: Negative for pallor.   Neurological: Positive for weakness and numbness.   Psychiatric/Behavioral: Positive for sleep disturbance.  Review of Systems  Objective     Vital Signs   Temp:  [97.1 °F (36.2 °C)-98.3 °F (36.8 °C)] 97.1 °F (36.2 °C)  Heart Rate:  [61-66] 65  Resp:  [18] 18  BP: (122-150)/(57-67) 122/57    Physical Exam: Constitutional - No distress, appears stated age  Eyes - PERRL, anicteric sclera  Ear nose and throat - External ears/nose normal, No adenopathy, midline trachea  Respiratory - Normal chest on inspection, palpation and diminished bilateral breath sounds on auscultation  Cardiovascular - Normal S1S2, without murmur  GI - Non tender, non distended, no hepatosplenomegaly, +BS  Musculoskeletal - No edema, cyanosis or clubbing  Neuro - alert          Physical ExamResults Review:     I reviewed the patient's new clinical results and summarized them in subjective and in plan.      Glucose   Date/Time Value Ref Range Status   07/03/2020 0550 73 65 - 99 mg/dL Final   07/02/2020 0531 119 (H) 65 - 99 mg/dL Final   07/01/2020 0812 253 (H) 65 - 99 mg/dL Final     Lab Results (last 24 hours)     Procedure Component  Value Units Date/Time    POC Glucose Once [[id number]]  (Abnormal) Collected:  07/03/20 1057    Specimen:  Blood Updated:  07/03/20 1057     Glucose 285 mg/dL     ENCEPHALOPATHY, AUTOIMMUNE EVALUATION, SERUM [[id number]] Collected:  06/24/20 0932    Specimen:  Blood Updated:  07/03/20 0847     Reference Lab Report See attached report     RefSor --    Narrative:       See original report from [facility].      Basic Metabolic Panel [[id number]]  (Abnormal) Collected:  07/03/20 0550    Specimen:  Blood Updated:  07/03/20 0643     Glucose 73 mg/dL      BUN 22 mg/dL      Creatinine 0.69 mg/dL      Sodium 139 mmol/L      Potassium 4.1 mmol/L      Chloride 104 mmol/L      CO2 26.3 mmol/L      Calcium 9.2 mg/dL      eGFR Non African Amer 120 mL/min/1.73      BUN/Creatinine Ratio 31.9     Anion Gap 8.7 mmol/L     Narrative:       GFR Normal >60  Chronic Kidney Disease <60  Kidney Failure <15      POC Glucose Once [[id number]]  (Normal) Collected:  07/03/20 0610    Specimen:  Blood Updated:  07/03/20 0615     Glucose 87 mg/dL     POC Glucose Once [[id number]]  (Abnormal) Collected:  07/03/20 0609    Specimen:  Blood Updated:  07/03/20 0615     Glucose 22 mg/dL     CBC (No Diff) [[id number]]  (Abnormal) Collected:  07/03/20 0550    Specimen:  Blood Updated:  07/03/20 0615     WBC 4.61 10*3/mm3      RBC 3.85 10*6/mm3      Hemoglobin 11.5 g/dL      Hematocrit 34.0 %      MCV 88.3 fL      MCH 29.9 pg      MCHC 33.8 g/dL      RDW 15.8 %      RDW-SD 50.0 fl      MPV 11.1 fL      Platelets 332 10*3/mm3     POC Glucose Once [[id number]]  (Abnormal) Collected:  07/02/20 2111    Specimen:  Blood Updated:  07/02/20 2118     Glucose 214 mg/dL     COVID PRE-OP / PRE-PROCEDURE SCREENING ORDER (NO ISOLATION) - Swab, Nasopharynx [[id number]] Collected:  07/02/20 1642    Specimen:  Swab from Nasopharynx Updated:  07/02/20 1651    Narrative:       The following orders were created for panel order COVID PRE-OP / PRE-PROCEDURE SCREENING  ORDER (NO ISOLATION) - Swab, Nasopharynx.  Procedure                               Abnormality         Status                     ---------                               -----------         ------                     COVID-19,BIOTAP, NP/OP S...[[id number]]                      In process                   Please view results for these tests on the individual orders.    COVID-19,BIOTAP, NP/OP SWAB IN TRANSPORT MEDIA OR SALINE 24-36 HR TAT - Swab, Nasopharynx [[id number]] Collected:  07/02/20 1642    Specimen:  Swab from Nasopharynx Updated:  07/02/20 1651    POC Glucose Once [[id number]]  (Normal) Collected:  07/02/20 1640    Specimen:  Blood Updated:  07/02/20 1642     Glucose 71 mg/dL     POC Glucose Once [[id number]]  (Abnormal) Collected:  07/02/20 1601    Specimen:  Blood Updated:  07/02/20 1602     Glucose 53 mg/dL     POC Glucose Once [[id number]]  (Abnormal) Collected:  07/02/20 1539    Specimen:  Blood Updated:  07/02/20 1540     Glucose 55 mg/dL         Imaging Results (Last 24 Hours)     ** No results found for the last 24 hours. **          Medication Review: done      Current Facility-Administered Medications:   •  acetaminophen (TYLENOL) tablet 650 mg, 650 mg, Nasogastric, Q4H PRN **OR** acetaminophen (TYLENOL) suppository 650 mg, 650 mg, Rectal, Q4H PRN, Satnam Lewis MD  •  atorvastatin (LIPITOR) tablet 10 mg, 10 mg, Oral, Daily, Satnam Lewis MD, 10 mg at 07/03/20 0827  •  brimonidine (ALPHAGAN) 0.15 % ophthalmic solution 1 drop, 1 drop, Both Eyes, BID, Satnam Lewis MD, 1 drop at 07/03/20 0831  •  dextrose (D50W) 25 g/ 50mL Intravenous Solution 25 g, 25 g, Intravenous, Q15 Min PRN, Satnam Lewis MD, 25 g at 06/30/20 1500  •  dextrose (GLUTOSE) oral gel 15 g, 15 g, Oral, Q15 Min PRN, Satnam Lewis MD  •  diphenhydrAMINE (BENADRYL) injection 50 mg, 50 mg, Intravenous, Once PRN, Satnam Lewis MD  •  enoxaparin (LOVENOX) syringe 40 mg, 40 mg, Subcutaneous,  Q24H, Satnam Lewis MD, 40 mg at 07/03/20 1155  •  famotidine (PEPCID) injection 20 mg, 20 mg, Intravenous, Once PRN, Satnam Lewis MD  •  glucagon (human recombinant) (GLUCAGEN DIAGNOSTIC) injection 1 mg, 1 mg, Subcutaneous, Q15 Min PRN, Satnam Lewis MD  •  Hold medication, 1 each, Does not apply, Continuous PRN, Satnam Lewis MD  •  Hold medication, 1 each, Does not apply, Continuous PRN, Satnam Lewis MD  •  hydrocortisone sodium succinate (Solu-CORTEF) injection 100 mg, 100 mg, Intravenous, Once PRN, Satnam Lewis MD  •  insulin glargine (LANTUS) injection 10 Units, 10 Units, Subcutaneous, Nightly, Bell Daly MD  •  [START ON 7/4/2020] insulin glargine (LANTUS) injection 15 Units, 15 Units, Subcutaneous, QAM, Bell Daly MD  •  insulin lispro (humaLOG) injection 0-5 Units, 0-5 Units, Subcutaneous, TID AC, Bell Daly MD  •  insulin lispro (humaLOG) injection 2 Units, 2 Units, Subcutaneous, TID With Meals, Bell Daly MD  •  lacosamide (VIMPAT) tablet 100 mg, 100 mg, Oral, Q12H, Zac Lewis MD, 100 mg at 07/03/20 0827  •  latanoprost (XALATAN) 0.005 % ophthalmic solution 1 drop, 1 drop, Both Eyes, Nightly, Satnam Lewis MD, 1 drop at 07/02/20 2015  •  levothyroxine (SYNTHROID, LEVOTHROID) tablet 175 mcg, 175 mcg, Oral, Q AM, Rodrigo Van MD, 175 mcg at 07/03/20 0556  •  melatonin tablet 5 mg, 5 mg, Oral, Nightly, Blayne Souza MD, 5 mg at 07/02/20 2015  •  metoprolol tartrate (LOPRESSOR) injection 5 mg, 5 mg, Intravenous, Q6H PRN, Satnam Lewis MD  •  nystatin (MYCOSTATIN) powder, , Topical, Q12H, Satnam Lewis MD  •  ondansetron (ZOFRAN) injection 4 mg, 4 mg, Intravenous, Q6H PRN, Satnam Lewis MD  •  potassium chloride (MICRO-K) CR capsule 40 mEq, 40 mEq, Oral, PRN **OR** potassium chloride (KLOR-CON) packet 40 mEq, 40 mEq, Oral, PRN, 40 mEq at 06/17/20 0021 **OR** potassium chloride 10 mEq in  100 mL IVPB, 10 mEq, Intravenous, Q1H PRN, Satnam Lewis MD  •  potassium phosphate 45 mmol in sodium chloride 0.9 % 500 mL infusion, 45 mmol, Intravenous, PRN **OR** potassium phosphate 30 mmol in sodium chloride 0.9 % 250 mL infusion, 30 mmol, Intravenous, PRN, Last Rate: 31.3 mL/hr at 06/18/20 0635, 30 mmol at 06/18/20 0635 **OR** potassium phosphate 15 mmol in sodium chloride 0.9 % 100 mL infusion, 15 mmol, Intravenous, PRN **OR** sodium phosphates 40 mmol in sodium chloride 0.9 % 500 mL IVPB, 40 mmol, Intravenous, PRN **OR** sodium phosphates 20 mmol in sodium chloride 0.9 % 250 mL IVPB, 20 mmol, Intravenous, PRN, Satnam Lewis MD  •  risperiDONE (risperDAL) tablet 0.5 mg, 0.5 mg, Oral, Q12H, Lv Truong MD, 0.5 mg at 07/03/20 0827  •  sennosides-docusate (PERICOLACE) 8.6-50 MG per tablet 1 tablet, 1 tablet, Oral, BID PRN, Satnam Lewis MD  •  [COMPLETED] Insert peripheral IV, , , Once **AND** sodium chloride 0.9 % flush 10 mL, 10 mL, Intravenous, PRN, Satnam Lewis MD  •  sodium chloride 0.9 % flush 10 mL, 10 mL, Intravenous, Q12H, Satnam Lewis MD, 10 mL at 07/03/20 0827  •  sodium chloride 0.9 % flush 10 mL, 10 mL, Intravenous, PRN, Satnam Lewis MD  •  sodium chloride tablet 2 g, 2 g, Oral, BID With Meals, Blayne Souza MD, 2 g at 07/03/20 0828  •  timolol (TIMOPTIC) 0.25 % ophthalmic solution 1 drop, 1 drop, Both Eyes, Q12H, Satnam Lewis MD, 1 drop at 07/03/20 0831    Assessment/Plan     Active Hospital Problems    Diagnosis  POA   • Encephalopathy acute [G93.40]  Yes      Resolved Hospital Problems   No resolved problems to display.     Type 1 diabetes mellitus-severely uncontrolled with extreme variability  C-peptide levels are low  Pending ismael 65 levels.  Discussed with Dr. Lutz over the phone and also with the nurse regarding his blood sugar trends.  Will be conservative with his blood sugar control.  At this point if the patient is  "running anywhere between 200s-low 300s it is okay as at this point important priority is to prevent the low blood sugars.  Change Lantus to 15 units in the morning and 10 units at bedtime  Placed holding parameters for the bedtime  Change Humalog to 2 units with each meals plus holding parameters  Change the sliding scale to a low sliding scale      Patient is at high risk for hypoglycemia and hypoglycemia related complications.    Discussed plan with Nurse.     Bell Daly MD.  07/03/20  2:18 PM      EMR Dragon / transcription disclaimer:    \"Dictated utilizing Dragon dictation\".   "

## 2020-07-03 NOTE — PLAN OF CARE
Problem: Patient Care Overview  Goal: Plan of Care Review  Flowsheets (Taken 7/3/2020 5526)  Plan of Care Reviewed With: patient  Outcome Summary: Re-eval of swallow completed. REC diet advancement to soft, chopped diet/NTL. Upright for PO. Meds whole with puree. Small bites and sips.

## 2020-07-03 NOTE — PROGRESS NOTES
Late entry- 1757- received call from Anai on 5N regarding difficulty finding patient transport to [facility] since patients brother declined taking him due to work. Anai advised she tried [facility], however they are unable to transport this late. Call placed to brother to confirm he is unable to transport patient and he also advised he has no money to arrange transport. Call placed to [facility] and staff advised they have no transport available. Call placed to [facility] w/c van service to schedule ride- they advised they can  at 1915; Call placed to Lela MCGINNIS To confirm this was ok and we provide voucher. Call placed to  again to determine if patient is safe to transport via w/c van; he advised he was before admission but unsure of his condition now- he recommended we ask patient. Will further assess patient to determine safety. Upon arrival to the ED, patient is alert, discussed with patient his awareness that he is going to a rehab facility and he is aware and agreeable; advised  that facility staff should get patient upon his arrival. Call placed to [facility]- spoke w/Willis who noted he will escort patient in upon arrival as patient has left as of 1920. Elizabeth Majano RN

## 2020-07-03 NOTE — PLAN OF CARE
Problem: Patient Care Overview  Goal: Plan of Care Review  Flowsheets (Taken 7/3/2020 8766)  Plan of Care Reviewed With: patient  Outcome Summary: Pt alert and agrees to OT today. Pt with increased independence and less vc's for UE dress and grooming task today.  Pt continues to require increased time for processing and completing task.   Pt wears mask this encounter. Therapist wears mask, gloves and eye protection. Hand hygiene completed before and after session

## 2020-07-03 NOTE — NURSING NOTE
Patient with cognitive deficits and therefore have concern about his discharge plan of home alone. Discussed patient with Dr. Ibarra. Because he must be independent at discharge with intermittent assistance only, she recommended a longer subacute stay. Will sign off.    Beatriz Whitlock RN  Rehab Admissions Nurse  [phone]

## 2020-07-03 NOTE — THERAPY RE-EVALUATION
Acute Care - Speech Language Pathology   Swallow Re-Evaluation [facility]     Patient Name: Anthony Yang  : 1965  MRN: [id number]  Today's Date: 7/3/2020               Admit Date: 2020    Visit Dx:     ICD-10-CM ICD-9-CM   1. Seizure (CMS/HCC) R56.9 780.39   2. Encephalopathy acute G93.40 348.30   3. Leukocytosis, unspecified type D72.829 288.60   4. Elevated procalcitonin R79.89 790.99     Patient Active Problem List   Diagnosis   • Type 2 diabetes mellitus without complication, with long-term current use of insulin (CMS/HCC)   • Type 2 diabetes mellitus without complication (CMS/HCC)   • Hypothyroidism   • Hyperlipidemia   • Hypertension   • Encephalopathy acute     Past Medical History:   Diagnosis Date   • Cataract    • Diabetes mellitus (CMS/HCC)     type 1   • Glaucoma    • Neuropathy    • Seizures (CMS/HCC)      Past Surgical History:   Procedure Laterality Date   • EYE SURGERY     • JOINT REPLACEMENT          SWALLOW EVALUATION (last 72 hours)      SLP Adult Swallow Evaluation     Row Name 20 1300 20 0958                Rehab Evaluation    Document Type  re-evaluation  -CP  re-evaluation  -LN       Subjective Information  no complaints  -CP  no complaints  -LN       Patient Observations  alert;cooperative  -CP  alert;cooperative  -LN       Patient Effort  good  -CP  good  -LN       Symptoms Noted During/After Treatment  none  -CP  none  -LN          General Information    Patient Profile Reviewed  yes  -CP  yes  -LN       Pertinent History Of Current Problem  --  encephalopathy, 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, West Nile virus, respiratory failure (w/ intubation); h/o seizure d/o, legally blind  -LN       Current Method of Nutrition  pureed with some mashed;nectar/syrup-thick liquids  -CP  pureed with some mashed;honey-thick liquids  -LN       Precautions/Limitations, Vision  --  WFL with corrective lenses  -LN       Precautions/Limitations, Hearing  --  WFL;for purposes of eval  -LN        Plans/Goals Discussed with  patient;agreed upon  -CP  patient;agreed upon  -LN       Barriers to Rehab  medically complex  -CP  cognitive status  -LN       Patient's Goals for Discharge  return to regular diet  -CP  --          Pain Scale: Numbers Pre/Post-Treatment    Pain Scale: Numbers, Pretreatment  0/10 - no pain  -CP  0/10 - no pain  -LN       Pain Scale: Numbers, Post-Treatment  0/10 - no pain  -CP  0/10 - no pain  -LN          Oral Motor and Function    Dentition Assessment  --  natural, present and adequate  -LN       Secretion Management  --  WNL/WFL  -LN       Mucosal Quality  --  moist, healthy  -LN          Oral Musculature and Cranial Nerve Assessment    Oral Motor General Assessment  --  generalized oral motor weakness  -LN          General Eating/Swallowing Observations    Respiratory Support Currently in Use  --  room air  -LN       Eating/Swallowing Skills  self-fed  -CP  self-fed;requires cueing for compensatory strategies and precautions  -LN       Positioning During Eating  upright in chair  -CP  upright 90 degree;upright in bed  -LN       Utensils Used  spoon;cup  -CP  spoon;cup  -LN       Consistencies Trialed  thin liquids;nectar/syrup-thick liquids;pureed;soft textures;regular textures  -CP  pureed with some mashed;thin liquids;nectar/syrup-thick liquids;honey-thick liquids  -LN          Respiratory    Respiratory Status  WFL  -CP  --          Clinical Swallow Eval    Oral Prep Phase  WFL  -CP  --       Oral Transit  WFL  -CP  --       Oral Residue  WFL  -CP  --       Pharyngeal Phase  suspected pharyngeal impairment  -CP  --       Esophageal Phase  unremarkable  -CP  --       Clinical Swallow Evaluation Summary  Re-eval completed. Wet voicing noted with thin liquids. No s/s with NTL, pureed, soft, or dry solid. Mastication slightly slow but functional. Pt to d/c this pm to SNF. REC diet advancement to soft, chopped diet/NTL. Upright for PO. Meds whole with puree. Small bites and sips.    -CP  Swallow re-evaluation this date. Pt given trials of puree with some mashed, thins (water was initially presented), NTL via cup, and HTL via cup. Pt with Cortrak in place. Mastication of fork mashed consistency was slow but adequate. Wet vocal quality noted on x2/5 trials of water by cup, though no clinical s/s of aspiration with NTL or HTL trials. Vocal quality also remained clear, post swallow with NTL and HTL trials. Laryngeal elevation was reduced upon palpation and delayed swallow initiation noted with all consistencies. Recommend continuation of fork mashed diet but an uprgrade to NTL. Continue recommendations for no straws. Oral meds can be taken crushed in puree, as tolerated.   -LN          Clinical Impression    SLP Swallowing Diagnosis  suspected pharyngeal dysfunction  -CP  mild;oral dysfunction;suspected pharyngeal dysfunction  -LN       Functional Impact  risk of aspiration/pneumonia  -CP  risk of aspiration/pneumonia  -LN       Rehab Potential/Prognosis, Swallowing  good, to achieve stated therapy goals  -CP  good, to achieve stated therapy goals  -LN       Swallow Criteria for Skilled Therapeutic Interventions Met  demonstrates skilled criteria  -CP  demonstrates skilled criteria  -LN          Recommendations    Therapy Frequency (Swallow)  PRN  -CP  PRN  -LN       Predicted Duration Therapy Intervention (Days)  until discharge  -CP  until discharge  -LN       SLP Diet Recommendation  soft textures;chopped;nectar thick liquids  -CP  puree with some mashed;nectar thick liquids  -LN       Recommended Diagnostics  reassess via clinical swallow evaluation  -CP  reassess via clinical swallow evaluation  -LN       Recommended Precautions and Strategies  upright posture during/after eating;small bites of food and sips of liquid;no straw  -CP  upright posture during/after eating;small bites of food and sips of liquid;no straw;check mouth frequently for oral residue/pocketing  -LN       SLP Rec. for  Method of Medication Administration  meds whole;meds crushed;with pudding or applesauce  -CP  meds crushed;with pudding or applesauce;as tolerated  -LN       Monitor for Signs of Aspiration  yes;notify SLP if any concerns  -CP  yes;gurgly voice;throat clearing;cough;notify SLP if any concerns  -LN       Anticipated Dischage Disposition (SLP)  skilled nursing facility  -CP  --          Swallow Goals (SLP)    Oral Nutrition/Hydration Goal Selection (SLP)  --  oral nutrition/hydration, SLP goal 1  -LN          Oral Nutrition/Hydration Goal 1 (SLP)    Oral Nutrition/Hydration Goal 1, SLP  --  Pt will tolerate least restrictive diet  -LN       Time Frame (Oral Nutrition/Hydration Goal 1, SLP)  --  by discharge  -LN       Progress/Outcomes (Oral Nutrition/Hydration Goal 1, SLP)  --  good progress toward goal  -LN         User Key  (r) = Recorded By, (t) = Taken By, (c) = Cosigned By    Initials Name Effective Dates    CP Cesilia Guo, MS CCC-SLP 06/08/18 -     LN Diana Hoffman 12/17/19 -           EDUCATION  The patient has been educated in the following areas:   Dysphagia (Swallowing Impairment) Oral Care/Hydration Modified Diet Instruction.    SLP Recommendation and Plan  SLP Swallowing Diagnosis: suspected pharyngeal dysfunction  SLP Diet Recommendation: soft textures, chopped, nectar thick liquids  Recommended Precautions and Strategies: upright posture during/after eating, small bites of food and sips of liquid, no straw  SLP Rec. for Method of Medication Administration: meds whole, meds crushed, with pudding or applesauce     Monitor for Signs of Aspiration: yes, notify SLP if any concerns  Recommended Diagnostics: reassess via clinical swallow evaluation  Swallow Criteria for Skilled Therapeutic Interventions Met: demonstrates skilled criteria  Anticipated Dischage Disposition (SLP): skilled nursing facility  Rehab Potential/Prognosis, Swallowing: good, to achieve stated therapy goals  Therapy Frequency  (Swallow): PRN  Predicted Duration Therapy Intervention (Days): until discharge       Plan of Care Reviewed With: patient  Outcome Summary: Re-eval of swallow completed. REC diet advancement to soft, chopped diet/NTL. Upright for PO. Meds whole with puree. Small bites and sips.    SLP GOALS     Row Name 07/02/20 0958             Oral Nutrition/Hydration Goal 1 (SLP)    Oral Nutrition/Hydration Goal 1, SLP  Pt will tolerate least restrictive diet  -LN      Time Frame (Oral Nutrition/Hydration Goal 1, SLP)  by discharge  -LN      Progress/Outcomes (Oral Nutrition/Hydration Goal 1, SLP)  good progress toward goal  -LN        User Key  (r) = Recorded By, (t) = Taken By, (c) = Cosigned By    Initials Name Provider Type    LN Diana Hoffman Speech and Language Pathologist           SLP Outcome Measures (last 72 hours)      SLP Outcome Measures     Row Name 07/03/20 1300             SLP Outcome Measures    Outcome Measure Used?  Adult NOMS  -CP         Adult FCM Scores    FCM Chosen  Swallowing  -CP      Swallowing FCM Score  4  -CP        User Key  (r) = Recorded By, (t) = Taken By, (c) = Cosigned By    Initials Name Effective Dates    CP Cesilia Guo MS CCC-SLP 06/08/18 -            Time Calculation:   Time Calculation- SLP     Row Name 07/03/20 1335             Time Calculation- SLP    SLP Start Time  1230  -CP      SLP Stop Time  1315  -CP      SLP Time Calculation (min)  45 min  -CP      SLP Received On  07/03/20  -CP        User Key  (r) = Recorded By, (t) = Taken By, (c) = Cosigned By    Initials Name Provider Type    CP Cesilia Guo MS CCC-SLP Speech and Language Pathologist          Therapy Charges for Today     Code Description Service Date Service Provider Modifiers Qty    [id number]  ST TREATMENT SWALLOW 3 7/3/2020 Cesilia Guo MS CCC-SLP GN 1               Cesilia Guo MS CCC-SLP  7/3/2020

## 2020-07-03 NOTE — PLAN OF CARE
Problem: Patient Care Overview  Goal: Plan of Care Review  Flowsheets (Taken 7/3/2020 5406)  Progress: improving  Plan of Care Reviewed With: patient  Outcome Summary: Patient continues to progress towards goals with PT. Patient performed bed mobility with SV and ambulated a total of 210' SBA without any AD today. No new complaints and oriented x 3 as well as followed commands well. Noted cognition deficits and increased processing time required to complete tasks. Will continue to advance as able. Encouraged patient to ambulate 3x/day with nsg in addition to PT to maintain functional status.    Patient was wearing a face mask during this therapy encounter. Therapist used appropriate personal protective equipment including mask and gloves.  Mask used was standard procedure mask. Appropriate PPE was worn during the entire therapy session. Hand hygiene was completed before and after therapy session. Patient is not in enhanced droplet precautions.

## 2020-07-03 NOTE — THERAPY TREATMENT NOTE
Acute Care - Occupational Therapy Progress Note  [facility]     Patient Name: Anthony Yang  : 1965  MRN: [id number]  Today's Date: 7/3/2020             Admit Date: 2020       ICD-10-CM ICD-9-CM   1. Seizure (CMS/HCC) R56.9 780.39   2. Encephalopathy acute G93.40 348.30   3. Leukocytosis, unspecified type D72.829 288.60   4. Elevated procalcitonin R79.89 790.99     Patient Active Problem List   Diagnosis   • Type 2 diabetes mellitus without complication, with long-term current use of insulin (CMS/HCC)   • Type 2 diabetes mellitus without complication (CMS/HCC)   • Hypothyroidism   • Hyperlipidemia   • Hypertension   • Encephalopathy acute     Past Medical History:   Diagnosis Date   • Cataract    • Diabetes mellitus (CMS/HCC)     type 1   • Glaucoma    • Neuropathy    • Seizures (CMS/HCC)      Past Surgical History:   Procedure Laterality Date   • EYE SURGERY     • JOINT REPLACEMENT         Therapy Treatment    Rehabilitation Treatment Summary     Row Name 20 1524             Treatment Time/Intention    Discipline  occupational therapist  -SG      Subjective Information  no complaints  -SG      Mode of Treatment  individual therapy;occupational therapy  -SG      Recorded by [SG] Lizzy Craig OTR 20 1527      Row Name 20 1524             Cognitive Assessment/Intervention- PT/OT    Orientation Status (Cognition)  person;place  -SG      Recorded by [SG] Lizzy Craig OTR 20 1527      Row Name 20 1524             ADL Assessment/Intervention    BADL Assessment/Intervention  upper body dressing;grooming  -SG      Recorded by [SG] Lizzy Craig OTR 20 1527      Row Name 20 1524             Upper Body Dressing Assessment/Training    Upper Body Dressing Craig Level  upper body dressing skills;doff;don;minimum assist (75% patient effort)  -SG      Upper Body Dressing Position  supported sitting sitting in chair  -SG      Recorded by [SG]  Lizzy Craig, OTR 07/03/20 1527      Row Name 07/03/20 1524             Grooming Assessment/Training    [place] Level (Grooming)  minimum assist (75% patient effort)  -SG      Grooming Position  supported sitting sitting in chair  -SG      Recorded by [SG] Lizzy Craig OTR 07/03/20 1527      Row Name 07/03/20 1524             Positioning and Restraints    Pre-Treatment Position  sitting in chair/recliner  -SG      Post Treatment Position  chair  -SG      In Chair  reclined;call light within reach;encouraged to call for assist;exit alarm on  -SG      Recorded by [SG] Lizzy Craig OTR 07/03/20 1527      Row Name 07/03/20 1524             Pain Scale: Numbers Pre/Post-Treatment    Pain Scale: Numbers, Pretreatment  0/10 - no pain  -SG      Pain Scale: Numbers, Post-Treatment  0/10 - no pain  -SG      Recorded by [SG] Lizzy Craig, OTR 07/03/20 1527        User Key  (r) = Recorded By, (t) = Taken By, (c) = Cosigned By    Initials Name Effective Dates Discipline     Lizzy Craig, OTR 06/08/18 -  OT             Occupational Therapy Education                 Title: PT OT SLP Therapies (In Progress)     Topic: Occupational Therapy (Not Started)     Point: ADL training (Not Started)     Description:   Instruct learner(s) on proper safety adaptation and remediation techniques during self care or transfers.   Instruct in proper use of assistive devices.              Learner Progress:   Not documented in this visit.          Point: Home exercise program (Not Started)     Description:   Instruct learner(s) on appropriate technique for monitoring, assisting and/or progressing therapeutic exercises/activities.              Learner Progress:   Not documented in this visit.          Point: Precautions (Not Started)     Description:   Instruct learner(s) on prescribed precautions during self-care and functional transfers.              Learner Progress:   Not documented in this visit.          Point: Body  mechanics (Not Started)     Description:   Instruct learner(s) on proper positioning and spine alignment during self-care, functional mobility activities and/or exercises.              Learner Progress:   Not documented in this visit.                            OT Recommendation and Plan     Plan of Care Review  Plan of Care Reviewed With: patient  Plan of Care Reviewed With: patient  Outcome Summary: Pt alert and agrees to OT today. Pt with increased independence and less vc's for UE dress and grooming task today.  Pt continues to require increased time for processing and completing task.  Outcome Measures     Row Name 07/03/20 1530 07/02/20 1540 07/01/20 1520       How much help from another is currently needed...    Putting on and taking off regular lower body clothing?  2  -SG  2  -KP  2  -SG    Bathing (including washing, rinsing, and drying)  2  -SG  2  -KP  2  -SG    Toileting (which includes using toilet bed pan or urinal)  2  -SG  2  -KP  2  -SG    Putting on and taking off regular upper body clothing  2  -SG  2  -KP  2  -SG    Taking care of personal grooming (such as brushing teeth)  3  -SG  2  -KP  2  -SG    Eating meals  3  -SG  3  -KP  1  -SG    AM-PAC 6 Clicks Score (OT)  14  -SG  13  -KP  11  -SG       Functional Assessment    Outcome Measure Options  --  AM-PAC 6 Clicks Daily Activity (OT)  -KP  --      User Key  (r) = Recorded By, (t) = Taken By, (c) = Cosigned By    Initials Name Provider Type    SG Lizzy Craig, OTR Occupational Therapist     Randee Escalona, OTR Occupational Therapist           Time Calculation:   Time Calculation- OT     Row Name 07/03/20 1530             Time Calculation- OT    OT Start Time  1504  -      OT Stop Time  1515  -      OT Time Calculation (min)  11 min  -      Total Timed Code Minutes- OT  11 minute(s)  -SG      OT Received On  07/03/20  -      OT - Next Appointment  07/06/20  -        User Key  (r) = Recorded By, (t) = Taken By, (c) =  Cosigned By    Initials Name Provider Type    SG Lizzy Craig OTR Occupational Therapist        Therapy Charges for Today     Code Description Service Date Service Provider Modifiers Qty    33443064003 HC OT SELF CARE/MGMT/TRAIN EA 15 MIN 7/3/2020 Lizzy Craig OTR GO 1               EDNA Johnson  7/3/2020

## 2020-07-03 NOTE — THERAPY TREATMENT NOTE
Patient Name: Anthony Yang  : 1965    MRN: [id number]                              Today's Date: 7/3/2020       Admit Date: 2020    Visit Dx:     ICD-10-CM ICD-9-CM   1. Encephalopathy acute G93.40 348.30   2. Leukocytosis, unspecified type D72.829 288.60   3. Elevated procalcitonin R79.89 790.99   4. Seizure (CMS/HCC) R56.9 780.39     Patient Active Problem List   Diagnosis   • Type 2 diabetes mellitus without complication, with long-term current use of insulin (CMS/[facility])   • Type 2 diabetes mellitus without complication (CMS/[facility])   • Hypothyroidism   • Hyperlipidemia   • Hypertension   • Encephalopathy acute     Past Medical History:   Diagnosis Date   • Cataract    • Diabetes mellitus (CMS/[facility])     type 1   • Glaucoma    • Neuropathy    • Seizures (CMS/[facility])      Past Surgical History:   Procedure Laterality Date   • EYE SURGERY     • JOINT REPLACEMENT       General Information     Row Name 20          PT Evaluation Time/Intention    Document Type  therapy note (daily note)  -MS     Mode of Treatment  physical therapy  -MS     Row Name 20          General Information    Existing Precautions/Restrictions  fall;seizures  -MS     Row Name 20          Cognitive Assessment/Intervention- PT/OT    Orientation Status (Cognition)  oriented to;person;place;time  -MS       User Key  (r) = Recorded By, (t) = Taken By, (c) = Cosigned By    Initials Name Provider Type    MS AnneEileen, PT Physical Therapist        Mobility     Row Name 20          Bed Mobility Assessment/Treatment    Supine-Sit [place] (Bed Mobility)  supervision;verbal cues  -MS     Comment (Bed Mobility)  SV for sitting balance. Gait belt donned for safety.  -MS     Row Name 20          Transfer Assessment/Treatment    Comment (Transfers)  Mildy impulsive but corrected when cued.  -MS     Row Name 20          Sit-Stand Transfer    Sit-Stand Anderson  (Transfers)  stand by assist;verbal cues  -MS     Assistive Device (Sit-Stand Transfers)  -- no AD  -MS     Row Name 07/03/20 0919          Gait/Stairs Assessment/Training    Gait/Stairs Assessment/Training  gait/ambulation independence  -MS     [place] Level (Gait)  stand by assist;verbal cues  -MS     Assistive Device (Gait)  -- no AD  -MS     Distance in Feet (Gait)  210  -MS     Deviations/Abnormal Patterns (Gait)  gait speed decreased;stride length decreased;nicole decreased  -MS     Comment (Gait/Stairs)  Slowed nicole, no LOB or veering noted. Denied any sx during ambulation.  -MS       User Key  (r) = Recorded By, (t) = Taken By, (c) = Cosigned By    Initials Name Provider Type    Eileen Chou, PT Physical Therapist        Obj/Interventions     Row Name 07/03/20 0920          Static Sitting Balance    Level of [place] (Unsupported Sitting, Static Balance)  supervision  -MS     Sitting Position (Unsupported Sitting, Static Balance)  sitting on edge of bed  -MS     Row Name 07/03/20 0920          Static Standing Balance    Level of [place] (Supported Standing, Static Balance)  standby assist  -MS     Assistive Device Utilized (Supported Standing, Static Balance)  -- no AD  -MS       User Key  (r) = Recorded By, (t) = Taken By, (c) = Cosigned By    Initials Name Provider Type    Eileen Chou, PT Physical Therapist        Goals/Plan    No documentation.       Clinical Impression     Row Name 07/03/20 0920          Pain Scale: Numbers Pre/Post-Treatment    Pain Scale: Numbers, Pretreatment  0/10 - no pain  -MS     Row Name 07/03/20 0920          Plan of Care Review    Plan of Care Reviewed With  patient  -MS     Progress  improving  -MS     Outcome Summary  Patient continues to progress towards goals with PT. Patient performed bed mobility with SV and ambulated a total of 210' SBA without any AD today. No new complaints and oriented x 3 as well as followed commands well. Noted  cognition deficits  and increased processing time required to complete tasks. Will continue to advance as able. Encouraged patient to ambulate 3x/day with nsg in addition to PT to maintain functional status.  -MS     Row Name 07/03/20 0920          Vital Signs    O2 Delivery Pre Treatment  room air  -MS     Row Name 07/03/20 0920          Positioning and Restraints    Pre-Treatment Position  in bed  -MS     Post Treatment Position  chair  -MS     In Chair  reclined;call light within reach;encouraged to call for assist;exit alarm on  -MS       User Key  (r) = Recorded By, (t) = Taken By, (c) = Cosigned By    Initials Name Provider Type    MS Eileen Anne, PT Physical Therapist        Outcome Measures     Row Name 07/03/20 0922          How much help from another person do you currently need...    Turning from your back to your side while in flat bed without using bedrails?  4  -MS     Moving from lying on back to sitting on the side of a flat bed without bedrails?  4  -MS     Moving to and from a bed to a chair (including a wheelchair)?  3  -MS     Standing up from a chair using your arms (e.g., wheelchair, bedside chair)?  3  -MS     Climbing 3-5 steps with a railing?  3  -MS     To walk in hospital room?  3  -MS     AM-PAC 6 Clicks Score (PT)  20  -MS       User Key  (r) = Recorded By, (t) = Taken By, (c) = Cosigned By    Initials Name Provider Type    MS AnneEileen, PT Physical Therapist        Physical Therapy Education                 Title: PT OT SLP Therapies (In Progress)     Topic: Physical Therapy (Done)     Point: Mobility training (Done)     Description:   Instruct learner(s) on safety and technique for assisting patient out of bed, chair or wheelchair.  Instruct in the proper use of assistive devices, such as walker, crutches, cane or brace.              Patient Friendly Description:   It's important to get you on your feet again, but we need to do so in a way that is safe for you. Falling  has serious consequences, and your personal safety is the most important thing of all.        When it's time to get out of bed, one of us or a family member will sit next to you on the bed to give you support.     If your doctor or nurse tells you to use a walker, crutches, a cane, or a brace, be sure you use it every time you get out of bed, even if you think you don't need it.    Learning Progress Summary           Patient Acceptance, E,D, VU,DU by  at 7/1/2020 1304    Acceptance, E,D, VU,NR by  at 6/30/2020 1045    Acceptance, E,D, NR,VU by  at 6/29/2020 1429    Acceptance, E,TB,D, VU,NR by  at 6/28/2020 1216                   Point: Home exercise program (Done)     Description:   Instruct learner(s) on appropriate technique for monitoring, assisting and/or progressing patient with therapeutic exercises and activities.              Learning Progress Summary           Patient Acceptance, E,D, VU,DU by  at 7/1/2020 1304    Acceptance, E,D, VU,NR by  at 6/30/2020 1045    Acceptance, E,D, NR,VU by  at 6/29/2020 1429    Acceptance, E,TB,D, VU,NR by  at 6/28/2020 1216                               User Key     Initials Effective Dates Name Provider Type Discipline     04/03/18 -  Karen Moss, PT Physical Therapist PT     08/20/19 -  Breanne Mccann PTA Physical Therapy Assistant PT              PT Recommendation and Plan     Plan of Care Reviewed With: patient  Progress: improving  Outcome Summary: Patient continues to progress towards goals with PT. Patient performed bed mobility with SV and ambulated a total of 210' SBA without any AD today. No new complaints and oriented x 3 as well as followed commands well. Noted cognition deficits  and increased processing time required to complete tasks. Will continue to advance as able. Encouraged patient to ambulate 3x/day with nsg in addition to PT to maintain functional status.     Time Calculation:   PT Charges     Row Name 07/03/20 0169              Time Calculation    Start Time  0836  -MS      Stop Time  0855  -MS      Time Calculation (min)  19 min  -MS      PT Received On  07/03/20  -MS      PT - Next Appointment  07/06/20  -MS        User Key  (r) = Recorded By, (t) = Taken By, (c) = Cosigned By    Initials Name Provider Type    Eileen Chou, PT Physical Therapist        Therapy Charges for Today     Code Description Service Date Service Provider Modifiers Qty    87470987463 HC PT THER PROC EA 15 MIN 7/3/2020 Eileen Anne, PT GP 1    40577114160 HC PT THER SUPP EA 15 MIN 7/3/2020 Eileen Anne, PT GP 1          PT G-Codes  Outcome Measure Options: AM-PAC 6 Clicks Daily Activity (OT)  AM-PAC 6 Clicks Score (PT): 20  AM-PAC 6 Clicks Score (OT): 13    Eileen Anne PT  7/3/2020

## 2020-07-06 LAB
GAD65 AB SER-ACNC: ABNORMAL U/ML (ref 0–5)
T4 FREE SERPL DIALY-MCNC: 0.81 NG/DL

## 2020-07-06 NOTE — PROGRESS NOTES
Case Management Discharge Note      Final Note: Patient Dc'd to [facility]    Provided Post Acute Provider List?: Yes  Post Acute Provider List: Nursing Home, Inpatient Rehab  Provided Post Acute Provider Quality & Resource List?: Yes  Post Acute Provider Quality and Resource List: Nursing Home  Delivered To: Patient  Method of Delivery: In person    Destination - Selection Complete      Service Provider Request Status Selected Services Address Phone Number Fax Number    [facility] Selected Skilled Nursing [facility] [facility] [address] [phone]      Transportation Services  W/C Van: Other([employer])    Final Discharge Disposition Code: 03 - skilled nursing facility (SNF)

## 2020-07-06 NOTE — PAYOR COMM NOTE
"Noah Madden (54 y.o. Male)     PLEASE SEE ATTACHED DC SUMMARY    REF#[id number]    THANK YOU    REYMUNDO MASTERS LPN CCP    Date of Birth Social Security Number Address Home Phone MRN    1965  [id number] [facility]   [facility] [phone] [id number]    Oriental orthodox Marital Status          Unknown Single       Admission Date Admission Type Admitting Provider Attending Provider Department, Room/Bed    20 Emergency Phil Barker MD  [address], [address]/1    Discharge Date Discharge Disposition Discharge Destination        7/3/2020 Rehab Facility or Unit (DC - External)              Attending Provider:  (none)   Allergies:  Amoxicillin, Flonase [Fluticasone], Penicillins    Isolation:  None   Infection:  None   Code Status:  Prior    Ht:  167.6 cm (65.98\")   Wt:  91.5 kg (201 lb 11.2 oz)    Admission Cmt:  None   Principal Problem:  None                Active Insurance as of 2020     Primary Coverage     Payor Plan Insurance Group Employer/Plan Group    [employer] [place] [employer] [place]      Payor Plan Address Payor Plan Phone Number Payor Plan Fax Number Effective Dates    [address] [address] [phone]  10/1/2019 - None Entered    [facility] [id number]       Subscriber Name Subscriber Birth Date Member ID       NOAH MADDEN 1965 [id number]                 Emergency Contacts      (Rel.) Home Phone Work Phone Mobile Phone    LUDMILA MADDEN (Brother) -- -- [phone]               Discharge Summary      Rick Lutz MD at 20 1051            DISCHARGE SUMMARY    Patient Name: Noah Madden  Age/Sex: 54 y.o. 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 male.  Brought in by EMS after apparently being found down by his brother last heard from him 5 days ago attempts to contact him 2 days ago unsuccessful.  Apparently he is a type I diabetic with a history of seizures neuropathy and glaucoma.  Patient has been unresponsive to commands he is uncooperative but he is been flapping about the try to do an LP but he was just in too much constant motion even with sedatives.  He was sent up to the ICU.  I really have no other history on him.  His brother confirmed this if not known any episode like this before he is not been ever known to use drugs or alcohol he is an indoor person he does not get outside that he will have exposure to bugs or ticks etc.    Hospital Course:   Anthony Yang presented to [facility] with complaints of being found unresponsive for unspecified amount of time, likely more than couple of days.  He was in a coma, got intubated was on the mechanical ventilator with aspiration pneumonia.  Has extensive work-up by neurology, was ruled out for meningitis, and was suspected to be either hypoglycemia induced severe encephalopathy versus hypoxemia induced encephalopathy versus status epilepticus induced encephalopathy, given the lack of progress and based on the work-up he was eventually treated for the seizure, his blood sugar was controlled and he was treated with IVIG for presumed autoimmune encephalitis.  Despite his lack of improvement initially but after  several days the patient started to wake up and was eventually weaned and liberated from the ventilator, was working with physical therapy and he was evaluated by endocrinology, he is not actually type II he is rather type 1 diabetes mellitus and patient has very brittle diabetes and the goal at this point is to have a partial control keeping the blood sugar between 200-300 since he has high tendency to run very low number on his blood sugar.  His regimen was discussed with the endocrinologist and modified on the day of discharge to 15 units of Lantus to be taken every morning and then 10 units to be taken at night with instruction to hold the Lantus if the blood sugar is less than 180.  He is on 4 units scheduled regular insulin before each meal and on a low-dose sliding scale of insulin in addition to that.  Please see medication reconciliation form for more details.  Patient was started on the Vimpat for seizure control which will be continued per neurology recommendation  Patient had dysphagia which was getting better, nasogastric tube was removed and he is tolerating p.o. diet per speech therapy recommendation and will benefit from follow-up with speech therapy after discharge.  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  He has hypothyroidism and his dose was adjusted by endocrinology as well, he did have hyponatremia and patient did improve with the addition of the sodium pill.  He is on Risperdal scheduled twice a day that did help with the agitation and with the encephalopathy, may reassess to see if it needs to be used long-term especially if his mental status continues to improve.  He is not requiring any restraints with no agitation and no need for any PRN IV medication for that purpose as well.    Consults:   IP CONSULT TO NEUROLOGY  IP CONSULT TO PULMONOLOGY  IP CONSULT TO NEUROLOGY  IP  CONSULT TO INFECTIOUS DISEASES  IP CONSULT TO NUTRITION SERVICES  IP CONSULT TO CASE MANAGEMENT   IP CONSULT TO DIABETES EDUCATOR  IP CONSULT TO ENDOCRINOLOGY  IP CONSULT TO REHAB ADMISSION    Significant Discharge Diagnostics   Procedures Performed:         Pertinent Lab Results:  Results from last 7 days   Lab Units 07/03/20  0550 07/02/20  0531 07/01/20  0812 06/30/20  0533 06/29/20  0403 06/28/20  1054   SODIUM mmol/L 139 135* 134* 133* 127* 126*   POTASSIUM mmol/L 4.1 4.4 4.5 4.4 4.1 4.5   CHLORIDE mmol/L 104 101 98 99 95* 95*   CO2 mmol/L 26.3 28.2 24.4 25.4 25.6 22.2   BUN mg/dL 22* 22* 20 18 15 14   CREATININE mg/dL 0.69* 0.72* 0.74* 0.74* 0.62* 0.66*   GLUCOSE mg/dL 73 119* 253* 183* 172* 310*   CALCIUM mg/dL 9.2 9.3 9.0 9.2 8.9 8.5*         Results from last 7 days   Lab Units 07/03/20  0550 07/02/20  0531 07/01/20  0812 06/30/20  0533 06/29/20  0403 06/28/20  1054   WBC 10*3/mm3 4.61 5.00 4.09 3.68 3.81 4.13   HEMOGLOBIN g/dL 11.5* 11.3* 11.4* 11.6* 11.9* 12.5*   HEMATOCRIT % 34.0* 34.2* 34.1* 34.2* 35.7* 38.5   PLATELETS 10*3/mm3 332 330 345 319 340 300   MCV fL 88.3 86.6 87.4 86.6 87.3 89.3   MCH pg 29.9 28.6 29.2 29.4 29.1 29.0   MCHC g/dL 33.8 33.0 33.4 33.9 33.3 32.5   RDW % 15.8* 15.2 15.0 14.9 15.1 14.8   RDW-SD fl 50.0 47.8 48.0 46.0 48.5 48.2   MPV fL 11.1 11.3 11.2 11.2 11.1 10.2                                                           Imaging Results:  Imaging Results (All)     Procedure Component Value Units Date/Time    XR Abdomen KUB [[id number]] Collected:  06/30/20 1438     Updated:  06/30/20 1442    Narrative:       XR ABDOMEN KUB-     INDICATIONS: NG tube placement     TECHNIQUE: Supine views of the abdomen     COMPARISON: 06/16/2020     FINDINGS:     NG tube appears to extend to the duodenal jejunal junction region      The bowel gas pattern is nonspecific. No supine evidence for free  intraperitoneal gas.     Follow-up as clinically indicated.             Impression:           As described.     This report was finalized on 6/30/2020 2:39 PM by Dr. Johnny Springer M.D.       IR Lumbar Puncture Valentineg/Thera [[id number]] Collected:  06/23/20 1522     Updated:  06/23/20 1529    Narrative:       FLUOROSCOPIC LUMBAR PUNCTURE     Clinical: Autoimmune encephalitis     FLUOROSCOPY TIME: 2 minutes 18 seconds, 3 images.     Informed consent obtained.     Note: Patient was agitated and somewhat combative, opening and closing  pressures could not be taken.     PROCEDURE: The patient was placed prone on the fluoroscopy table. The  lumbar region was prepped and shape in the usual fashion. 1% lidocaine  was administered at the L5 level. Lumbar puncture at this location was  unsuccessful. Lidocaine was then administered at the L4-5 interspace.  Lumbar puncture could be performed at this location using an 18-gauge  spinal needle. The tap was bloody which partially cleared, this could be  traumatic for an earlier attempt was made at L5. Approximately 15 mL of  CSF was aspirated. No complications encountered.     CONCLUSION:  1. Due to the patient's condition, opening and closing pressures could  not be obtained.  2. Bloody spinal tap which partially cleared.     This report was finalized on 6/23/2020 3:26 PM by Dr. Bud Blevins M.D.       XR Chest 1 View [[id number]] Collected:  06/23/20 0904     Updated:  06/23/20 1252    Narrative:       XR CHEST 1 VW-     HISTORY: 54-year-old male with seizures. Encephalopathy. Diabetes.     FINDINGS: Apical lordotic projection. There is central pulmonary  vascular congestion, but there is no definite evidence for interstitial  edema. The projection limits evaluation of the lower lobes and no  definite consolidations are seen, but small pleural effusions cannot be  excluded. There is an NG tube within the stomach. Fully upright PA and  lateral views of the chest are recommended when the patient is able.     This report was finalized on 6/23/2020 12:49 PM by   Shania Lugo M.D.       MRI Brain Without Contrast [[id number]] Collected:  06/22/20 2058     Updated:  06/23/20 1039    Narrative:       MRI OF THE BRAIN WITHOUT CONTRAST     CLINICAL HISTORY: Altered mental status and seizures.     MRI of the brain is obtained with sagittal T1, axial T1, axial FLAIR,  axial T2, axial diffusion, and axial gradient echo images.     Comparison is made to previous MRI of the brain dated 06/13/2020.     FINDINGS:     The ventricles, sulci, and cisterns are age appropriate. There are no  abnormal areas of restricted diffusion. The midline intracranial anatomy  is within normal limits. Minor changes of chronic small vessel ischemic  phenomena are noted. The major intracranial flow related signal voids  are within normal limits. There are no abnormal areas of susceptibility  artifact.     Extensive fluid signal intensity is noted within the mastoid air cells  bilaterally. A similar extent of fluid signal intensity was identified  on the prior study. There is mucosal thickening appreciated within the  maxillary sinuses, the ethmoid air cells, the frontal sinus, and the  sphenoid sinus. The changes of inflammatory paranasal sinus disease are  more prominent on the current exam.       Impression:          There is no evidence for acute intracranial pathology. No potential  seizure focus is identified. However, if the intent of the examination  is to evaluate for a seizure focus, a contrast enhanced MRI of the brain  would be a more optimal study.     Extensive fluid signal intensity is seen within the mastoid air cells  bilaterally. Mild/moderate changes of inflammatory paranasal sinus  disease are noted as discussed above.     This report was finalized on 6/23/2020 10:36 AM by Dr. Michael Biggs M.D.       XR Abdomen KUB [[id number]] Collected:  06/16/20 2048     Updated:  06/16/20 2052    Narrative:       XR ABDOMEN KUB-  06/16/2020     HISTORY: Feeding tube placement.     The feeding tube is  seen with its tip overlying the left upper quadrant  probably in the body of the stomach. Bowel gas pattern is unremarkable.       Impression:       Feeding tube tip in the body of the stomach.     This report was finalized on 6/16/2020 8:49 PM by Dr. Thiago Lorenzo M.D.       MRI Brain With & Without Contrast [[id number]] Collected:  06/13/20 1929     Updated:  06/15/20 1033    Narrative:       BRAIN MRI WITHOUT AND WITH IV CONTRAST     HISTORY: Found down yesterday. Seizures. Diabetes.     TECHNIQUE: MRI of the brain was performed before and after IV gadolinium  administration. This is correlated with a head CT performed yesterday.     FINDINGS: There is normal caliber of the ventricular system and no  restricted diffusion. The brain parenchyma appears normal. Expected flow  voids are observed. The extra-axial spaces appear normal. There is no  abnormal contrast enhancement. There is some fluid in the mastoid air  cells. Paranasal sinuses are clear. Orbital structures appear normal.  The optic chiasm and pituitary appear grossly normal.       Impression:       Fluid in the mastoid air cells bilaterally. Otherwise  negative brain MRI.     This report was finalized on 6/13/2020 7:44 PM by Dr. Troy Orellana M.D.       XR Chest 1 View [[id number]] Collected:  06/14/20 0523     Updated:  06/14/20 0528    Narrative:       PORTABLE CHEST RADIOGRAPH     HISTORY: Respiratory failure     COMPARISON: 06/12/2020     FINDINGS:  Endotracheal tube terminates in satisfactory position. Weighted enteric  feeding tube extends into the proximal body of the stomach. Increasing  bibasilar consolidation is noted when compared to prior exam. There are  bilateral pleural effusions. No pneumothorax is seen.       Impression:       Worsening consolidation identified at the lung bases, as well as  suspected bilateral effusions.     This report was finalized on 6/14/2020 5:25 AM by Dr. Erica Luis M.D.       XR Abdomen KUB  [[id number]] Collected:  06/13/20 2253     Updated:  06/13/20 2258    Narrative:       KUB     HISTORY: Feeding tube placement     COMPARISON: None available.        FINDINGS:  Weighted enteric feeding tube extends into the proximal body of the  stomach. Endotracheal tube terminates in satisfactory position. There is  some increased density noted at the left lung base. Correlation with any  evidence for pneumonia is recommended. Visualized bowel gas pattern is  nonobstructive.       Impression:       Weighted enteric feeding tube extends into the proximal body of the  stomach.     This report was finalized on 6/13/2020 10:55 PM by Dr. Erica Luis M.D.       IR Lumbar Puncture Diag/Thera [[id number]] Collected:  06/12/20 1853     Updated:  06/12/20 1859    Narrative:       LUMBAR PUNCTURE FOR DIAGNOSIS UNDER FLUOROSCOPIC GUIDANCE     HISTORY: Confusion. Possible meningitis or encephalitis.     FINDINGS: Following sterile prep and local anesthetic, a 20-gauge needle  was advanced into the lumbar subarachnoid space at L3-4 by Dr. Danielle. The opening pressure is elevated, measuring 320 mm of water.  12 cc of clear colorless CSF was obtained and sent for studies as  requested. The closing pressure measures 250 mm of water.     The patient tolerated the procedure well and there were no immediate  complications. One image was obtained and the fluoroscopy time measures  3 seconds.     This report was finalized on 6/12/2020 6:56 PM by Dr. Esteban Danielle M.D.       XR Chest 1 View [[id number]] Collected:  06/12/20 1548     Updated:  06/12/20 1553    Narrative:       ONE VIEW PORTABLE CHEST AT 3:29 PM     HISTORY: Evaluate ET tube position.     FINDINGS: There has been recent insertion of an ET tube which ends 1.2  cm above the chris. The lungs are moderately expanded and grossly clear  and unchanged from 5 hours ago. The heart size is normal.     This report was finalized on 6/12/2020 3:50 PM by Dr. Orellana  KEMI Danielle       CT Head Without Contrast [[id number]] Collected:  06/12/20 1259     Updated:  06/12/20 1309    Narrative:       EMERGENCY NONCONTRAST HEAD CT 06/12/2020     CLINICAL HISTORY: Altered mental status. Patient was found down in  apartment.      TECHNIQUE: Spiral CT images were obtained from the base of the skull to  the vertex without intravenous contrast. Images were reformatted and  submitted in 3 mm thick axial CT section with brain algorithm and 2 mm  thick axial CT section with high-resolution bone algorithm and 2 mm  thick sagittal and coronal reconstructions were performed and submitted  in brain algorithm.       There are no prior studies from [facility] for  comparison.     FINDINGS: There is some streak artifact through the left side of the  mitch limiting evaluation. Overall the brain parenchyma is normal in  attenuation. The ventricles are normal in size. I see no focal mass  effect and no midline shift and no extra-axial fluid collections are  identified and there is no evidence of acute intracranial hemorrhage.  The paranasal sinuses are clear.  There is a small amount of fluid in  the mastoid air cells bilaterally.       Impression:       1. Small amount of fluid in the mastoid air cells bilaterally,  otherwise, this is a negative head CT and the etiology of the mental  status change is not established on this exam and if clinical symptoms  warrant an MRI of the brain could be obtained for more complete  assessment.       Radiation dose reduction techniques were utilized, including automated  exposure control and exposure modulation based on body size.     This report was finalized on 6/12/2020 1:06 PM by Dr. Triston Ayala M.D.       XR Chest 1 View [[id number]] Collected:  06/12/20 1207     Updated:  06/12/20 1225    Narrative:       ONE VIEW PORTABLE CHEST     HISTORY: Found down. Confusion. Diabetes.     FINDINGS: The lungs are well-expanded and clear and the heart is  top  normal in size. There is no acute disease.     This report was finalized on 6/12/2020 12:22 PM by Dr. Esteban Danielle M.D.             Objective:   Temp:  [98 °F (36.7 °C)-98.3 °F (36.8 °C)] 98.3 °F (36.8 °C)  Heart Rate:  [61-66] 61  Resp:  [18] 18  BP: (123-150)/(65-67) 123/65      on    Device (Oxygen Therapy): room air    Intake/Output Summary (Last 24 hours) at 7/3/2020 1052  Last data filed at 7/2/2020 1600  Gross per 24 hour   Intake 250 ml   Output --   Net 250 ml     Body mass index is 32.57 kg/m².      07/01/20  0606 07/02/20  0554 07/03/20  0626   Weight: 97.5 kg (215 lb) 97.5 kg (215 lb) 91.5 kg (201 lb 11.2 oz)     Weight change: -6.033 kg (-13 lb 4.8 oz)    Physical Exam:      General: Alert, cooperative, in no acute distress.  On room air         HEENT:  No oral lesions. No thrush. Oral mucosa moist.,  Speech quality is better   Chest Wall:  No abnormalities observed.             Neck:  Trachea midline. No JVD.   Pulmonary:  CTAB. No wheezes. Respirations regular, even and unlabored.          Cardio:  Regular rhythm and normal rate. Normal S1 and S2. No murmur, gallop, rub or click.    Abdominal:  Soft, non-tender and non-distended. Normal bowel sounds. No masses. No organomegaly. No guarding. No rebound tenderness.  Extremities:  Moves all extremities well. No cyanosis. No redness.  Trace edema.     Discharge Medications and Instructions:     Discharge Medications     Discharge Medications      New Medications      Instructions Start Date   insulin glargine 100 UNIT/ML injection  Commonly known as:  LANTUS   15 Units, Subcutaneous, Every Morning      Insulin Glargine 100 UNIT/ML injection pen  Commonly known as:  LANTUS SOLOSTAR   10 Units, Subcutaneous, Nightly, Hold if blood sugar is less than 180      lacosamide 100 MG tablet tablet  Commonly known as:  VIMPAT   100 mg, Oral, Every 12 Hours Scheduled      melatonin 5 MG tablet tablet   5 mg, Oral, Nightly      risperiDONE 0.5 MG  "tablet  Commonly known as:  risperDAL   0.5 mg, Oral, Every 12 Hours Scheduled      sennosides-docusate 8.6-50 MG per tablet  Commonly known as:  PERICOLACE   1 tablet, Oral, 2 Times Daily PRN      sodium chloride 1 g tablet   2 g, Oral, 2 Times Daily With Meals         Changes to Medications      Instructions Start Date   insulin lispro 100 UNIT/ML injection  Commonly known as:  humaLOG  What changed:    · how much to take  · how to take this  · when to take this  · additional instructions   4 Units, Subcutaneous, 3 Times Daily With Meals      levothyroxine 175 MCG tablet  Commonly known as:  SYNTHROID, LEVOTHROID  What changed:    · medication strength  · how much to take  · how to take this  · when to take this  · additional instructions   175 mcg, Oral, Daily         Continue These Medications      Instructions Start Date   brimonidine 0.15 % ophthalmic solution  Commonly known as:  ALPHAGAN   1 drop, Both Eyes, 2 Times Daily      [facility] Vitamin B-12 2000 MCG tablet  Generic drug:  cyanocobalamin   2,000 mcg, Oral, Daily      freestyle lancets   Use to test blood sugar daily DX:E11.9      Insulin Syringe 31G X 5/16\" 1 ML misc   Use daily for insulin.      latanoprost 0.005 % ophthalmic solution  Commonly known as:  XALATAN   1 drop, Both Eyes, Nightly      simvastatin 20 MG tablet  Commonly known as:  ZOCOR   20 mg, Oral, Nightly      timolol 0.25 % ophthalmic solution  Commonly known as:  TIMOPTIC   1 drop, Both Eyes, 2 Times Daily         Stop These Medications    enalapril 2.5 MG tablet  Commonly known as:  VASOTEC     gabapentin 600 MG tablet  Commonly known as:  NEURONTIN     glucose blood test strip  Commonly known as:  FREESTYLE LITE     insulin NPH-insulin regular (70-30) 100 UNIT/ML injection  Commonly known as:  humuLIN 70/30,novoLIN 70/30     metFORMIN 500 MG tablet  Commonly known as:  GLUCOPHAGE            Discharge Diet:    Dietary Orders (From admission, onward)     Start     Ordered    07/02/20 1200 "  Dietary Nutrition Supplements Other (See Comment); diet pudding  Daily With Breakfast, Lunch & Dinner     Question Answer Comment   Select Supplement: Other (See Comment)    Other diet pudding        07/02/20 0955    07/02/20 1020  Diet Dysphagia; III - Pureed With Some Mashed; Nectar / Syrup Thick; No Straws; Consistent Carbohydrate  Diet Effective Now     Question Answer Comment   Diet Texture / Consistency Dysphagia    Select Dysphagia level: III - Pureed With Some Mashed    Fluid Consistency: Nectar / Syrup Thick    Other Dysphagia Options: No Straws    Common Modifiers Consistent Carbohydrate        07/02/20 1019    06/14/20 1156  Place Feeding Tube Per [employer]odem[employer] System  (Tube Feeding Placement LAWSON)  Until Discontinued     Question:  Tube Feeding Route  Answer:  NG - Nasogastric    06/14/20 1156                Activity at Discharge:   As tolerated    Discharge disposition: Skilled nursing facility    Discharge Instructions and Follow ups:    Follow-up Information     Timo Griffith MD .    Specialty:  Family Medicine  Contact information:  [id number] [place], [place] [id number] [phone]                 No future appointments.     Medication Reconciliation: Please see electronically completed Med Rec.    Total time spent discharging patient including evaluation, medication reconciliation, arranging follow up, and post hospitalization instructions and education total time exceeds 30 minutes.     Rick Lutz MD  07/03/20  10:52      Dictated utilizing Dragon dictation    Electronically signed by Rick Lutz MD at 07/03/20 3844

## 2020-07-07 ENCOUNTER — TELEPHONE (OUTPATIENT)
Dept: FAMILY MEDICINE CLINIC | Facility: CLINIC | Age: 55
End: 2020-07-07

## 2020-07-08 ENCOUNTER — TELEPHONE (OUTPATIENT)
Dept: FAMILY MEDICINE CLINIC | Facility: CLINIC | Age: 55
End: 2020-07-08

## 2020-07-08 LAB — SPECIMEN STATUS: NORMAL

## 2020-07-08 NOTE — TELEPHONE ENCOUNTER
I was able to reach his brother, he told me the patient is doing better, he is on rehab right now, he does know his phone with him, I also reminded him to get a follow-up appointment with me whenever is possible after he has been discharged

## 2020-07-08 NOTE — TELEPHONE ENCOUNTER
[facility] called [facility] at  [phone] .  Patient is being discharged to day and need home health orders for nursing pt and possible ot    Gave verbal orders

## 2020-07-10 LAB
FUNGUS WND CULT: NORMAL
REF LAB TEST RESULTS: NORMAL

## 2020-07-11 DIAGNOSIS — G04.90 ENCEPHALITIS: Primary | ICD-10-CM

## 2020-07-13 ENCOUNTER — TELEPHONE (OUTPATIENT)
Dept: NEUROLOGY | Facility: CLINIC | Age: 55
End: 2020-07-13

## 2020-07-13 NOTE — TELEPHONE ENCOUNTER
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 Last time I checked the report was that he was improving in rehab. Can you reach out to the patient's brother and let him know the diagnosis? Ideally we would have him follow up with Dr. Frank at some point but I would also like him referred to Justin Gordon with [facility].

## 2020-07-15 DIAGNOSIS — R76.8 POSITIVE GLUTAMIC ACID DECARBOXYLASE ANTIBODY: Primary | ICD-10-CM

## 2020-07-24 LAB
MYCOBACTERIUM SPEC CULT: NORMAL
NIGHT BLUE STAIN TISS: NORMAL

## 2020-08-18 ENCOUNTER — TELEPHONE (OUTPATIENT)
Dept: FAMILY MEDICINE CLINIC | Facility: CLINIC | Age: 55
End: 2020-08-18

## 2020-08-18 NOTE — TELEPHONE ENCOUNTER
Dr. Koo,     I received a PA through [employer] for this pt for the medication Enalapril Maleate 2.5 mg tablets. This is not from a pharmacy, it's through the PA site. I don't see this on his meds list though, not even on the discontinued. I was wondering if by some chance you know of this pt taking this medication or maybe recently prescribing it? I can't find anything anywhere.

## 2020-08-19 NOTE — TELEPHONE ENCOUNTER
Pt states he is currently taking the medication but looking at his medication list, it says that Dr. Lutz discontinued the medication and do not return to using the medication after discharge. It very boldly states to stop taking. Please advise on this.......

## 2020-09-14 ENCOUNTER — TELEPHONE (OUTPATIENT)
Dept: FAMILY MEDICINE CLINIC | Facility: CLINIC | Age: 55
End: 2020-09-14

## 2020-09-14 ENCOUNTER — OFFICE VISIT (OUTPATIENT)
Dept: FAMILY MEDICINE CLINIC | Facility: CLINIC | Age: 55
End: 2020-09-14

## 2020-09-14 VITALS
HEART RATE: 67 BPM | SYSTOLIC BLOOD PRESSURE: 104 MMHG | DIASTOLIC BLOOD PRESSURE: 62 MMHG | BODY MASS INDEX: 34.55 KG/M2 | OXYGEN SATURATION: 98 % | TEMPERATURE: 98.6 F | HEIGHT: 66 IN | WEIGHT: 215 LBS

## 2020-09-14 DIAGNOSIS — E10.9 TYPE 1 DIABETES MELLITUS WITHOUT COMPLICATION (HCC): Primary | ICD-10-CM

## 2020-09-14 DIAGNOSIS — D64.9 ANEMIA, UNSPECIFIED TYPE: ICD-10-CM

## 2020-09-14 DIAGNOSIS — E03.9 HYPOTHYROIDISM, UNSPECIFIED TYPE: ICD-10-CM

## 2020-09-14 DIAGNOSIS — I10 HYPERTENSION, UNSPECIFIED TYPE: ICD-10-CM

## 2020-09-14 DIAGNOSIS — E78.00 HIGH CHOLESTEROL: ICD-10-CM

## 2020-09-14 DIAGNOSIS — B35.1 NAIL FUNGUS: ICD-10-CM

## 2020-09-14 DIAGNOSIS — E53.8 LOW VITAMIN B12 LEVEL: ICD-10-CM

## 2020-09-14 PROBLEM — Z79.4 TYPE 2 DIABETES MELLITUS WITHOUT COMPLICATION, WITH LONG-TERM CURRENT USE OF INSULIN (HCC): Status: RESOLVED | Noted: 2019-10-07 | Resolved: 2020-09-14

## 2020-09-14 PROBLEM — E11.9 TYPE 2 DIABETES MELLITUS WITHOUT COMPLICATION, WITH LONG-TERM CURRENT USE OF INSULIN (HCC): Status: RESOLVED | Noted: 2019-10-07 | Resolved: 2020-09-14

## 2020-09-14 PROBLEM — E11.9 TYPE 2 DIABETES MELLITUS WITHOUT COMPLICATION (HCC): Status: RESOLVED | Noted: 2019-10-07 | Resolved: 2020-09-14

## 2020-09-14 PROCEDURE — 90471 IMMUNIZATION ADMIN: CPT | Performed by: FAMILY MEDICINE

## 2020-09-14 PROCEDURE — 99214 OFFICE O/P EST MOD 30 MIN: CPT | Performed by: FAMILY MEDICINE

## 2020-09-14 PROCEDURE — 90686 IIV4 VACC NO PRSV 0.5 ML IM: CPT | Performed by: FAMILY MEDICINE

## 2020-09-14 RX ORDER — INSULIN NPH HUM/REG INSULIN HM 70-30/ML
VIAL (ML) SUBCUTANEOUS
Qty: 30 ML | Refills: 3 | Status: SHIPPED | OUTPATIENT
Start: 2020-09-14

## 2020-09-14 RX ORDER — INSULIN NPH HUM/REG INSULIN HM 70-30/ML
VIAL (ML) SUBCUTANEOUS
COMMUNITY
Start: 2020-08-01 | End: 2020-09-14 | Stop reason: SDUPTHER

## 2020-09-14 RX ORDER — CALCIUM CARB/VITAMIN D3/VIT K1 500-100-40
TABLET,CHEWABLE ORAL
Qty: 100 EACH | Refills: 2 | Status: SHIPPED | OUTPATIENT
Start: 2020-09-14

## 2020-09-14 RX ORDER — LEVOTHYROXINE SODIUM 175 UG/1
175 TABLET ORAL DAILY
Qty: 30 TABLET | Refills: 0 | Status: SHIPPED | OUTPATIENT
Start: 2020-09-14 | End: 2020-09-16 | Stop reason: SDUPTHER

## 2020-09-14 RX ORDER — BLOOD-GLUCOSE METER
1 KIT MISCELLANEOUS DAILY
COMMUNITY
Start: 2020-08-13

## 2020-09-14 RX ORDER — SIMVASTATIN 20 MG
20 TABLET ORAL NIGHTLY
Qty: 90 TABLET | Refills: 3 | Status: SHIPPED | OUTPATIENT
Start: 2020-09-14

## 2020-09-14 NOTE — TELEPHONE ENCOUNTER
Please call [facility] regarding Glucometer and strips, they have a question regarding the instructions. Says it should also be on controlled blank because of Medicaid

## 2020-09-14 NOTE — PROGRESS NOTES
Subjective   Anthony Yang is a 55 y.o. male.     Chief Complaint   Patient presents with   • Diabetes     med refill       Diabetes  He presents for his follow-up diabetic visit. He has type 1 diabetes mellitus. His disease course has been stable. There are no hypoglycemic associated symptoms. Pertinent negatives for diabetes include no blurred vision, no polydipsia and no polyuria. There are no hypoglycemic complications. Symptoms are stable. There are no diabetic complications. Risk factors for coronary artery disease include diabetes mellitus, dyslipidemia, male sex and obesity. Current diabetic treatment includes insulin injections. He is compliant with treatment all of the time. He is following a diabetic diet. When asked about meal planning, he reported none. There is no change in his home blood glucose trend. He does not see a podiatrist.Eye exam is current.      40 U in AM and 38 U in PM on Insulin 70/30, needs refill, blood sugar average less than 120, no feet care, positive eye care, no chest pain or shortness of breath, patient is not fasting, no seizure since last visit, some refills besides this medication, no abdominal pain, no dysuria,    The following portions of the patient's history were reviewed and updated as appropriate: allergies, current medications, past family history, past medical history, past social history, past surgical history and problem list.    Past Medical History:   Diagnosis Date   • Cataract    • Diabetes mellitus (CMS/HCC)     type 1   • Glaucoma    • Neuropathy    • Seizures (CMS/HCC)        Past Surgical History:   Procedure Laterality Date   • EYE SURGERY     • JOINT REPLACEMENT         History reviewed. No pertinent family history.    Social History     Socioeconomic History   • Marital status: Single     Spouse name: Not on file   • Number of children: Not on file   • Years of education: Not on file   • Highest education level: Not on file   Tobacco Use   • Smoking  status: Never Smoker   • Smokeless tobacco: Never Used   Substance and Sexual Activity   • Alcohol use: No     Frequency: Never   • Drug use: No   • Sexual activity: Defer       Review of Systems   Constitutional: Negative.  Negative for unexpected weight gain.   HENT: Negative.    Eyes: Negative for blurred vision.   Respiratory: Negative.    Cardiovascular: Negative.    Gastrointestinal: Negative.  Negative for nausea and vomiting.   Endocrine: Negative.  Negative for polydipsia and polyuria.   Genitourinary: Negative.  Negative for frequency.   Musculoskeletal: Negative.    Skin: Negative.    Allergic/Immunologic: Negative.    Neurological: Negative.  Negative for numbness.   Hematological: Negative.    Psychiatric/Behavioral: Negative.        Objective   Vitals:    09/14/20 1314   BP: 104/62   Pulse: 67   Temp: 98.6 °F (37 °C)   SpO2: 98%     Body mass index is 34.72 kg/m².  Physical Exam  Vitals signs and nursing note reviewed.   Constitutional:       Appearance: He is well-developed.   HENT:      Head: Normocephalic and atraumatic.      Right Ear: External ear normal.      Left Ear: External ear normal.      Nose: Nose normal.   Eyes:      General: No scleral icterus.        Right eye: No discharge.         Left eye: No discharge.      Conjunctiva/sclera: Conjunctivae normal.      Pupils: Pupils are equal, round, and reactive to light.   Neck:      Musculoskeletal: Normal range of motion and neck supple.      Thyroid: No thyromegaly.      Vascular: No JVD.      Trachea: No tracheal deviation.   Cardiovascular:      Rate and Rhythm: Normal rate and regular rhythm.      Heart sounds: Normal heart sounds. No murmur. No friction rub. No gallop.    Pulmonary:      Effort: Pulmonary effort is normal. No respiratory distress.      Breath sounds: Normal breath sounds. No wheezing or rales.   Chest:      Chest wall: No tenderness.   Abdominal:      General: Bowel sounds are normal. There is no distension.       "Palpations: Abdomen is soft. There is no mass.      Tenderness: There is no abdominal tenderness. There is no guarding or rebound.      Hernia: No hernia is present.   Musculoskeletal: Normal range of motion.         General: No tenderness.   Lymphadenopathy:      Cervical: No cervical adenopathy.   Skin:     General: Skin is warm and dry.      Comments: Patient does have very irregular toenails, some of the toenails are missing even assisted, some of those are very thick and yellow   Neurological:      General: No focal deficit present.      Mental Status: He is oriented to person, place, and time. Mental status is at baseline.      Cranial Nerves: No cranial nerve deficit.      Sensory: No sensory deficit.      Motor: No abnormal muscle tone.      Coordination: Coordination normal.      Deep Tendon Reflexes: Reflexes normal.      Comments: Normal monofilament exam on both lower extremities   Psychiatric:         Behavior: Behavior normal.         Thought Content: Thought content normal.         Judgment: Judgment normal.           Assessment/Plan   Anthony was seen today for diabetes.    Diagnoses and all orders for this visit:    Type 1 diabetes mellitus without complication (CMS/[facility])  -     CBC & Differential; Future  -     Comprehensive Metabolic Panel; Future  -     Hemoglobin A1c; Future  -     Lipid Panel; Future  -     TSH; Future  -     POC Urinalysis Dipstick, Automated; Future  -     MicroAlbumin, Urine, Random - Urine, Clean Catch; Future  -     T3, Free; Future  -     T4, Free; Future  -     HumuLIN 70/30 (70-30) 100 UNIT/ML injection; 40 units an AM, and 38 Units in PM  -     Insulin Syringe 31G X 5/16\" 1 ML misc; Use daily for insulin.  -     Ambulatory Referral to Podiatry  -     Fluarix/Fluzone/Afluria Quad>6 Months    Hypothyroidism, unspecified type  -     CBC & Differential; Future  -     Comprehensive Metabolic Panel; Future  -     Hemoglobin A1c; Future  -     Lipid Panel; Future  -     TSH; " Future  -     POC Urinalysis Dipstick, Automated; Future  -     MicroAlbumin, Urine, Random - Urine, Clean Catch; Future  -     T3, Free; Future  -     T4, Free; Future  -     levothyroxine (SYNTHROID, LEVOTHROID) 175 MCG tablet; Take 1 tablet by mouth Daily.    Hypertension, unspecified type  -     CBC & Differential; Future  -     Comprehensive Metabolic Panel; Future  -     Hemoglobin A1c; Future  -     Lipid Panel; Future  -     TSH; Future  -     POC Urinalysis Dipstick, Automated; Future  -     MicroAlbumin, Urine, Random - Urine, Clean Catch; Future  -     T3, Free; Future  -     T4, Free; Future    Low vitamin B12 level  -     Vitamin B12; Future    Anemia, unspecified type  -     Vitamin B12; Future  -     Folate RBC; Future  -     Iron and TIBC; Future  -     Ferritin; Future    High cholesterol  -     simvastatin (ZOCOR) 20 MG tablet; Take 1 tablet by mouth Every Night.    Nail fungus  -     Ambulatory Referral to Podiatry

## 2020-09-14 NOTE — TELEPHONE ENCOUNTER
Called back and spoke with pharmacist.  Needed to be on a different type script pad and also could not read directions.  Per Dr. Koo, NASRIN.

## 2020-09-15 ENCOUNTER — LAB (OUTPATIENT)
Dept: FAMILY MEDICINE CLINIC | Facility: CLINIC | Age: 55
End: 2020-09-15

## 2020-09-15 ENCOUNTER — TELEPHONE (OUTPATIENT)
Dept: FAMILY MEDICINE CLINIC | Facility: CLINIC | Age: 55
End: 2020-09-15

## 2020-09-15 DIAGNOSIS — I10 HYPERTENSION, UNSPECIFIED TYPE: ICD-10-CM

## 2020-09-15 DIAGNOSIS — E53.8 LOW VITAMIN B12 LEVEL: ICD-10-CM

## 2020-09-15 DIAGNOSIS — D64.9 ANEMIA, UNSPECIFIED TYPE: ICD-10-CM

## 2020-09-15 DIAGNOSIS — E03.9 HYPOTHYROIDISM, UNSPECIFIED TYPE: ICD-10-CM

## 2020-09-15 DIAGNOSIS — E10.9 TYPE 1 DIABETES MELLITUS WITHOUT COMPLICATION (HCC): ICD-10-CM

## 2020-09-15 NOTE — TELEPHONE ENCOUNTER
Spoke with patient and he stated that there has not been any changes with insurance.  He is going to call to see why he needs a PA for the medication.  If it is really needed he is going to see if the Humalog would be covered and give us a call back.

## 2020-09-15 NOTE — TELEPHONE ENCOUNTER
Message from pharmacy that Humulin is not covered by insurance.  Is there something different to send?

## 2020-09-16 ENCOUNTER — TELEPHONE (OUTPATIENT)
Dept: FAMILY MEDICINE CLINIC | Facility: CLINIC | Age: 55
End: 2020-09-16

## 2020-09-16 DIAGNOSIS — E03.9 HYPOTHYROIDISM, UNSPECIFIED TYPE: ICD-10-CM

## 2020-09-16 LAB
ALBUMIN SERPL-MCNC: 3.4 G/DL (ref 3.5–5.2)
ALBUMIN/GLOB SERPL: 1.4 G/DL
ALP SERPL-CCNC: 50 U/L (ref 39–117)
ALT SERPL-CCNC: 10 U/L (ref 1–41)
AST SERPL-CCNC: 18 U/L (ref 1–40)
BASOPHILS # BLD AUTO: 0.07 10*3/MM3 (ref 0–0.2)
BASOPHILS NFR BLD AUTO: 1.1 % (ref 0–1.5)
BILIRUB SERPL-MCNC: 0.5 MG/DL (ref 0–1.2)
BUN SERPL-MCNC: 8 MG/DL (ref 6–20)
BUN/CREAT SERPL: 9.8 (ref 7–25)
CALCIUM SERPL-MCNC: 8.5 MG/DL (ref 8.6–10.5)
CHLORIDE SERPL-SCNC: 103 MMOL/L (ref 98–107)
CHOLEST SERPL-MCNC: 138 MG/DL (ref 0–200)
CO2 SERPL-SCNC: 27.4 MMOL/L (ref 22–29)
CREAT SERPL-MCNC: 0.82 MG/DL (ref 0.76–1.27)
EOSINOPHIL # BLD AUTO: 0.48 10*3/MM3 (ref 0–0.4)
EOSINOPHIL NFR BLD AUTO: 7.3 % (ref 0.3–6.2)
ERYTHROCYTE [DISTWIDTH] IN BLOOD BY AUTOMATED COUNT: 12.7 % (ref 12.3–15.4)
FERRITIN SERPL-MCNC: 40.5 NG/ML (ref 30–400)
FOLATE BLD-MCNC: 424 NG/ML
FOLATE RBC-MCNC: 1037 NG/ML
GLOBULIN SER CALC-MCNC: 2.5 GM/DL
GLUCOSE SERPL-MCNC: 245 MG/DL (ref 65–99)
HBA1C MFR BLD: 6.3 % (ref 4.8–5.6)
HCT VFR BLD AUTO: 40.9 % (ref 37.5–51)
HDLC SERPL-MCNC: 42 MG/DL (ref 40–60)
HGB BLD-MCNC: 13 G/DL (ref 13–17.7)
IMM GRANULOCYTES # BLD AUTO: 0.02 10*3/MM3 (ref 0–0.05)
IMM GRANULOCYTES NFR BLD AUTO: 0.3 % (ref 0–0.5)
IRON SATN MFR SERPL: 21 % (ref 20–50)
IRON SERPL-MCNC: 67 MCG/DL (ref 59–158)
LDLC SERPL CALC-MCNC: 78 MG/DL (ref 0–100)
LYMPHOCYTES # BLD AUTO: 1.79 10*3/MM3 (ref 0.7–3.1)
LYMPHOCYTES NFR BLD AUTO: 27 % (ref 19.6–45.3)
MCH RBC QN AUTO: 29.3 PG (ref 26.6–33)
MCHC RBC AUTO-ENTMCNC: 31.8 G/DL (ref 31.5–35.7)
MCV RBC AUTO: 92.1 FL (ref 79–97)
MICROALBUMIN UR-MCNC: 11.5 UG/ML
MONOCYTES # BLD AUTO: 0.94 10*3/MM3 (ref 0.1–0.9)
MONOCYTES NFR BLD AUTO: 14.2 % (ref 5–12)
NEUTROPHILS # BLD AUTO: 3.32 10*3/MM3 (ref 1.7–7)
NEUTROPHILS NFR BLD AUTO: 50.1 % (ref 42.7–76)
NRBC BLD AUTO-RTO: 0 /100 WBC (ref 0–0.2)
PLATELET # BLD AUTO: 262 10*3/MM3 (ref 140–450)
POTASSIUM SERPL-SCNC: 4.9 MMOL/L (ref 3.5–5.2)
PROT SERPL-MCNC: 5.9 G/DL (ref 6–8.5)
RBC # BLD AUTO: 4.44 10*6/MM3 (ref 4.14–5.8)
SODIUM SERPL-SCNC: 138 MMOL/L (ref 136–145)
T3FREE SERPL-MCNC: 2 PG/ML (ref 2–4.4)
T4 FREE SERPL-MCNC: 1.25 NG/DL (ref 0.93–1.7)
TIBC SERPL-MCNC: 313 MCG/DL
TRIGL SERPL-MCNC: 88 MG/DL (ref 0–150)
TSH SERPL DL<=0.005 MIU/L-ACNC: 17.7 UIU/ML (ref 0.27–4.2)
UIBC SERPL-MCNC: 246 MCG/DL (ref 112–346)
VIT B12 SERPL-MCNC: 189 PG/ML (ref 211–946)
VLDLC SERPL CALC-MCNC: 17.6 MG/DL
WBC # BLD AUTO: 6.62 10*3/MM3 (ref 3.4–10.8)

## 2020-09-16 RX ORDER — LEVOTHYROXINE SODIUM 0.2 MG/1
200 TABLET ORAL DAILY
Qty: 90 TABLET | Refills: 3 | Status: SHIPPED | OUTPATIENT
Start: 2020-09-16

## 2020-09-16 NOTE — TELEPHONE ENCOUNTER
----- Message from Timo Gillis MD sent at 9/16/2020  4:30 PM EDT -----  Please call the patient regarding his abnormal result.  I called patient, no answer, left a message to call me back, his hemoglobin is back to normal, his A1c is improved and is almost back to normal, TSH still elevated, I will increase his levothyroxine to 200 mcg, recheck in 6 weeks, vitamin B12 is low, we can start him B12 shots if patient is in agreement, calcium is a little bit low, start calcium over-the-counter, at least 1,ooo  mg a day,

## 2020-09-17 DIAGNOSIS — E53.8 LOW VITAMIN B12 LEVEL: Primary | ICD-10-CM

## 2020-09-17 RX ORDER — CYANOCOBALAMIN 1000 UG/ML
1000 INJECTION, SOLUTION INTRAMUSCULAR; SUBCUTANEOUS
Status: SHIPPED | OUTPATIENT
Start: 2020-09-17

## 2020-09-17 NOTE — TELEPHONE ENCOUNTER
Patient informed and voiced understanding.  He is scheduled for Tuesday to get b12 injection.  Please put in order

## 2020-09-22 ENCOUNTER — CLINICAL SUPPORT (OUTPATIENT)
Dept: FAMILY MEDICINE CLINIC | Facility: CLINIC | Age: 55
End: 2020-09-22

## 2020-09-22 DIAGNOSIS — E53.8 LOW VITAMIN B12 LEVEL: Primary | ICD-10-CM

## 2020-09-22 PROCEDURE — 96372 THER/PROPH/DIAG INJ SC/IM: CPT | Performed by: FAMILY MEDICINE

## 2020-09-22 RX ADMIN — CYANOCOBALAMIN 1000 MCG: 1000 INJECTION, SOLUTION INTRAMUSCULAR; SUBCUTANEOUS at 09:12

## 2020-09-22 NOTE — PROGRESS NOTES
Injection  Injection performed in left deltoid by Kelvin Keith. Patient tolerated the procedure well without complications.  09/22/20   Kelvin Keith

## 2020-10-02 ENCOUNTER — TELEPHONE (OUTPATIENT)
Dept: FAMILY MEDICINE CLINIC | Facility: CLINIC | Age: 55
End: 2020-10-02

## 2020-10-02 NOTE — TELEPHONE ENCOUNTER
[facility] sold patient Novalin 70/30 in place of Humalin that required a PA. Says they are dosed the same

## 2020-10-15 ENCOUNTER — TELEPHONE (OUTPATIENT)
Dept: FAMILY MEDICINE CLINIC | Facility: CLINIC | Age: 55
End: 2020-10-15

## 2020-10-19 ENCOUNTER — TELEPHONE (OUTPATIENT)
Dept: FAMILY MEDICINE CLINIC | Facility: CLINIC | Age: 55
End: 2020-10-19

## 2020-10-19 RX ORDER — BLOOD-GLUCOSE METER
KIT MISCELLANEOUS
COMMUNITY
Start: 2020-09-16

## 2020-10-19 NOTE — TELEPHONE ENCOUNTER
Faxed refill request from [facility] for gabapentin 600 mg Take one tab PO TID    Medication is NOT on current med list    Last ov 9/14/2020    Last maritza 6/6/20 indicates rx refilled 6/6/2020    Please advise,thanks

## 2020-10-22 ENCOUNTER — OFFICE VISIT (OUTPATIENT)
Dept: FAMILY MEDICINE CLINIC | Facility: CLINIC | Age: 55
End: 2020-10-22

## 2020-10-22 VITALS
WEIGHT: 209.4 LBS | SYSTOLIC BLOOD PRESSURE: 120 MMHG | BODY MASS INDEX: 33.65 KG/M2 | TEMPERATURE: 97.8 F | DIASTOLIC BLOOD PRESSURE: 74 MMHG | HEART RATE: 77 BPM | OXYGEN SATURATION: 99 % | HEIGHT: 66 IN

## 2020-10-22 DIAGNOSIS — E10.9 TYPE 1 DIABETES MELLITUS WITHOUT COMPLICATION (HCC): ICD-10-CM

## 2020-10-22 DIAGNOSIS — G62.9 NEUROPATHY: Primary | ICD-10-CM

## 2020-10-22 DIAGNOSIS — Z79.899 HIGH RISK MEDICATION USE: ICD-10-CM

## 2020-10-22 PROCEDURE — 99213 OFFICE O/P EST LOW 20 MIN: CPT | Performed by: FAMILY MEDICINE

## 2020-10-22 RX ORDER — GABAPENTIN 300 MG/1
CAPSULE ORAL
Qty: 90 CAPSULE | Refills: 0 | Status: SHIPPED | OUTPATIENT
Start: 2020-10-22

## 2020-10-22 NOTE — PROGRESS NOTES
Subjective   Anthony Yang is a 55 y.o. male.     Chief Complaint   Patient presents with   • Diabetes       History of Present Illness   Sugar been well controlled according to patient, but he has been out of gabapentin since June she had a small supply by using here and there, complaining neuropathy in both lower extremities, would like to get refills on this medicine, still did not see the neurologist, he also would like to see an ophthalmologist, for diabetic eye checkup, no chest pain or shortness of breath, always patient is doing well,    The following portions of the patient's history were reviewed and updated as appropriate: allergies, current medications, past family history, past medical history, past social history, past surgical history and problem list.    Past Medical History:   Diagnosis Date   • Cataract    • Diabetes mellitus (CMS/[facility])     type 1   • Glaucoma    • Neuropathy    • Seizures (CMS/HCC)        Past Surgical History:   Procedure Laterality Date   • EYE SURGERY     • JOINT REPLACEMENT         No family history on file.    Social History     Socioeconomic History   • Marital status: Single     Spouse name: Not on file   • Number of children: Not on file   • Years of education: Not on file   • Highest education level: Not on file   Tobacco Use   • Smoking status: Never Smoker   • Smokeless tobacco: Never Used   Substance and Sexual Activity   • Alcohol use: No     Frequency: Never   • Drug use: No   • Sexual activity: Defer       Review of Systems   Constitutional: Negative.  Negative for unexpected weight gain.   HENT: Negative.    Eyes: Negative for blurred vision.   Respiratory: Negative.    Cardiovascular: Negative.    Gastrointestinal: Negative.  Negative for nausea and vomiting.   Endocrine: Negative for polydipsia and polyuria.   Genitourinary: Negative.  Negative for frequency.   Musculoskeletal: Negative.    Skin: Negative for skin lesions.   Neurological: Negative for  numbness.        Positive neuropathy in both lower extremities   Hematological: Negative.    All other systems reviewed and are negative.      Objective   Vitals:    10/22/20 1206   BP: 120/74   Pulse: 77   Temp: 97.8 °F (36.6 °C)   SpO2: 99%     Body mass index is 33.81 kg/m².  Physical Exam  Vitals signs and nursing note reviewed.   Constitutional:       Appearance: He is well-developed.   HENT:      Head: Normocephalic and atraumatic.      Right Ear: External ear normal.      Left Ear: External ear normal.      Nose: Nose normal.   Eyes:      General: No scleral icterus.        Right eye: No discharge.         Left eye: No discharge.      Conjunctiva/sclera: Conjunctivae normal.      Pupils: Pupils are equal, round, and reactive to light.   Neck:      Musculoskeletal: Normal range of motion and neck supple.      Thyroid: No thyromegaly.      Vascular: No JVD.      Trachea: No tracheal deviation.   Cardiovascular:      Rate and Rhythm: Normal rate and regular rhythm.      Heart sounds: Normal heart sounds. No murmur. No friction rub. No gallop.    Pulmonary:      Effort: Pulmonary effort is normal. No respiratory distress.      Breath sounds: Normal breath sounds. No wheezing or rales.   Chest:      Chest wall: No tenderness.   Abdominal:      General: Bowel sounds are normal. There is no distension.      Palpations: Abdomen is soft. There is no mass.      Tenderness: There is no abdominal tenderness. There is no guarding or rebound.      Hernia: No hernia is present.   Musculoskeletal: Normal range of motion.         General: No tenderness.   Lymphadenopathy:      Cervical: No cervical adenopathy.   Skin:     General: Skin is warm and dry.   Neurological:      Cranial Nerves: No cranial nerve deficit.      Sensory: No sensory deficit.      Motor: No abnormal muscle tone.      Coordination: Coordination normal.      Deep Tendon Reflexes: Reflexes normal.   Psychiatric:         Behavior: Behavior normal.          Thought Content: Thought content normal.         Judgment: Judgment normal.           Assessment/Plan   Diagnoses and all orders for this visit:    1. High risk medication use (Primary)  -     Compliance Drug Analysis, Ur - Urine, Clean Catch    2. Type 2 diabetes mellitus with diabetic autonomic neuropathy, with long-term current use of insulin (CMS/[facility])    3. Type 1 diabetes mellitus without complication (CMS/[facility])  -     Ambulatory Referral to Ophthalmology  -     gabapentin (NEURONTIN) 300 MG capsule; One tab a day on day 1, then one tab po bid on day 2, then tid  Dispense: 90 capsule; Refill: 0        Side effects discussed with patient in detail, all including but not limited to every single topic discussed

## 2020-10-25 LAB — DRUGS UR: NORMAL

## 2021-10-15 ENCOUNTER — TELEPHONE (OUTPATIENT)
Dept: FAMILY MEDICINE CLINIC | Facility: CLINIC | Age: 56
End: 2021-10-15

## 2021-10-15 NOTE — TELEPHONE ENCOUNTER
Sent pt a letter reminding him to make an office appointment for wellness visit and other important care gap problems.    Reminded pt to fast.